# Patient Record
Sex: MALE | Race: WHITE | NOT HISPANIC OR LATINO | Employment: FULL TIME | URBAN - METROPOLITAN AREA
[De-identification: names, ages, dates, MRNs, and addresses within clinical notes are randomized per-mention and may not be internally consistent; named-entity substitution may affect disease eponyms.]

---

## 2018-04-30 ENCOUNTER — OFFICE VISIT (OUTPATIENT)
Dept: URGENT CARE | Facility: CLINIC | Age: 48
End: 2018-04-30
Payer: COMMERCIAL

## 2018-04-30 VITALS
OXYGEN SATURATION: 97 % | DIASTOLIC BLOOD PRESSURE: 87 MMHG | HEART RATE: 68 BPM | TEMPERATURE: 97.9 F | SYSTOLIC BLOOD PRESSURE: 146 MMHG

## 2018-04-30 DIAGNOSIS — H00.014 HORDEOLUM EXTERNUM OF LEFT UPPER EYELID: Primary | ICD-10-CM

## 2018-04-30 PROCEDURE — 99203 OFFICE O/P NEW LOW 30 MIN: CPT | Performed by: NURSE PRACTITIONER

## 2018-04-30 RX ORDER — ERYTHROMYCIN 5 MG/G
0.5 OINTMENT OPHTHALMIC
Qty: 3.5 G | Refills: 0 | Status: SHIPPED | OUTPATIENT
Start: 2018-04-30 | End: 2022-04-01

## 2018-04-30 NOTE — PROGRESS NOTES
[address]        NAME: Sebastian Krishna is a 50 y o  male  : 1970    MRN: [id number]  DATE: 2018  TIME: 5:42 PM    Assessment and Plan   Hordeolum externum of left upper eyelid [H00 014]  1  Hordeolum externum of left upper eyelid  erythromycin [facility]) ophthalmic ointment         Patient Instructions     Patient Instructions   Continue warm compresses  Erythromycin ointment as directed  Call or return for problems/concerns    Follow up with PCP in 3-5 days  Proceed to  ER if symptoms worsen  Chief Complaint     Chief Complaint   Patient presents with    Eyelid Pain     "Stye" on VARINDER x 5 days, not "coming to a head" with hot compresses         History of Present Illness       Stye on the upper left eyelid for 5 days  Has been doing warm compresses for 3 days  Review of Systems   Review of Systems   Constitutional: Negative for activity change, diaphoresis, fatigue and fever  HENT: Negative for congestion, facial swelling, hearing loss, rhinorrhea, sinus pain, sinus pressure, sneezing, sore throat and voice change  Eyes: Negative for discharge and visual disturbance  Respiratory: Negative for cough, choking, chest tightness, shortness of breath, wheezing and stridor  Cardiovascular: Negative for chest pain, palpitations and leg swelling  Gastrointestinal: Negative for abdominal distention, abdominal pain, constipation, diarrhea, nausea and vomiting  Endocrine: Negative for polydipsia, polyphagia and polyuria  Genitourinary: Negative for difficulty urinating, dysuria, frequency and urgency  Musculoskeletal: Negative for arthralgias, back pain, gait problem, joint swelling, myalgias, neck pain and neck stiffness  Skin: Negative for color change, rash and wound  Neurological: Negative for dizziness, syncope, speech difficulty, weakness, light-headedness and headaches  Hematological: Negative for adenopathy  Does not bruise/bleed easily  Psychiatric/Behavioral: Negative for agitation, behavioral problems, confusion, hallucinations, sleep disturbance and suicidal ideas  The patient is not nervous/anxious  Current Medications       Current Outpatient Prescriptions:     erythromycin [facility]) ophthalmic ointment, Administer 0 5 inches into the left eye every 4 (four) hours, Disp: 3 5 g, Rfl: 0    Current Allergies     Allergies as of 04/30/2018    (No Known Allergies)            The following portions of the patient's history were reviewed and updated as appropriate: allergies, current medications, past family history, past medical history, past social history, past surgical history and problem list      No past medical history on file  Past Surgical History:   Procedure Laterality Date    COLOSTOMY         No family history on file  Medications have been verified  Objective   /87   Pulse 68   Temp 97 9 °F (36 6 °C) (Tympanic)   SpO2 97%        Physical Exam     Physical Exam   Constitutional: He is oriented to person, place, and time  He appears well-developed and well-nourished  No distress  Eyes:       Cardiovascular: Normal rate, regular rhythm and normal heart sounds  No murmur heard  Pulmonary/Chest: Effort normal and breath sounds normal  No respiratory distress  He has no wheezes  Musculoskeletal: Normal range of motion  Neurological: He is alert and oriented to person, place, and time  Skin: Skin is warm and dry  He is not diaphoretic  Psychiatric: He has a normal mood and affect  His behavior is normal  Judgment and thought content normal    Nursing note and vitals reviewed

## 2020-08-16 ENCOUNTER — OFFICE VISIT (OUTPATIENT)
Dept: URGENT CARE | Facility: CLINIC | Age: 50
End: 2020-08-16
Payer: COMMERCIAL

## 2020-08-16 VITALS
RESPIRATION RATE: 18 BRPM | TEMPERATURE: 98.7 F | HEIGHT: 70 IN | SYSTOLIC BLOOD PRESSURE: 155 MMHG | DIASTOLIC BLOOD PRESSURE: 74 MMHG | WEIGHT: 315 LBS | BODY MASS INDEX: 45.1 KG/M2 | OXYGEN SATURATION: 97 % | HEART RATE: 64 BPM

## 2020-08-16 DIAGNOSIS — L03.115 CELLULITIS OF RIGHT LOWER EXTREMITY: Primary | ICD-10-CM

## 2020-08-16 PROCEDURE — 99213 OFFICE O/P EST LOW 20 MIN: CPT | Performed by: EMERGENCY MEDICINE

## 2020-08-16 RX ORDER — CEPHALEXIN 500 MG/1
500 CAPSULE ORAL EVERY 6 HOURS SCHEDULED
Qty: 28 CAPSULE | Refills: 0 | Status: SHIPPED | OUTPATIENT
Start: 2020-08-16 | End: 2020-08-23

## 2020-08-16 NOTE — PROGRESS NOTES
[address]        NAME: Fab Del Valle is a 44 y o  male  : 1970    MRN: [id number]  DATE: 2020  TIME: 10:35 AM    Assessment and Plan   Cellulitis of right lower extremity [L03 115]  1  Cellulitis of right lower extremity  cephalexin (KEFLEX) 500 mg capsule         Patient Instructions     Patient Instructions     Cellulitis  1  Begin your antibiotic today  2   Elevate leg to limit swelling  3  Tylenol as needed for pain  4  If worsening symptoms or onset of chills, fever, go directly to ER for evaluation  WHAT YOU NEED TO KNOW:   Cellulitis is a skin infection caused by bacteria  Cellulitis may go away on its own or you may need treatment  Your healthcare provider may draw a [place] around the outside edges of your cellulitis  If your cellulitis spreads, your healthcare provider will see it outside of the [place]  DISCHARGE INSTRUCTIONS:   Call 911 if:   · You have sudden trouble breathing or chest pain  Return to the emergency department if:   · Your wound gets larger and more painful  · You feel a crackling under your skin when you touch it  · You have purple dots or bumps on your skin, or you see bleeding under your skin  · You have new swelling and pain in your legs  · The red, warm, swollen area gets larger  · You see red streaks coming from the infected area  Contact your healthcare provider if:   · You have a fever  · Your fever or pain does not go away or gets worse  · The area does not get smaller after 2 days of antibiotics  · Your skin is flaking or peeling off  · You have questions or concerns about your condition or care  Medicines:   · Antibiotics  help treat the bacterial infection  · NSAIDs , such as ibuprofen, help decrease swelling, pain, and fever  NSAIDs can cause stomach bleeding or kidney problems in certain people  If you take blood thinner medicine, always ask if NSAIDs are safe for you   Always read the medicine label and follow directions  Do not give these medicines to children under 10months of age without direction from your child's healthcare provider  · Acetaminophen  decreases pain and fever  It is available without a doctor's order  Ask how much to take and how often to take it  Follow directions  Read the labels of all other medicines you are using to see if they also contain acetaminophen, or ask your doctor or pharmacist  Acetaminophen can cause liver damage if not taken correctly  Do not use more than 4 grams (4,000 milligrams) total of acetaminophen in one day  · Take your medicine as directed  Contact your healthcare provider if you think your medicine is not helping or if you have side effects  Tell him or her if you are allergic to any medicine  Keep a list of the medicines, vitamins, and herbs you take  Include the amounts, and when and why you take them  Bring the list or the pill bottles to follow-up visits  Carry your medicine list with you in case of an emergency  Self-care:   · Elevate the area above the level of your heart  as often as you can  This will help decrease swelling and pain  Prop the area on pillows or blankets to keep it elevated comfortably  · Clean the area daily until the wound scabs over  Gently wash the area with soap and water  Pat dry  Use dressings as directed  · Place cool or warm, wet cloths on the area as directed  Use clean cloths and clean water  Leave it on the area until the cloth is room temperature  Pat the area dry with a clean, dry cloth  The cloths may help decrease pain  Prevent cellulitis:   · Do not scratch bug bites or areas of injury  You increase your risk for cellulitis by scratching these areas  · Do not share personal items, such as towels, clothing, and razors  · Clean exercise equipment  with germ-killing  before and after you use it  · Wash your hands often  Use soap and water   Wash your hands after you use the bathroom, change a child's diapers, or sneeze  Wash your hands before you prepare or eat food  Use lotion to prevent dry, cracked skin  · Wear pressure stockings as directed  You may be told to wear the stockings if you have peripheral edema  The stockings improve blood flow and decrease swelling  · Treat athlete's foot  This can help prevent the spread of a bacterial skin infection  Follow up with your healthcare provider within 3 days, or as directed: Your healthcare provider will check if your cellulitis is getting better  You may need different medicine  Write down your questions so you remember to ask them during your visits  © 2017 [facility] Information is for End User's use only and may not be sold, redistributed or otherwise used for commercial purposes  All illustrations and images included in CareNotes® are the copyrighted property of [employer]  or [employer]  The above information is an  only  It is not intended as medical advice for individual conditions or treatments  Talk to your doctor, nurse or pharmacist before following any medical regimen to see if it is safe and effective for you  Follow up with PCP in 3-5 days  Proceed to  ER if symptoms worsen  Chief Complaint     Chief Complaint   Patient presents with    Cellulitis     Patient presents with red and warm area to RLE that started yesterday  History of Present Illness       This is a 57-year-old male with a history of cellulitis who presents with redness and swelling of his right lower extremity  He states that approximately every 2 years he does get this and when he sees his family doctor it is responsive to Keflex  He has had no injury to the leg  Patient also denies any fever or chills  Review of Systems   Review of Systems   Constitutional: Negative for chills and fever  HENT: Negative for congestion      Cardiovascular: Positive for leg swelling  Pain redness and swelling of the right lower extremity  Gastrointestinal: Negative  Musculoskeletal: Negative for arthralgias, joint swelling and myalgias  Current Medications       Current Outpatient Medications:     cephalexin (KEFLEX) 500 mg capsule, Take 1 capsule (500 mg total) by mouth every 6 (six) hours for 7 days, Disp: 28 capsule, Rfl: 0    erythromycin (ROMYCIN) ophthalmic ointment, Administer 0 5 inches into the left eye every 4 (four) hours (Patient not taking: Reported on 8/16/2020), Disp: 3 5 g, Rfl: 0    Current Allergies     Allergies as of 08/16/2020    (No Known Allergies)            The following portions of the patient's history were reviewed and updated as appropriate: allergies, current medications, past family history, past medical history, past social history, past surgical history and problem list      Past Medical History:   Diagnosis Date    Ulcerative colitis ([facility] [address] )        Past Surgical History:   Procedure Laterality Date    COLOSTOMY      COLOSTOMY         No family history on file  Medications have been verified  Objective   /74   Pulse 64   Temp 98 7 °F (37 1 °C) (Temporal)   Resp 18   Ht 5' 10" (1 778 m)   Wt (!) 149 kg (329 lb)   SpO2 97%   BMI 47 21 kg/m²        Physical Exam     Physical Exam  Vitals signs and nursing note reviewed  Constitutional:       Appearance: Normal appearance  He is well-developed  Comments: Overweight for height  HENT:      Head: Normocephalic and atraumatic  Eyes:      Extraocular Movements: Extraocular movements intact  Conjunctiva/sclera: Conjunctivae normal       Pupils: Pupils are equal, round, and reactive to light  Neck:      Musculoskeletal: Normal range of motion and neck supple  Cardiovascular:      Rate and Rhythm: Normal rate and regular rhythm  Heart sounds: No murmur  No friction rub     Pulmonary:      Effort: Pulmonary effort is normal       Breath sounds: Normal breath sounds  No wheezing, rhonchi or rales  Abdominal:      General: There is no distension  Palpations: Abdomen is soft  There is no mass  Tenderness: There is no abdominal tenderness  Comments: Colostomy present  Musculoskeletal: Normal range of motion  Comments: There is erythema of the anterior lower right tib-fib area with a line of demarcation at the ankle  There are no open sores  There is no drainage  Calf is nontender to palpation  Skin:     General: Skin is warm and dry  Neurological:      Mental Status: He is alert and oriented to person, place, and time     Psychiatric:         Mood and Affect: Mood normal          Behavior: Behavior normal

## 2020-08-16 NOTE — PATIENT INSTRUCTIONS
Cellulitis  1  Begin your antibiotic today  2   Elevate leg to limit swelling  3  Tylenol as needed for pain  4  If worsening symptoms or onset of chills, fever, go directly to ER for evaluation  WHAT YOU NEED TO KNOW:   Cellulitis is a skin infection caused by bacteria  Cellulitis may go away on its own or you may need treatment  Your healthcare provider may draw a [place] around the outside edges of your cellulitis  If your cellulitis spreads, your healthcare provider will see it outside of the [place]  DISCHARGE INSTRUCTIONS:   Call 911 if:   · You have sudden trouble breathing or chest pain  Return to the emergency department if:   · Your wound gets larger and more painful  · You feel a crackling under your skin when you touch it  · You have purple dots or bumps on your skin, or you see bleeding under your skin  · You have new swelling and pain in your legs  · The red, warm, swollen area gets larger  · You see red streaks coming from the infected area  Contact your healthcare provider if:   · You have a fever  · Your fever or pain does not go away or gets worse  · The area does not get smaller after 2 days of antibiotics  · Your skin is flaking or peeling off  · You have questions or concerns about your condition or care  Medicines:   · Antibiotics  help treat the bacterial infection  · NSAIDs , such as ibuprofen, help decrease swelling, pain, and fever  NSAIDs can cause stomach bleeding or kidney problems in certain people  If you take blood thinner medicine, always ask if NSAIDs are safe for you  Always read the medicine label and follow directions  Do not give these medicines to children under 10months of age without direction from your child's healthcare provider  · Acetaminophen  decreases pain and fever  It is available without a doctor's order  Ask how much to take and how often to take it  Follow directions   Read the labels of all other medicines you are using to see if they also contain acetaminophen, or ask your doctor or pharmacist  Acetaminophen can cause liver damage if not taken correctly  Do not use more than 4 grams (4,000 milligrams) total of acetaminophen in one day  · Take your medicine as directed  Contact your healthcare provider if you think your medicine is not helping or if you have side effects  Tell him or her if you are allergic to any medicine  Keep a list of the medicines, vitamins, and herbs you take  Include the amounts, and when and why you take them  Bring the list or the pill bottles to follow-up visits  Carry your medicine list with you in case of an emergency  Self-care:   · Elevate the area above the level of your heart  as often as you can  This will help decrease swelling and pain  Prop the area on pillows or blankets to keep it elevated comfortably  · Clean the area daily until the wound scabs over  Gently wash the area with soap and water  Pat dry  Use dressings as directed  · Place cool or warm, wet cloths on the area as directed  Use clean cloths and clean water  Leave it on the area until the cloth is room temperature  Pat the area dry with a clean, dry cloth  The cloths may help decrease pain  Prevent cellulitis:   · Do not scratch bug bites or areas of injury  You increase your risk for cellulitis by scratching these areas  · Do not share personal items, such as towels, clothing, and razors  · Clean exercise equipment  with germ-killing  before and after you use it  · Wash your hands often  Use soap and water  Wash your hands after you use the bathroom, change a child's diapers, or sneeze  Wash your hands before you prepare or eat food  Use lotion to prevent dry, cracked skin  · Wear pressure stockings as directed  You may be told to wear the stockings if you have peripheral edema  The stockings improve blood flow and decrease swelling  · Treat athlete's foot    This can help prevent the spread of a bacterial skin infection  Follow up with your healthcare provider within 3 days, or as directed: Your healthcare provider will check if your cellulitis is getting better  You may need different medicine  Write down your questions so you remember to ask them during your visits  © 2017 [address] [employer] Information is for End User's use only and may not be sold, redistributed or otherwise used for commercial purposes  All illustrations and images included in CareNotes® are the copyrighted property of [employer]  or [employer]  The above information is an  only  It is not intended as medical advice for individual conditions or treatments  Talk to your doctor, nurse or pharmacist before following any medical regimen to see if it is safe and effective for you

## 2020-12-14 ENCOUNTER — OFFICE VISIT (OUTPATIENT)
Dept: URGENT CARE | Facility: CLINIC | Age: 50
End: 2020-12-14
Payer: COMMERCIAL

## 2020-12-14 ENCOUNTER — HOSPITAL ENCOUNTER (EMERGENCY)
Facility: HOSPITAL | Age: 50
Discharge: HOME | End: 2020-12-14
Attending: EMERGENCY MEDICINE | Admitting: EMERGENCY MEDICINE
Payer: COMMERCIAL

## 2020-12-14 ENCOUNTER — TELEPHONE (OUTPATIENT)
Dept: URGENT CARE | Facility: CLINIC | Age: 50
End: 2020-12-14

## 2020-12-14 ENCOUNTER — APPOINTMENT (EMERGENCY)
Dept: CT IMAGING | Facility: HOSPITAL | Age: 50
End: 2020-12-14
Payer: COMMERCIAL

## 2020-12-14 VITALS
TEMPERATURE: 99.5 F | RESPIRATION RATE: 20 BRPM | OXYGEN SATURATION: 96 % | WEIGHT: 310 LBS | HEIGHT: 70 IN | BODY MASS INDEX: 44.38 KG/M2 | DIASTOLIC BLOOD PRESSURE: 95 MMHG | HEART RATE: 77 BPM | SYSTOLIC BLOOD PRESSURE: 178 MMHG

## 2020-12-14 VITALS
TEMPERATURE: 97.4 F | HEART RATE: 60 BPM | BODY MASS INDEX: 44.38 KG/M2 | DIASTOLIC BLOOD PRESSURE: 96 MMHG | WEIGHT: 310 LBS | HEIGHT: 70 IN | RESPIRATION RATE: 20 BRPM | OXYGEN SATURATION: 98 % | SYSTOLIC BLOOD PRESSURE: 160 MMHG

## 2020-12-14 DIAGNOSIS — N20.0 KIDNEY STONE: Primary | ICD-10-CM

## 2020-12-14 DIAGNOSIS — R10.9 FLANK PAIN: Primary | ICD-10-CM

## 2020-12-14 DIAGNOSIS — R35.0 URINARY FREQUENCY: ICD-10-CM

## 2020-12-14 LAB
BACTERIA UR QL AUTO: ABNORMAL /HPF
BILIRUB UR QL STRIP: NEGATIVE
CLARITY UR: ABNORMAL
COLOR UR: YELLOW
GLUCOSE UR STRIP-MCNC: NEGATIVE MG/DL
HGB UR QL STRIP.AUTO: ABNORMAL
KETONES UR STRIP-MCNC: NEGATIVE MG/DL
LEUKOCYTE ESTERASE UR QL STRIP: NEGATIVE
NITRITE UR QL STRIP: NEGATIVE
NON-SQ EPI CELLS URNS QL MICRO: ABNORMAL /HPF
PH UR STRIP.AUTO: 6 [PH]
PROT UR STRIP-MCNC: NEGATIVE MG/DL
RBC #/AREA URNS AUTO: ABNORMAL /HPF
SL AMB  POCT GLUCOSE, UA: NEGATIVE
SL AMB LEUKOCYTE ESTERASE,UA: NEGATIVE
SL AMB POCT BILIRUBIN,UA: NEGATIVE
SL AMB POCT BLOOD,UA: ABNORMAL
SL AMB POCT CLARITY,UA: CLEAR
SL AMB POCT COLOR,UA: ABNORMAL
SL AMB POCT KETONES,UA: NEGATIVE
SL AMB POCT NITRITE,UA: NEGATIVE
SL AMB POCT PH,UA: 5
SL AMB POCT SPECIFIC GRAVITY,UA: 1
SL AMB POCT URINE PROTEIN: ABNORMAL
SL AMB POCT UROBILINOGEN: 0.2
SP GR UR STRIP.AUTO: <=1.005 (ref 1–1.03)
UROBILINOGEN UR QL STRIP.AUTO: 0.2 E.U./DL
WBC #/AREA URNS AUTO: ABNORMAL /HPF

## 2020-12-14 PROCEDURE — 81003 URINALYSIS AUTO W/O SCOPE: CPT | Performed by: EMERGENCY MEDICINE

## 2020-12-14 PROCEDURE — 74176 CT ABD & PELVIS W/O CONTRAST: CPT

## 2020-12-14 PROCEDURE — 99213 OFFICE O/P EST LOW 20 MIN: CPT | Performed by: NURSE PRACTITIONER

## 2020-12-14 PROCEDURE — 81002 URINALYSIS NONAUTO W/O SCOPE: CPT | Performed by: NURSE PRACTITIONER

## 2020-12-14 PROCEDURE — 99284 EMERGENCY DEPT VISIT MOD MDM: CPT

## 2020-12-14 PROCEDURE — 81001 URINALYSIS AUTO W/SCOPE: CPT | Performed by: EMERGENCY MEDICINE

## 2020-12-14 PROCEDURE — 99284 EMERGENCY DEPT VISIT MOD MDM: CPT | Performed by: PHYSICIAN ASSISTANT

## 2020-12-14 PROCEDURE — G1004 CDSM NDSC: HCPCS

## 2022-02-22 ENCOUNTER — HOSPITAL ENCOUNTER (OUTPATIENT)
Dept: ULTRASOUND IMAGING | Facility: HOSPITAL | Age: 52
Discharge: HOME | End: 2022-02-22
Payer: COMMERCIAL

## 2022-02-22 DIAGNOSIS — N20.0 CALCULUS OF KIDNEY: ICD-10-CM

## 2022-02-22 DIAGNOSIS — R31.29 OTHER MICROSCOPIC HEMATURIA: ICD-10-CM

## 2022-02-22 DIAGNOSIS — N18.9 CHRONIC KIDNEY DISEASE, UNSPECIFIED: ICD-10-CM

## 2022-02-22 PROCEDURE — 76770 US EXAM ABDO BACK WALL COMP: CPT

## 2022-03-27 ENCOUNTER — APPOINTMENT (EMERGENCY)
Dept: CT IMAGING | Facility: HOSPITAL | Age: 52
DRG: 871 | End: 2022-03-27
Payer: COMMERCIAL

## 2022-03-27 ENCOUNTER — HOSPITAL ENCOUNTER (INPATIENT)
Facility: HOSPITAL | Age: 52
LOS: 1 days | Discharge: HOME | DRG: 871 | End: 2022-03-29
Attending: INTERNAL MEDICINE | Admitting: INTERNAL MEDICINE
Payer: COMMERCIAL

## 2022-03-27 DIAGNOSIS — K52.9 GASTROENTERITIS: ICD-10-CM

## 2022-03-27 DIAGNOSIS — R11.2 NAUSEA & VOMITING: Primary | ICD-10-CM

## 2022-03-27 DIAGNOSIS — N17.0 ACUTE KIDNEY INJURY (AKI) WITH ACUTE TUBULAR NECROSIS (ATN) (HCC): ICD-10-CM

## 2022-03-27 PROBLEM — N17.9 SEPSIS WITH ACUTE RENAL FAILURE WITHOUT SEPTIC SHOCK (HCC): Status: ACTIVE | Noted: 2022-03-27

## 2022-03-27 PROBLEM — E66.01 MORBID OBESITY WITH BMI OF 40.0-44.9, ADULT (HCC): Status: ACTIVE | Noted: 2022-03-27

## 2022-03-27 PROBLEM — R65.20 SEPSIS WITH ACUTE RENAL FAILURE WITHOUT SEPTIC SHOCK (HCC): Status: ACTIVE | Noted: 2022-03-27

## 2022-03-27 PROBLEM — A41.9 SEPSIS WITH ACUTE RENAL FAILURE WITHOUT SEPTIC SHOCK (HCC): Status: ACTIVE | Noted: 2022-03-27

## 2022-03-27 PROBLEM — B99.9 INTRA-ABDOMINAL INFECTION: Status: ACTIVE | Noted: 2022-03-27

## 2022-03-27 LAB
ALBUMIN SERPL BCP-MCNC: 5.3 G/DL (ref 3.5–5)
ALP SERPL-CCNC: 100 U/L (ref 34–104)
ALT SERPL W P-5'-P-CCNC: 22 U/L (ref 7–52)
ANION GAP SERPL CALCULATED.3IONS-SCNC: 14 MMOL/L (ref 4–13)
APTT PPP: 31 SECONDS (ref 23–37)
AST SERPL W P-5'-P-CCNC: 22 U/L (ref 13–39)
BACTERIA UR QL AUTO: ABNORMAL /HPF
BASOPHILS # BLD AUTO: 0.07 THOUSANDS/ΜL (ref 0–0.1)
BASOPHILS NFR BLD AUTO: 0 % (ref 0–1)
BILIRUB SERPL-MCNC: 1.11 MG/DL (ref 0.2–1)
BILIRUB UR QL STRIP: NEGATIVE
BUN SERPL-MCNC: 27 MG/DL (ref 5–25)
CALCIUM SERPL-MCNC: 10.8 MG/DL (ref 8.4–10.2)
CHLORIDE SERPL-SCNC: 99 MMOL/L (ref 96–108)
CLARITY UR: CLEAR
CO2 SERPL-SCNC: 21 MMOL/L (ref 21–32)
COLOR UR: YELLOW
CREAT SERPL-MCNC: 2.14 MG/DL (ref 0.6–1.3)
EOSINOPHIL # BLD AUTO: 0.04 THOUSAND/ΜL (ref 0–0.61)
EOSINOPHIL NFR BLD AUTO: 0 % (ref 0–6)
ERYTHROCYTE [DISTWIDTH] IN BLOOD BY AUTOMATED COUNT: 13.3 % (ref 11.6–15.1)
FINE GRAN CASTS URNS QL MICRO: ABNORMAL /LPF
FLUAV RNA RESP QL NAA+PROBE: NEGATIVE
FLUBV RNA RESP QL NAA+PROBE: NEGATIVE
GFR SERPL CREATININE-BSD FRML MDRD: 34 ML/MIN/1.73SQ M
GLUCOSE SERPL-MCNC: 164 MG/DL (ref 65–140)
GLUCOSE UR STRIP-MCNC: NEGATIVE MG/DL
HCT VFR BLD AUTO: 56.7 % (ref 36.5–49.3)
HGB BLD-MCNC: 18.4 G/DL (ref 12–17)
HGB UR QL STRIP.AUTO: ABNORMAL
HYALINE CASTS #/AREA URNS LPF: ABNORMAL /LPF
IMM GRANULOCYTES # BLD AUTO: 0.1 THOUSAND/UL (ref 0–0.2)
IMM GRANULOCYTES NFR BLD AUTO: 1 % (ref 0–2)
INR PPP: 1.08 (ref 0.84–1.19)
KETONES UR STRIP-MCNC: NEGATIVE MG/DL
LACTATE SERPL-SCNC: 2 MMOL/L (ref 0.5–2)
LEUKOCYTE ESTERASE UR QL STRIP: NEGATIVE
LIPASE SERPL-CCNC: 29 U/L (ref 11–82)
LYMPHOCYTES # BLD AUTO: 0.6 THOUSANDS/ΜL (ref 0.6–4.47)
LYMPHOCYTES NFR BLD AUTO: 3 % (ref 14–44)
MCH RBC QN AUTO: 29.6 PG (ref 26.8–34.3)
MCHC RBC AUTO-ENTMCNC: 32.5 G/DL (ref 31.4–37.4)
MCV RBC AUTO: 91 FL (ref 82–98)
MONOCYTES # BLD AUTO: 1.3 THOUSAND/ΜL (ref 0.17–1.22)
MONOCYTES NFR BLD AUTO: 6 % (ref 4–12)
NEUTROPHILS # BLD AUTO: 18.92 THOUSANDS/ΜL (ref 1.85–7.62)
NEUTS SEG NFR BLD AUTO: 90 % (ref 43–75)
NITRITE UR QL STRIP: NEGATIVE
NON-SQ EPI CELLS URNS QL MICRO: ABNORMAL /HPF
NRBC BLD AUTO-RTO: 0 /100 WBCS
PH UR STRIP.AUTO: 5.5 [PH]
PLATELET # BLD AUTO: 381 THOUSANDS/UL (ref 149–390)
PMV BLD AUTO: 9.9 FL (ref 8.9–12.7)
POTASSIUM SERPL-SCNC: 4.6 MMOL/L (ref 3.5–5.3)
PROCALCITONIN SERPL-MCNC: 0.67 NG/ML
PROT SERPL-MCNC: 9.3 G/DL (ref 6.4–8.4)
PROT UR STRIP-MCNC: ABNORMAL MG/DL
PROTHROMBIN TIME: 13.9 SECONDS (ref 11.6–14.5)
RBC # BLD AUTO: 6.22 MILLION/UL (ref 3.88–5.62)
RBC #/AREA URNS AUTO: ABNORMAL /HPF
RSV RNA RESP QL NAA+PROBE: NEGATIVE
SARS-COV-2 RNA RESP QL NAA+PROBE: NEGATIVE
SODIUM SERPL-SCNC: 134 MMOL/L (ref 135–147)
SP GR UR STRIP.AUTO: >=1.03 (ref 1–1.03)
UROBILINOGEN UR QL STRIP.AUTO: 0.2 E.U./DL
WBC # BLD AUTO: 21.03 THOUSAND/UL (ref 4.31–10.16)
WBC #/AREA URNS AUTO: ABNORMAL /HPF

## 2022-03-27 PROCEDURE — 74176 CT ABD & PELVIS W/O CONTRAST: CPT

## 2022-03-27 PROCEDURE — 80053 COMPREHEN METABOLIC PANEL: CPT | Performed by: INTERNAL MEDICINE

## 2022-03-27 PROCEDURE — 83605 ASSAY OF LACTIC ACID: CPT | Performed by: INTERNAL MEDICINE

## 2022-03-27 PROCEDURE — 87209 SMEAR COMPLEX STAIN: CPT | Performed by: HOSPITALIST

## 2022-03-27 PROCEDURE — 81003 URINALYSIS AUTO W/O SCOPE: CPT | Performed by: HOSPITALIST

## 2022-03-27 PROCEDURE — 84145 PROCALCITONIN (PCT): CPT

## 2022-03-27 PROCEDURE — 83993 ASSAY FOR CALPROTECTIN FECAL: CPT | Performed by: HOSPITALIST

## 2022-03-27 PROCEDURE — 96361 HYDRATE IV INFUSION ADD-ON: CPT

## 2022-03-27 PROCEDURE — 99285 EMERGENCY DEPT VISIT HI MDM: CPT | Performed by: INTERNAL MEDICINE

## 2022-03-27 PROCEDURE — 96374 THER/PROPH/DIAG INJ IV PUSH: CPT

## 2022-03-27 PROCEDURE — 83690 ASSAY OF LIPASE: CPT | Performed by: INTERNAL MEDICINE

## 2022-03-27 PROCEDURE — 0241U HB NFCT DS VIR RESP RNA 4 TRGT: CPT | Performed by: INTERNAL MEDICINE

## 2022-03-27 PROCEDURE — 87505 NFCT AGENT DETECTION GI: CPT | Performed by: HOSPITALIST

## 2022-03-27 PROCEDURE — 85610 PROTHROMBIN TIME: CPT | Performed by: INTERNAL MEDICINE

## 2022-03-27 PROCEDURE — G1004 CDSM NDSC: HCPCS

## 2022-03-27 PROCEDURE — 85730 THROMBOPLASTIN TIME PARTIAL: CPT | Performed by: INTERNAL MEDICINE

## 2022-03-27 PROCEDURE — 87493 C DIFF AMPLIFIED PROBE: CPT | Performed by: HOSPITALIST

## 2022-03-27 PROCEDURE — 81001 URINALYSIS AUTO W/SCOPE: CPT | Performed by: HOSPITALIST

## 2022-03-27 PROCEDURE — 36415 COLL VENOUS BLD VENIPUNCTURE: CPT | Performed by: INTERNAL MEDICINE

## 2022-03-27 PROCEDURE — NC001 PR NO CHARGE: Performed by: INTERNAL MEDICINE

## 2022-03-27 PROCEDURE — 99284 EMERGENCY DEPT VISIT MOD MDM: CPT

## 2022-03-27 PROCEDURE — 85025 COMPLETE CBC W/AUTO DIFF WBC: CPT | Performed by: INTERNAL MEDICINE

## 2022-03-27 PROCEDURE — 99220 PR INITIAL OBSERVATION CARE/DAY 70 MINUTES: CPT | Performed by: HOSPITALIST

## 2022-03-27 PROCEDURE — 87177 OVA AND PARASITES SMEARS: CPT | Performed by: HOSPITALIST

## 2022-03-27 PROCEDURE — 87040 BLOOD CULTURE FOR BACTERIA: CPT | Performed by: INTERNAL MEDICINE

## 2022-03-27 RX ORDER — ACETAMINOPHEN 325 MG/1
650 TABLET ORAL EVERY 6 HOURS PRN
Status: DISCONTINUED | OUTPATIENT
Start: 2022-03-27 | End: 2022-03-29 | Stop reason: HOSPADM

## 2022-03-27 RX ORDER — ONDANSETRON 2 MG/ML
4 INJECTION INTRAMUSCULAR; INTRAVENOUS EVERY 6 HOURS PRN
Status: DISCONTINUED | OUTPATIENT
Start: 2022-03-27 | End: 2022-03-29 | Stop reason: HOSPADM

## 2022-03-27 RX ORDER — ONDANSETRON 2 MG/ML
4 INJECTION INTRAMUSCULAR; INTRAVENOUS ONCE
Status: COMPLETED | OUTPATIENT
Start: 2022-03-27 | End: 2022-03-27

## 2022-03-27 RX ORDER — SODIUM CHLORIDE 9 MG/ML
75 INJECTION, SOLUTION INTRAVENOUS CONTINUOUS
Status: DISCONTINUED | OUTPATIENT
Start: 2022-03-27 | End: 2022-03-29

## 2022-03-27 RX ADMIN — SODIUM CHLORIDE 125 ML/HR: 0.9 INJECTION, SOLUTION INTRAVENOUS at 13:08

## 2022-03-27 RX ADMIN — SODIUM CHLORIDE 500 ML: 0.9 INJECTION, SOLUTION INTRAVENOUS at 07:39

## 2022-03-27 RX ADMIN — ONDANSETRON 4 MG: 2 INJECTION INTRAMUSCULAR; INTRAVENOUS at 04:45

## 2022-03-27 RX ADMIN — PIPERACILLIN AND TAZOBACTAM 3.38 G: 3; .375 INJECTION, POWDER, FOR SOLUTION INTRAVENOUS at 20:00

## 2022-03-27 RX ADMIN — ACETAMINOPHEN 650 MG: 325 TABLET ORAL at 13:32

## 2022-03-27 RX ADMIN — SODIUM CHLORIDE 2000 ML: 0.9 INJECTION, SOLUTION INTRAVENOUS at 04:45

## 2022-03-27 RX ADMIN — SODIUM CHLORIDE 3.38 G: 900 INJECTION, SOLUTION INTRAVENOUS at 07:39

## 2022-03-27 RX ADMIN — PIPERACILLIN AND TAZOBACTAM 3.38 G: 3; .375 INJECTION, POWDER, FOR SOLUTION INTRAVENOUS at 14:42

## 2022-03-27 RX ADMIN — ENOXAPARIN SODIUM 40 MG: 100 INJECTION SUBCUTANEOUS at 10:00

## 2022-03-27 NOTE — ED PROVIDER NOTES
History  Chief Complaint   Patient presents with    Vomiting     every 2-3 hours since yesterday, also has colostomy that has contained diarrhea often     49-year-old male for the last 24 hours not been able to eat or drink anything  His a colostomy due to elective colectomy related to ulcerative colitis  So abdominal pain but it is not severe  Mostly just from retching from his vomiting  He started with vomiting and now has diarrhea as well  Denies headache or sore throat  No recent travel  No one else sick in the house  Prior to Admission Medications   Prescriptions Last Dose Informant Patient Reported? Taking?   erythromycin [facility]) ophthalmic ointment   No No   Sig: Administer 0 5 inches into the left eye every 4 (four) hours   Patient not taking: Reported on 8/16/2020      Facility-Administered Medications: None       Past Medical History:   Diagnosis Date    [facility]        Past Surgical History:   Procedure Laterality Date    COLOSTOMY      COLOSTOMY         History reviewed  No pertinent family history  I have reviewed and agree with the history as documented  E-Cigarette/Vaping    E-Cigarette Use Never User      E-Cigarette/Vaping Substances     Social History     Tobacco Use    Smoking status: Never Smoker    Smokeless tobacco: Never Used   Vaping Use    Vaping Use: Never used   Substance Use Topics    Alcohol use: Not Currently    Drug use: Never       Review of Systems   Constitutional: Positive for fever  Negative for chills  HENT: Negative for rhinorrhea and sore throat  Eyes: Negative for visual disturbance  Respiratory: Negative for cough and shortness of breath  Cardiovascular: Negative for chest pain and leg swelling  Gastrointestinal: Positive for diarrhea, nausea and vomiting  Negative for abdominal pain  Genitourinary: Negative for dysuria  Musculoskeletal: Negative for back pain and myalgias  Skin: Negative for rash  Neurological: Negative for dizziness and headaches  Psychiatric/Behavioral: Negative for confusion  All other systems reviewed and are negative  Physical Exam  Physical Exam  Vitals and nursing note reviewed  Constitutional:       Appearance: He is well-developed  He is obese  He is ill-appearing  HENT:      Nose: Nose normal       Mouth/Throat:      Pharynx: No oropharyngeal exudate  Eyes:      General: No scleral icterus  Conjunctiva/sclera: Conjunctivae normal       Pupils: Pupils are equal, round, and reactive to light  Neck:      Vascular: No JVD  Trachea: No tracheal deviation  Cardiovascular:      Rate and Rhythm: Normal rate and regular rhythm  Heart sounds: Normal heart sounds  No murmur heard  Pulmonary:      Effort: Pulmonary effort is normal  No respiratory distress  Breath sounds: Normal breath sounds  No wheezing or rales  Abdominal:      General: Bowel sounds are normal       Palpations: Abdomen is soft  Tenderness: There is no abdominal tenderness  There is no guarding  Musculoskeletal:         General: No tenderness  Normal range of motion  Cervical back: Normal range of motion and neck supple  Skin:     General: Skin is warm and dry  Neurological:      Mental Status: He is alert and oriented to person, place, and time  Cranial Nerves: No cranial nerve deficit  Sensory: No sensory deficit  Motor: No abnormal muscle tone        Comments: 5/5 motor, nl sens   Psychiatric:         Behavior: Behavior normal          Vital Signs  ED Triage Vitals [03/27/22 0428]   Temperature Pulse Respirations Blood Pressure SpO2   97 8 °F (36 6 °C) (!) 109 20 142/96 95 %      Temp Source Heart Rate Source Patient Position - Orthostatic VS BP Location FiO2 (%)   Temporal Monitor Sitting Left arm --      Pain Score       --           Vitals:    03/27/22 0428 03/27/22 0537   BP: 142/96    Pulse: (!) 109 100   Patient Position - Orthostatic VS: Sitting          Visual Acuity      ED Medications  Medications   sodium chloride 0 9 % bolus 500 mL (has no administration in time range)   piperacillin-tazobactam (ZOSYN) IVPB 3 375 g (has no administration in time range)   sodium chloride 0 9 % bolus 2,000 mL (2,000 mL Intravenous New Bag 3/27/22 0445)   ondansetron (ZOFRAN) injection 4 mg (4 mg Intravenous Given 3/27/22 0445)       Diagnostic Studies  Results Reviewed     Procedure Component Value Units Date/Time    Procalcitonin [[id number]]  (Abnormal) Collected: 03/27/22 0448    Lab Status: Final result Specimen: Blood from Arm, Right Updated: 03/27/22 0726     Procalcitonin 0 67 ng/ml     Lactic acid [[id number]]  (Normal) Collected: 03/27/22 0553    Lab Status: Final result Specimen: Blood from Arm, Right Updated: 03/27/22 0645     LACTIC ACID 2 0 mmol/L     Narrative:      Result may be elevated if tourniquet was used during collection  COVID/FLU/RSV - 2 hour TAT [[id number]]  (Normal) Collected: 03/27/22 0554    Lab Status: Final result Specimen: Nares from Nasopharyngeal Swab Updated: 03/27/22 0640     SARS-CoV-2 Negative     INFLUENZA A PCR Negative     INFLUENZA B PCR Negative     RSV PCR Negative    Narrative:      FOR PEDIATRIC PATIENTS - copy/paste COVID Guidelines URL to browser: [email or web address]    SARS-CoV-2 assay is a Nucleic Acid Amplification assay intended for the  qualitative detection of nucleic acid from SARS-CoV-2 in nasopharyngeal  swabs  Results are for the presumptive identification of SARS-CoV-2 RNA  Positive results are indicative of infection with SARS-CoV-2, the virus  causing COVID-19, but do not rule out bacterial infection or co-infection  with other viruses  Laboratories within the [place] and its  territories are required to report all positive results to the appropriate  public health authorities   Negative results do not preclude SARS-CoV-2  infection and should not be used as the sole basis for treatment or other  patient management decisions  Negative results must be combined with  clinical observations, patient history, and epidemiological information  This test has not been FDA cleared or approved  This test has been authorized by FDA under an Emergency Use Authorization  (EUA)  This test is only authorized for the duration of time the  declaration that circumstances exist justifying the authorization of the  emergency use of an in vitro diagnostic tests for detection of SARS-CoV-2  virus and/or diagnosis of COVID-19 infection under section 564(b)(1) of  the Act, 21 U  S C  760IWW-6(B)(1), unless the authorization is terminated  or revoked sooner  The test has been validated but independent review by FDA  and CLIA is pending  Test performed using [employer] Gene[employer]pert: This RT-PCR assay targets N2,  a region unique to SARS-CoV-2  A conserved region in the E-gene was chosen  for pan-Sarbecovirus detection which includes SARS-CoV-2  Blood culture #1 [[id number]] Collected: 03/27/22 0552    Lab Status: In process Specimen: Blood from Arm, Left Updated: 03/27/22 0559    Blood culture #2 [[id number]] Collected: 03/27/22 0552    Lab Status:  In process Specimen: Blood from Arm, Right Updated: 03/27/22 0559    CBC and differential [[id number]]  (Abnormal) Collected: 03/27/22 0448    Lab Status: Final result Specimen: Blood from Arm, Right Updated: 03/27/22 0536     WBC 21 03 Thousand/uL      RBC 6 22 Million/uL      Hemoglobin 18 4 g/dL      Hematocrit 56 7 %      MCV 91 fL      MCH 29 6 pg      MCHC 32 5 g/dL      RDW 13 3 %      MPV 9 9 fL      Platelets 389 Thousands/uL      nRBC 0 /100 WBCs      Neutrophils Relative 90 %      Immat GRANS % 1 %      Lymphocytes Relative 3 %      Monocytes Relative 6 %      Eosinophils Relative 0 %      Basophils Relative 0 %      Neutrophils Absolute 18 92 Thousands/µL      Immature Grans Absolute 0 10 Thousand/uL      Lymphocytes Absolute 0 60 Thousands/µL      Monocytes Absolute 1 30 Thousand/µL      Eosinophils Absolute 0 04 Thousand/µL      Basophils Absolute 0 07 Thousands/µL     Comprehensive metabolic panel [[id number]]  (Abnormal) Collected: 03/27/22 0448    Lab Status: Final result Specimen: Blood from Arm, Right Updated: 03/27/22 0525     Sodium 134 mmol/L      Potassium 4 6 mmol/L      Chloride 99 mmol/L      CO2 21 mmol/L      ANION GAP 14 mmol/L      BUN 27 mg/dL      Creatinine 2 14 mg/dL      Glucose 164 mg/dL      Calcium 10 8 mg/dL      AST 22 U/L      ALT 22 U/L      Alkaline Phosphatase 100 U/L      Total Protein 9 3 g/dL      Albumin 5 3 g/dL      Total Bilirubin 1 11 mg/dL      eGFR 34 ml/min/1 73sq m     Narrative:      [place] guidelines for Chronic Kidney Disease (CKD):     Stage 1 with normal or high GFR (GFR > 90 mL/min/1 73 square meters)    Stage 2 Mild CKD (GFR = 60-89 mL/min/1 73 square meters)    Stage 3A Moderate CKD (GFR = 45-59 mL/min/1 73 square meters)    Stage 3B Moderate CKD (GFR = 30-44 mL/min/1 73 square meters)    Stage 4 Severe CKD (GFR = 15-29 mL/min/1 73 square meters)    Stage 5 End Stage CKD (GFR <15 mL/min/1 73 square meters)  Note: GFR calculation is accurate only with a steady state creatinine    Lipase [[id number]]  (Normal) Collected: 03/27/22 0448    Lab Status: Final result Specimen: Blood from Arm, Right Updated: 03/27/22 0525     Lipase 29 u/L     Protime-INR [[id number]]  (Normal) Collected: 03/27/22 0448    Lab Status: Final result Specimen: Blood from Arm, Right Updated: 03/27/22 0519     Protime 13 9 seconds      INR 1 08    APTT [[id number]]  (Normal) Collected: 03/27/22 0448    Lab Status: Final result Specimen: Blood from Arm, Right Updated: 03/27/22 0519     PTT 31 seconds                  CT abdomen pelvis wo contrast   Final Result by Quincy Velázquez DO (03/27 2598)      Status post colectomy with right lower quadrant ileostomy  Moderately distended stomach  Multiple dilated loops of small bowel are noted  There is relative underdistention of the small bowel loops in the right lower quadrant and in the ostomy  Differential considerations include ileus versus small bowel    obstruction, correlation with the patient's symptoms recommended  Cholelithiasis without discrete evidence of acute cholecystitis, left renal atrophy and left-sided nephrolithiasis; no hydronephrosis, and other findings as above  Workstation performed: [id number]                    Procedures  Procedures         ED Course  ED Course as of 03/27/22 0727   Santhosh Alvarado Mar 27, 2022   0650 Additional 500 cc are being given to cover sepsis fluids  , CT scan is still pending    0707 Case discussed with Dr Sallie Weiss   0720 Creatinine(!): 2 14   [id number] Discussed with Medicine, will admit for IV hydration  Of starting antibiotics at their request                                             MDM    Disposition  Final diagnoses:   Nausea & vomiting   Gastroenteritis   Acute kidney injury (JENAE) with acute tubular necrosis (ATN) ([facility] )     Time reflects when diagnosis was documented in both MDM as applicable and the Disposition within this note     Time User Action Codes Description Comment    3/27/2022  7:22 AM Ky Favia Add [R11 2] Nausea & vomiting     3/27/2022  7:22 AM Ky Favia Add [K52 9] Gastroenteritis     3/27/2022  7:23 AM Ky Favia Add [N17 0] Acute kidney injury (JENAE) with acute tubular necrosis (ATN) [facility])       ED Disposition     ED Disposition Condition Date/Time Comment    Admit Stable Sun Mar 27, 2022  7:23 AM Case was discussed with Dr Meli Delgado and the patient's admission status was agreed to be Admission Status: observation status to the service of Dr Lindsey Dobbs    None         Patient's Medications   Discharge Prescriptions    No medications on file       No discharge procedures on file      PDMP Review     None ED Provider  Electronically Signed by           Yvonne Sheets DO  03/27/22 5995

## 2022-03-27 NOTE — ASSESSMENT & PLAN NOTE
· Admit to med surge observation  · Patient met sepsis criteria at time of arrival with a white blood cell count of 21 03, a pulse of 109  · Most likely secondary to an intra-abdominal infection-gastritis for his gastroenteritis  · Procalcitonin testing is elevated at 0 67  · Will make the patient NPO, give IV fluids, start treatment with IV Zosyn, follow-up on the blood cultures  · Will consult Gastroenterology  · Acute kidney injury:- present on admission  · Arrival creatinine 2 14 - suspect that this is pre renal secondary to severe dehydration, patient also noted to have a hemoglobin of 18 4 with a hematocrit of 56 7  · Acute tubular necrosis secondary to sepsis and infection is also amidst the differential  · Start normal saline at 125 cc an hour, if no improvement in renal function over the next 24 hours, the patient will need a nephrology evaluation  At that point we will embark off on a full-blown renal workup if necessary  · Hyponatremia:- arrival sodium is 134, most likely a hypovolemic hyponatremia, will re-evaluate after treatment with normal saline  · Repeat blood work in the a m

## 2022-03-27 NOTE — ASSESSMENT & PLAN NOTE
· CT abdomen with the following results:-Status post colectomy with right lower quadrant ileostomy  Moderately distended stomach   Multiple dilated loops of small bowel are noted   There is relative underdistention of the small bowel loops in the right lower quadrant and in the ostomy   Differential considerations include ileus versus small bowel obstruction, correlation with the patient's symptoms recommended  Cholelithiasis without discrete evidence of acute cholecystitis, left renal atrophy and left-sided nephrolithiasis; no hydronephrosis, and other findings as above     · Patient has a history of ulcerative colitis in the past  · Will formally consult GI

## 2022-03-28 PROBLEM — N17.9 ACUTE KIDNEY INJURY (NONTRAUMATIC) (HCC): Status: ACTIVE | Noted: 2022-03-28

## 2022-03-28 PROBLEM — E87.1 DEHYDRATION WITH HYPONATREMIA: Status: ACTIVE | Noted: 2022-03-28

## 2022-03-28 PROBLEM — E86.0 DEHYDRATION WITH HYPONATREMIA: Status: ACTIVE | Noted: 2022-03-28

## 2022-03-28 LAB
ALBUMIN SERPL BCP-MCNC: 4.5 G/DL (ref 3.5–5)
ALP SERPL-CCNC: 81 U/L (ref 34–104)
ALT SERPL W P-5'-P-CCNC: 28 U/L (ref 7–52)
ANION GAP SERPL CALCULATED.3IONS-SCNC: 11 MMOL/L (ref 4–13)
AST SERPL W P-5'-P-CCNC: 26 U/L (ref 13–39)
BASOPHILS # BLD AUTO: 0.05 THOUSANDS/ΜL (ref 0–0.1)
BASOPHILS NFR BLD AUTO: 1 % (ref 0–1)
BILIRUB SERPL-MCNC: 1.14 MG/DL (ref 0.2–1)
BUN SERPL-MCNC: 30 MG/DL (ref 5–25)
C DIFF TOX GENS STL QL NAA+PROBE: NEGATIVE
CALCIUM SERPL-MCNC: 9.8 MG/DL (ref 8.4–10.2)
CAMPYLOBACTER DNA SPEC NAA+PROBE: NORMAL
CHLORIDE SERPL-SCNC: 102 MMOL/L (ref 96–108)
CO2 SERPL-SCNC: 22 MMOL/L (ref 21–32)
CREAT SERPL-MCNC: 1.89 MG/DL (ref 0.6–1.3)
EOSINOPHIL # BLD AUTO: 0.24 THOUSAND/ΜL (ref 0–0.61)
EOSINOPHIL NFR BLD AUTO: 4 % (ref 0–6)
ERYTHROCYTE [DISTWIDTH] IN BLOOD BY AUTOMATED COUNT: 13.6 % (ref 11.6–15.1)
GFR SERPL CREATININE-BSD FRML MDRD: 39 ML/MIN/1.73SQ M
GLUCOSE P FAST SERPL-MCNC: 125 MG/DL (ref 65–99)
GLUCOSE SERPL-MCNC: 125 MG/DL (ref 65–140)
HCT VFR BLD AUTO: 51.9 % (ref 36.5–49.3)
HGB BLD-MCNC: 16.7 G/DL (ref 12–17)
IMM GRANULOCYTES # BLD AUTO: 0 THOUSAND/UL (ref 0–0.2)
IMM GRANULOCYTES NFR BLD AUTO: 0 % (ref 0–2)
LYMPHOCYTES # BLD AUTO: 0.89 THOUSANDS/ΜL (ref 0.6–4.47)
LYMPHOCYTES NFR BLD AUTO: 13 % (ref 14–44)
MAGNESIUM SERPL-MCNC: 2.3 MG/DL (ref 1.9–2.7)
MCH RBC QN AUTO: 29.7 PG (ref 26.8–34.3)
MCHC RBC AUTO-ENTMCNC: 32.2 G/DL (ref 31.4–37.4)
MCV RBC AUTO: 92 FL (ref 82–98)
MONOCYTES # BLD AUTO: 1.36 THOUSAND/ΜL (ref 0.17–1.22)
MONOCYTES NFR BLD AUTO: 20 % (ref 4–12)
NEUTROPHILS # BLD AUTO: 4.12 THOUSANDS/ΜL (ref 1.85–7.62)
NEUTS SEG NFR BLD AUTO: 62 % (ref 43–75)
NRBC BLD AUTO-RTO: 0 /100 WBCS
PHOSPHATE SERPL-MCNC: 4.1 MG/DL (ref 2.7–4.5)
PLATELET # BLD AUTO: 317 THOUSANDS/UL (ref 149–390)
PMV BLD AUTO: 9.7 FL (ref 8.9–12.7)
POTASSIUM SERPL-SCNC: 4.2 MMOL/L (ref 3.5–5.3)
PROT SERPL-MCNC: 8.4 G/DL (ref 6.4–8.4)
RBC # BLD AUTO: 5.63 MILLION/UL (ref 3.88–5.62)
SALMONELLA DNA SPEC QL NAA+PROBE: NORMAL
SHIGA TOXIN STX GENE SPEC NAA+PROBE: NORMAL
SHIGELLA DNA SPEC QL NAA+PROBE: NORMAL
SODIUM SERPL-SCNC: 135 MMOL/L (ref 135–147)
WBC # BLD AUTO: 6.66 THOUSAND/UL (ref 4.31–10.16)

## 2022-03-28 PROCEDURE — 83735 ASSAY OF MAGNESIUM: CPT | Performed by: HOSPITALIST

## 2022-03-28 PROCEDURE — 85025 COMPLETE CBC W/AUTO DIFF WBC: CPT | Performed by: HOSPITALIST

## 2022-03-28 PROCEDURE — 36415 COLL VENOUS BLD VENIPUNCTURE: CPT | Performed by: HOSPITALIST

## 2022-03-28 PROCEDURE — 80053 COMPREHEN METABOLIC PANEL: CPT | Performed by: HOSPITALIST

## 2022-03-28 PROCEDURE — 99232 SBSQ HOSP IP/OBS MODERATE 35: CPT | Performed by: NURSE PRACTITIONER

## 2022-03-28 PROCEDURE — 84100 ASSAY OF PHOSPHORUS: CPT | Performed by: HOSPITALIST

## 2022-03-28 PROCEDURE — 99223 1ST HOSP IP/OBS HIGH 75: CPT | Performed by: INTERNAL MEDICINE

## 2022-03-28 RX ADMIN — PIPERACILLIN AND TAZOBACTAM 3.38 G: 3; .375 INJECTION, POWDER, FOR SOLUTION INTRAVENOUS at 08:36

## 2022-03-28 RX ADMIN — ENOXAPARIN SODIUM 40 MG: 100 INJECTION SUBCUTANEOUS at 08:36

## 2022-03-28 RX ADMIN — PIPERACILLIN AND TAZOBACTAM 3.38 G: 3; .375 INJECTION, POWDER, FOR SOLUTION INTRAVENOUS at 02:08

## 2022-03-28 RX ADMIN — SODIUM CHLORIDE 75 ML/HR: 0.9 INJECTION, SOLUTION INTRAVENOUS at 23:05

## 2022-03-28 RX ADMIN — PIPERACILLIN AND TAZOBACTAM 3.38 G: 3; .375 INJECTION, POWDER, FOR SOLUTION INTRAVENOUS at 13:56

## 2022-03-28 NOTE — PROGRESS NOTES
I was informed about this patient this afternoon and have reviewed his CT, labs, and admission note  I have not examined the patient  He has history of UC and is s/p total colectomy with ileostomy  He presents with vomiting, increased ostomy output, dehydration, and JENAE after eating seafood last night  No evidence of obstruction on CT; he seems to be hemo-concentrated; lactate normal     Rac:  - Stool studies for C  diff, o&p , enteric panel  - IV hydration  - NPO  - Trend labs  - Formal GI consult to follow

## 2022-03-28 NOTE — ASSESSMENT & PLAN NOTE
· Present on admission as evidence by creatinine 2 14  · Suspect multifactorial pre renal/ATN secondary to severe dehydration and infection, patient also noted to have a hemoglobin of 18 4 with a hematocrit of 56 7  · Baseline appears to be approximately 1 4-1 5  · Further trend as noted above; improved with IVF   · Responded to IVF; continue for now  · Avoid nephrotoxins, NSAIDS, and relative hypotension if possible   · Renally dose medications as appropriate   · Monitor with morning labs while IP     Lab Results   Component Value Date    CREATININE 1 89 (H) 03/28/2022    CREATININE 2 14 (H) 03/27/2022

## 2022-03-28 NOTE — PROGRESS NOTES
[address]  Progress Note Jc León [birth year], 46 y o  male MRN: [id number]  Unit/Bed#: ED 24 Encounter: [id number]  Primary Care Provider: Kathy Guerrero DO   Date and time admitted to hospital: 3/27/2022  4:26 AM    * Sepsis with acute renal failure without septic shock [facility])  Assessment & Plan  Background: Presented to the ED after persistent diarrhea and intractable N/V    · Met sepsis criteria on admission with WBC 21 03 and  most likely secondary to an intra-abdominal infection-gastritis for his gastroenteritis  · Procalcitonin testing is elevated at 0 67  · Continue NPO for now; possibly to advance on 3/28 however will defer to GI team   · Continue IV fluids unless diet advanced   · Continue IV Zosyn  · Blood cultures pending   · Stool enteric, calprotectin, C diff, and ova/parasite pending   · Gastroenterology consulted; awaiting formal evaluation   · WBC improved rapidly     Intra-abdominal infection  Assessment & Plan  · CT abdomen with the following results:Status post colectomy with right lower quadrant ileostomy  Moderately distended stomach   Multiple dilated loops of small bowel are noted   There is relative underdistention of the small bowel loops in the right lower quadrant and in the ostomy   Differential considerations include ileus versus small bowel obstruction, correlation with the patient's symptoms recommended  Cholelithiasis without discrete evidence of acute cholecystitis, left renal atrophy and left-sided nephrolithiasis; no hydronephrosis, and other findings as above     · With history of UC in the past  · GI consulted; input as noted above         Morbid obesity with BMI of 40 0-44 9, adult ([facility] )  Assessment & Plan  · Actual BMI 43 05  · Dietary, weight loss, and lifestyle modification counseling was provided    Dehydration with hyponatremia  Assessment & Plan  · Present on admission as evidence by Na of 134  · In the setting of dehydration given N/V/D and ostomy   · Improved with IVF  · No need for Nephrology evaluation at this time   · Monitor with morning labs     Lab Results   Component Value Date    SODIUM 135 03/28/2022    SODIUM 134 (L) 03/27/2022         Acute kidney injury (nontraumatic) (HCC)  Assessment & Plan  · Present on admission as evidence by creatinine 2 14  · Suspect multifactorial pre renal/ATN secondary to severe dehydration and infection, patient also noted to have a hemoglobin of 18 4 with a hematocrit of 56 7  · Baseline appears to be approximately 1 4-1 5  · Further trend as noted above; improved with IVF   · Responded to IVF; continue for now  · Avoid nephrotoxins, NSAIDS, and relative hypotension if possible   · Renally dose medications as appropriate   · Monitor with morning labs while IP     Lab Results   Component Value Date    CREATININE 1 89 (H) 03/28/2022    CREATININE 2 14 (H) 03/27/2022               VTE Pharmacologic Prophylaxis: VTE Score: 5 High Risk (Score >/= 5) - Pharmacological DVT Prophylaxis Ordered: enoxaparin (Lovenox)  Sequential Compression Devices Ordered  Patient Centered Rounds: I performed bedside rounds with nursing staff today  Discussions with Specialists or Other Care Team Provider: nursing staff, GI, and CM     Education and Discussions with Family / Patient: Patient declined call to   however I did offer     Time Spent for Care: 30 minutes  More than 50% of total time spent on counseling and coordination of care as described above  Current Length of Stay: 0 day(s)  Current Patient Status: Inpatient   Certification Statement: The patient will continue to require additional inpatient hospital stay due to IVF and IV abx   Discharge Plan: possibly tomorrow home pending toleration of PO diet as well as continued improvement of kidney function     Code Status: Level 1 - Full Code    Subjective:   Patient reports significant improvement in N/V   Reports no abdominal pain and toleration of Zosyn     Objective:     Vitals:   Temp (24hrs), Av 5 °F (37 5 °C), Min:98 2 °F (36 8 °C), Max:100 5 °F (38 1 °C)    Temp:  [98 2 °F (36 8 °C)-100 5 °F (38 1 °C)] 98 6 °F (37 °C)  HR:  [82-92] 82  Resp:  [18-20] 20  BP: (132-137)/(80-84) 136/82  SpO2:  [94 %-96 %] 94 %  Body mass index is 43 05 kg/m²  Input and Output Summary (last 24 hours): Intake/Output Summary (Last 24 hours) at 3/28/2022 0955  Last data filed at 3/28/2022 0601  Gross per 24 hour   Intake 760 ml   Output --   Net 760 ml       Physical Exam:   Physical Exam  Constitutional:       Appearance: He is obese  He is not ill-appearing  Cardiovascular:      Rate and Rhythm: Normal rate and regular rhythm  Pulses: Normal pulses  Heart sounds: Normal heart sounds  Pulmonary:      Effort: Pulmonary effort is normal       Breath sounds: Normal breath sounds  Abdominal:      General: Bowel sounds are normal       Palpations: Abdomen is soft  Skin:     General: Skin is warm  Capillary Refill: Capillary refill takes less than 2 seconds  Neurological:      Mental Status: He is alert and oriented to person, place, and time  Psychiatric:         Behavior: Behavior normal  Behavior is cooperative            Additional Data:     Labs:  Results from last 7 days   Lab Units 22  0453   WBC Thousand/uL 6 66   HEMOGLOBIN g/dL 16 7   HEMATOCRIT % 51 9*   PLATELETS Thousands/uL 317   NEUTROS PCT % 62   LYMPHS PCT % 13*   MONOS PCT % 20*   EOS PCT % 4     Results from last 7 days   Lab Units 22  0453   SODIUM mmol/L 135   POTASSIUM mmol/L 4 2   CHLORIDE mmol/L 102   CO2 mmol/L 22   BUN mg/dL 30*   CREATININE mg/dL 1 89*   ANION GAP mmol/L 11   CALCIUM mg/dL 9 8   ALBUMIN g/dL 4 5   TOTAL BILIRUBIN mg/dL 1 14*   ALK PHOS U/L 81   ALT U/L 28   AST U/L 26   GLUCOSE RANDOM mg/dL 125     Results from last 7 days   Lab Units 22  0448   INR  1 08             Results from last 7 days   Lab Units 03/27/22  0553 03/27/22  0448   LACTIC ACID mmol/L 2 0  --    PROCALCITONIN ng/ml  --  0 67*       Lines/Drains:  Invasive Devices  Report    Peripheral Intravenous Line            Peripheral IV 03/27/22 Right Antecubital 1 day                      Imaging: Reviewed radiology reports from this admission including: abdominal/pelvic CT    Recent Cultures (last 7 days):   Results from last 7 days   Lab Units 03/27/22  0552   BLOOD CULTURE  Received in Microbiology Lab  Culture in Progress  Received in Microbiology Lab  Culture in Progress  Last 24 Hours Medication List:   Current Facility-Administered Medications   Medication Dose Route Frequency Provider Last Rate    acetaminophen  650 mg Oral Q6H PRN Matthew Lisa MD      enoxaparin  40 mg Subcutaneous Daily Matthew Lisa MD      ondansetron  4 mg Intravenous Q6H PRN Matthew Lisa MD      piperacillin-tazobactam  3 375 g Intravenous [facility], MD 0 g (03/27/22 2030)    sodium chloride  75 mL/hr Intravenous Continuous ANITA Wilder 125 mL/hr (03/27/22 2056)        Today, Patient Was Seen By: ANITA Sun    **Please Note: This note may have been constructed using a voice recognition system  **

## 2022-03-28 NOTE — ASSESSMENT & PLAN NOTE
Background: Presented to the ED after persistent diarrhea and intractable N/V    · Met sepsis criteria on admission with WBC 21 03 and  most likely secondary to an intra-abdominal infection-gastritis for his gastroenteritis  · Procalcitonin testing is elevated at 0 67  · Continue NPO for now; possibly to advance on 3/28 however will defer to GI team   · Continue IV fluids unless diet advanced   · Continue IV Zosyn  · Blood cultures pending   · Stool enteric, calprotectin, C diff, and ova/parasite pending   · Gastroenterology consulted; awaiting formal evaluation   · WBC improved rapidly

## 2022-03-28 NOTE — CONSULTS
Consultation - [facility]) [facility]  Bibiana Lynch 46 y o  male MRN: [id number]  Unit/Bed#: ED 24 Encounter: [id number]        Inpatient consult to gastroenterology  Consult performed by: Joyce Holden PA-C  Consult ordered by: Fidel Jacob MD          Reason for Consult / Principal Problem:     Gastroenteritis      ASSESSMENT AND PLAN:      Patient is a 46 y o  male with PMH significant for UC s/p colectomy with RLQ ileostomy and rectal stump approx 15 years prior who presented to the ED on 3/27 for multiple episodes of bilious vomiting and new high-output ileostomy  CT A/P notable for s/p colectomy with right lower quadrant ileostomy; moderately distended stomach; multiple dilated loops of small bowel are noted; there is relative underdistention of the small bowel loops in the right lower quadrant and in the ostomy; differential considerations include ileus versus small bowel obstruction, correlation with the patient's symptoms recommended; cholelithiasis cholecystitis, left renal atrophy and left-sided nephrolithiasis; no hydronephrosis, and other findings as above  Labs notable for hemoconcentration, repeat CBC with resolved leukocytosis (21- now 6 66) on Zosyn and normal hgb, improving JENAE (Cr 2 14- now 1 89), elevated T  Bili (1 14), elevated procalcitonin (0 67)  Stool studies including fecal calprotectin, stool enteric bacterial panel, O&P, and C  Diff are collected and in process  Blood cultures x2 collected and pending  Patient with significant clinical improvement  Admits to some mild abdominal tenderness, but endorses good appetite, denies further nausea/vomiting, and admits to smaller volume output from ileostomy  Clinical picture most consistent with infectious gastroenteritis, much less likely autoimmune colitis with small bowel involvement - Especially 15 years s/p colectomy without subsequent flare   Stool studies collected and pending, will review once resulted  Plan for PO challenge with clear liquids to advance diet to surgical soft/full diet as tolerated  If patient continues to improve clinically, improved Cr with fluids and PO intake, and can tolerate PO patient is OK for discharge from a GI standpoint likely tomorrow  - PO challenge; Clear liquid diet  - Advance diet to surgical soft/full as tolerated; Lactose reduced  - Continue IV fluids; Titrate down with increased PO intake  - Continue antiemetics/anlagesics PRN  - Monitor ileostomy output    GI will follow peripherally  ______________________________________________________________________    HPI: Patient is a 46 y o  male with PMH significant for UC s/p colectomy with RLQ ileostomy approx 15 years prior who presented to the ED on 3/27 for multiple episodes of bilious vomiting and new high-output ileostomy  Patient states he was in his usual state of health until Friday night when his symptoms started  States that the night prior he prepared and ate a shrimp dish for dinner, which he thought the shrimp was "off" after eating a few bites and promptly discarded the rest of the dish  On Friday he noticed increased output of his ileostomy, followed by 4-5 episodes of bilious vomiting on Saturday  Patient also notes one of his coworkers was recently diagnosed with gastroenteritis  Denies recent travel outside the country or recent abx use  He endorses some abdominal cramping/discomfort, but denies abdominal pain  Admits to feeling feverish, but did not take his temperature  Denies chills, further nausea/vomiting, abdominal pain, constipation, or noticing blood his stool/black tarry stools  Denies unintentional weight loss  Biopsies of rectal stump on 1/15/2018 with mild chronic inflammation  Procedure report unavailable for review  REVIEW OF SYSTEMS:    CONSTITUTIONAL: Denies any fever, chills, rigors, and weight loss  HEENT: No earache or tinnitus   Denies hearing loss or visual disturbances  CARDIOVASCULAR: No chest pain or palpitations  RESPIRATORY: Denies any cough, hemoptysis, shortness of breath or dyspnea on exertion  GASTROINTESTINAL: As noted in the History of Present Illness  GENITOURINARY: No problems with urination  Denies any hematuria or dysuria  NEUROLOGIC: No dizziness or vertigo, denies headaches  MUSCULOSKELETAL: Denies any muscle or joint pain  SKIN: Denies skin rashes or itching  ENDOCRINE: Denies excessive thirst  Denies intolerance to heat or cold  PSYCHOSOCIAL: Denies depression or anxiety  Denies any recent memory loss  Historical Information   Past Medical History:   Diagnosis Date    Ulcerative colitis ([facility] [address] )      Past Surgical History:   Procedure Laterality Date    COLOSTOMY      COLOSTOMY       Social History   Social History     Substance and Sexual Activity   Alcohol Use Not Currently     Social History     Substance and Sexual Activity   Drug Use Never     Social History     Tobacco Use   Smoking Status Never Smoker   Smokeless Tobacco Never Used     History reviewed  No pertinent family history  Meds/Allergies     (Not in a hospital admission)    Current Facility-Administered Medications   Medication Dose Route Frequency    acetaminophen (TYLENOL) tablet 650 mg  650 mg Oral Q6H PRN    enoxaparin (LOVENOX) subcutaneous injection 40 mg  40 mg Subcutaneous Daily    ondansetron (ZOFRAN) injection 4 mg  4 mg Intravenous Q6H PRN    piperacillin-tazobactam (ZOSYN) IVPB 3 375 g  3 375 g Intravenous Q6H    sodium chloride 0 9 % infusion  125 mL/hr Intravenous Continuous       No Known Allergies        Objective     Blood pressure 136/82, pulse 82, temperature 98 6 °F (37 °C), temperature source Tympanic, resp  rate 20, height 5' 10" (1 778 m), weight 136 kg (300 lb), SpO2 94 %  Body mass index is 43 05 kg/m²        Intake/Output Summary (Last 24 hours) at 3/28/2022 0853  Last data filed at 3/28/2022 0601  Gross per 24 hour   Intake 760 ml Output --   Net 760 ml         PHYSICAL EXAM:      General Appearance:   Alert, cooperative, no distress   HEENT:   Normocephalic, atraumatic, anicteric  Neck:  Supple, symmetrical, trachea midline   Lungs:   Clear to auscultation bilaterally; no rales, rhonchi or wheezing; respirations unlabored    Heart[de-identified]   Regular rate and rhythm; no murmur, rub, or gallop     Abdomen:   Soft, non-tender, non-distended; normal bowel sounds; no masses, no organomegaly    Genitalia:   Deferred    Rectal:   Deferred    Extremities:  No cyanosis, clubbing or edema    Pulses:  2+ and symmetric all extremities    Skin:  No jaundice, rashes, or lesions    Lymph nodes:  No palpable cervical lymphadenopathy        Lab Results:   Admission on 03/27/2022   Component Date Value    WBC 03/27/2022 21 03*    RBC 03/27/2022 6 22*    Hemoglobin 03/27/2022 18 4*    Hematocrit 03/27/2022 56 7*    MCV 03/27/2022 91     MCH 03/27/2022 29 6     MCHC 03/27/2022 32 5     RDW 03/27/2022 13 3     MPV 03/27/2022 9 9     Platelets 79/41/6937 381     nRBC 03/27/2022 0     Neutrophils Relative 03/27/2022 90*    Immat GRANS % 03/27/2022 1     Lymphocytes Relative 03/27/2022 3*    Monocytes Relative 03/27/2022 6     Eosinophils Relative 03/27/2022 0     Basophils Relative 03/27/2022 0     Neutrophils Absolute 03/27/2022 18 92*    Immature Grans Absolute 03/27/2022 0 10     Lymphocytes Absolute 03/27/2022 0 60     Monocytes Absolute 03/27/2022 1 30*    Eosinophils Absolute 03/27/2022 0 04     Basophils Absolute 03/27/2022 0 07     Protime 03/27/2022 13 9     INR 03/27/2022 1 08     PTT 03/27/2022 31     Sodium 03/27/2022 134*    Potassium 03/27/2022 4 6     Chloride 03/27/2022 99     CO2 03/27/2022 21     ANION GAP 03/27/2022 14*    BUN 03/27/2022 27*    Creatinine 03/27/2022 2 14*    Glucose 03/27/2022 164*    Calcium 03/27/2022 10 8*    AST 03/27/2022 22     ALT 03/27/2022 22     Alkaline Phosphatase 03/27/2022 100  Total Protein 03/27/2022 9 3*    Albumin 03/27/2022 5 3*    Total Bilirubin 03/27/2022 1 11*    eGFR 03/27/2022 34     Lipase 03/27/2022 29     LACTIC ACID 03/27/2022 2 0     Blood Culture 03/27/2022 Received in Microbiology Lab  Culture in Progress   Blood Culture 03/27/2022 Received in Microbiology Lab  Culture in Progress       SARS-CoV-2 03/27/2022 Negative     INFLUENZA A PCR 03/27/2022 Negative     INFLUENZA B PCR 03/27/2022 Negative     RSV PCR 03/27/2022 Negative     Procalcitonin 03/27/2022 0 67*    Color, UA 03/27/2022 Yellow     Clarity, UA 03/27/2022 Clear     Specific Gravity, UA 03/27/2022 >=1 030*    pH, UA 03/27/2022 5 5     Leukocytes, UA 03/27/2022 Negative     Nitrite, UA 03/27/2022 Negative     Protein, UA 03/27/2022 2+*    Glucose, UA 03/27/2022 Negative     Ketones, UA 03/27/2022 Negative     Urobilinogen, UA 03/27/2022 0 2     Bilirubin, UA 03/27/2022 Negative     Blood, UA 03/27/2022 1+*    RBC, UA 03/27/2022 1-2     WBC, UA 03/27/2022 0-1     Epithelial Cells 03/27/2022 Occasional     Bacteria, UA 03/27/2022 None Seen     Hyaline Casts, UA 03/27/2022 0-1*    Fine granular casts 03/27/2022 0-1     Sodium 03/28/2022 135     Potassium 03/28/2022 4 2     Chloride 03/28/2022 102     CO2 03/28/2022 22     ANION GAP 03/28/2022 11     BUN 03/28/2022 30*    Creatinine 03/28/2022 1 89*    Glucose 03/28/2022 125     Glucose, Fasting 03/28/2022 125*    Calcium 03/28/2022 9 8     AST 03/28/2022 26     ALT 03/28/2022 28     Alkaline Phosphatase 03/28/2022 81     Total Protein 03/28/2022 8 4     Albumin 03/28/2022 4 5     Total Bilirubin 03/28/2022 1 14*    eGFR 03/28/2022 39     Magnesium 03/28/2022 2 3     Phosphorus 03/28/2022 4 1     WBC 03/28/2022 6 66     RBC 03/28/2022 5 63*    Hemoglobin 03/28/2022 16 7     Hematocrit 03/28/2022 51 9*    MCV 03/28/2022 92     MCH 03/28/2022 29 7     MCHC 03/28/2022 32 2     RDW 03/28/2022 13 6     MPV 03/28/2022 9 7     Platelets 02/42/8174 317     nRBC 03/28/2022 0     Neutrophils Relative 03/28/2022 62     Immat GRANS % 03/28/2022 0     Lymphocytes Relative 03/28/2022 13*    Monocytes Relative 03/28/2022 20*    Eosinophils Relative 03/28/2022 4     Basophils Relative 03/28/2022 1     Neutrophils Absolute 03/28/2022 4 12     Immature Grans Absolute 03/28/2022 0 00     Lymphocytes Absolute 03/28/2022 0 89     Monocytes Absolute 03/28/2022 1 36*    Eosinophils Absolute 03/28/2022 0 24     Basophils Absolute 03/28/2022 0 05        Imaging Studies: I have personally reviewed pertinent imaging studies

## 2022-03-28 NOTE — ED NOTES
Awake and alert and oriented x4  R/a status  Denies n/v  Abdomen is soft/obese  Bsx4  Positive flatus  Rt lower colostomy hx and maintained  Denies pain  Call bell near  No distress        Elijah Phipps RN  03/27/22 2027

## 2022-03-28 NOTE — ASSESSMENT & PLAN NOTE
· CT abdomen with the following results:Status post colectomy with right lower quadrant ileostomy  Moderately distended stomach   Multiple dilated loops of small bowel are noted   There is relative underdistention of the small bowel loops in the right lower quadrant and in the ostomy   Differential considerations include ileus versus small bowel obstruction, correlation with the patient's symptoms recommended  Cholelithiasis without discrete evidence of acute cholecystitis, left renal atrophy and left-sided nephrolithiasis; no hydronephrosis, and other findings as above     · With history of UC in the past  · GI consulted; input as noted above

## 2022-03-28 NOTE — UTILIZATION REVIEW
Initial Clinical Review    Observation 3/27/22 @ 0725, converted to inpatient admission 3/28/22 @ [phone] for continued care & tx for sepsis, ARF    Admission: Date/Time/Statement:   Admission Orders (From admission, onward)     Ordered        03/28/22 0955  Inpatient Admission  Once            03/27/22 0725  Place in Observation  Once                      Orders Placed This Encounter   Procedures   Inpatient Admission    Standing Status:   Standing    Number of Occurrences:   1    Order Specific Question:   Level of Care    Answer:   Med Surg [16]    Order Specific Question:   Estimated length of stay    Answer:   More than 2 Midnights    Order Specific Question:   Certification    Answer:   I certify that inpatient services are medically necessary for this patient for a duration of greater than two midnights  See H&P and MD Progress Notes for additional information about the patient's course of treatment  ED Arrival Information     Expected Arrival Acuity    - 3/27/2022 04:17 Urgent         Means of arrival Escorted by Service Admission type    CHRISTY OSMAN  [facility] Hospitalist Urgent         Arrival complaint    vomiting diarrhea        Chief Complaint   Patient presents with    Vomiting     every 2-3 hours since yesterday, also has colostomy that has contained diarrhea often     Initial Presentation:  46 yom to ER from home c/o N&V with high output from his colostomy x1 day after eating fish dish Thursday night  Hx ulcerative colitis and is status post a colectomy and a colostomy approximately 15 years ago  Presents tachycardic with abd distention, pain, N&V  Admission work-up showing leukocytosis, elevated procalcitonin, BUN/Cr (Baseline 1 4-1 5), hyponatremia, elevated Tbili with cholelithiasis on imaging  Admitted to inpatient status for sepsis with ARF  Started on IVABT & IVF, GI consulted  Per GI:  history of UC and is s/p total colectomy with ileostomy   He presents with vomiting, increased ostomy output, dehydration, and JENAE after eating seafood last night  - Stool studies for C  diff, o&p , enteric panel  - IV hydration  - NPO  - Trend labs  - Formal GI consult to follow    Observation to IP admission 3/28/22:  Sepsis, ARF, responding to IVABT & IVF, continue  Abd tender, ostomy output lessened, denies N&V  Trial clear liquids per GI, advance as tolerated  Stool studies including fecal calprotectin, stool enteric bacterial panel, O&P, and C  Diff are collected and in process  Blood cultures x2 collected and pending  ED Triage Vitals   Temperature Pulse Respirations Blood Pressure SpO2   03/27/22 0428 03/27/22 0428 03/27/22 0428 03/27/22 0428 03/27/22 0428   97 8 °F (36 6 °C) (!) 109 20 142/96 95 %      Temp Source Heart Rate Source Patient Position - Orthostatic VS BP Location FiO2 (%)   03/27/22 0428 03/27/22 0428 03/27/22 0428 03/27/22 0428 --   Temporal Monitor Sitting Left arm       Pain Score       03/27/22 2000       No Pain          Wt Readings from Last 1 Encounters:   03/27/22 136 kg (300 lb)     Additional Vital Signs:   03/28/22 0840 98 6 °F (37 °C) 82 -- 136/82 103 94 % -- --   03/28/22 0300 100 °F (37 8 °C) -- -- -- -- -- -- --   03/27/22 2300 100 2 °F (37 9 °C) 90 20 132/82 -- 96 % None (Room air) Lying   03/27/22 2222 98 2 °F (36 8 °C) -- -- -- -- -- -- --   03/27/22 2221 -- -- 20 132/80 -- 96 % None (Room air) Lying   03/27/22 2000 -- -- -- -- -- 96 % None (Room air) --   03/27/22 1300 100 5 °F (38 1 °C) 92 18 137/84 -- 95 % None (Room air) Sitting   03/27/22 0950 -- 93 -- -- -- 94 % -- --   03/27/22 0930 -- 90 -- 170/99 124 93 % -- --   03/27/22 0900 -- 89 -- 171/101 Abnormal  131 94 % -- --   03/27/22 0830 -- 100 -- 151/103 Abnormal  122 94 % -- --   03/27/22 0800 -- 102 -- 142/106 Abnormal  119 94 % -- --     Pertinent Labs/Diagnostic Test Results:   CT abdomen pelvis wo contrast   Final Result (03/27 5650)      Status post colectomy with right lower quadrant ileostomy        Moderately distended stomach  Multiple dilated loops of small bowel are noted  There is relative underdistention of the small bowel loops in the right lower quadrant and in the ostomy  Differential considerations include ileus versus small bowel    obstruction, correlation with the patient's symptoms recommended  Cholelithiasis without discrete evidence of acute cholecystitis, left renal atrophy and left-sided nephrolithiasis; no hydronephrosis, and other findings as above       Results from last 7 days   Lab Units 03/27/22  0554   SARS-COV-2  Negative     Results from last 7 days   Lab Units 03/28/22  0453 03/27/22  0448   WBC Thousand/uL 6 66 21 03*   HEMOGLOBIN g/dL 16 7 18 4*   HEMATOCRIT % 51 9* 56 7*   PLATELETS Thousands/uL 317 381   NEUTROS ABS Thousands/µL 4 12 18 92*     Results from last 7 days   Lab Units 03/28/22  0453 03/27/22  0448   SODIUM mmol/L 135 134*   POTASSIUM mmol/L 4 2 4 6   CHLORIDE mmol/L 102 99   CO2 mmol/L 22 21   ANION GAP mmol/L 11 14*   BUN mg/dL 30* 27*   CREATININE mg/dL 1 89* 2 14*   EGFR ml/min/1 73sq m 39 34   CALCIUM mg/dL 9 8 10 8*   MAGNESIUM mg/dL 2 3  --    PHOSPHORUS mg/dL 4 1  --      Results from last 7 days   Lab Units 03/28/22  0453 03/27/22  0448   AST U/L 26 22   ALT U/L 28 22   ALK PHOS U/L 81 100   TOTAL PROTEIN g/dL 8 4 9 3*   ALBUMIN g/dL 4 5 5 3*   TOTAL BILIRUBIN mg/dL 1 14* 1 11*     Results from last 7 days   Lab Units 03/28/22  0453 03/27/22  0448   GLUCOSE RANDOM mg/dL 125 164*     Results from last 7 days   Lab Units 03/27/22  0448   PROTIME seconds 13 9   INR  1 08   PTT seconds 31     Results from last 7 days   Lab Units 03/27/22  0448   PROCALCITONIN ng/ml 0 67*     Results from last 7 days   Lab Units 03/27/22  0553   LACTIC ACID mmol/L 2 0     Results from last 7 days   Lab Units 03/27/22  0448   LIPASE u/L 29     Results from last 7 days   Lab Units 03/27/22  1624   CLARITY UA  Clear   COLOR UA  Yellow   SPEC GRAV UA  >=1 030*   PH UA  5 5   GLUCOSE UA mg/dl Negative   KETONES UA mg/dl Negative   BLOOD UA  1+*   PROTEIN UA mg/dl 2+*   NITRITE UA  Negative   BILIRUBIN UA  Negative   UROBILINOGEN UA E U /dl 0 2   LEUKOCYTES UA  Negative   WBC UA /hpf 0-1   RBC UA /hpf 1-2   BACTERIA UA /hpf None Seen   EPITHELIAL CELLS WET PREP /hpf Occasional     Results from last 7 days   Lab Units 03/27/22  0554   INFLUENZA A PCR  Negative   INFLUENZA B PCR  Negative   RSV PCR  Negative     Results from last 7 days   Lab Units 03/27/22  0552   BLOOD CULTURE  Received in Microbiology Lab  Culture in Progress  Received in Microbiology Lab  Culture in Progress       ED Treatment:   Medication Administration from 03/27/2022 0417 to 03/28/2022 0615       Date/Time Order Dose Route Action     03/27/2022 0445 sodium chloride 0 9 % bolus 2,000 mL 2,000 mL Intravenous New Bag     03/27/2022 0445 ondansetron (ZOFRAN) injection 4 mg 4 mg Intravenous Given     03/27/2022 0739 sodium chloride 0 9 % bolus 500 mL 500 mL Intravenous New Bag     03/27/2022 0739 piperacillin-tazobactam (ZOSYN) IVPB 3 375 g 3 375 g Intravenous New Bag     03/27/2022 2056 sodium chloride 0 9 % infusion 125 mL/hr Intravenous Restarted     03/27/2022 1308 sodium chloride 0 9 % infusion 125 mL/hr Intravenous New Bag     03/27/2022 1332 acetaminophen (TYLENOL) tablet 650 mg 650 mg Oral Given     03/27/2022 1000 enoxaparin (LOVENOX) subcutaneous injection 40 mg 40 mg Subcutaneous Given     03/28/2022 0208 piperacillin-tazobactam (ZOSYN) IVPB 3 375 g 3 375 g Intravenous New Bag     03/27/2022 2000 piperacillin-tazobactam (ZOSYN) IVPB 3 375 g 3 375 g Intravenous New Bag     03/27/2022 1442 piperacillin-tazobactam (ZOSYN) IVPB 3 375 g 3 375 g Intravenous New Bag        Past Medical History:   Diagnosis Date    Ulcerative colitis ([facility] [address] )      Admitting Diagnosis: Vomiting [R11 10]  Age/Sex: 46 y o  male  Admission Orders:  Contact & airborne isolation  Scd/foot pumps  Consult GI    Scheduled Medications:  enoxaparin, 40 mg, Subcutaneous, Daily  piperacillin-tazobactam, 3 375 g, Intravenous, Q6H    Continuous IV Infusions:  sodium chloride, 75 mL/hr, Intravenous, Continuous    PRN Meds:  acetaminophen, 650 mg, Oral, Q6H PRN  ondansetron, 4 mg, Intravenous, Q6H PRN    Network Utilization Review Department  ATTENTION: Please call with any questions or concerns to [phone] and carefully listen to the prompts so that you are directed to the right person  All voicemails are confidential   Laura Campbell all requests for admission clinical reviews, approved or denied determinations and any other requests to dedicated fax number below belonging to the campus where the patient is receiving treatment   List of dedicated fax numbers for the Facilities:  [address] DENIALS (Administrative/Medical Necessity) [phone]   [address] (Maternity/NICU/Pediatrics) [phone]   [address]  [address] [facility] [place] [address] [id number] [place] [address] [address] [facility] [facility] [phone]

## 2022-03-28 NOTE — ASSESSMENT & PLAN NOTE
· Present on admission as evidence by Na of 134  · In the setting of dehydration given N/V/D and ostomy   · Improved with IVF  · No need for Nephrology evaluation at this time   · Monitor with morning labs     Lab Results   Component Value Date    SODIUM 135 03/28/2022    SODIUM 134 (L) 03/27/2022

## 2022-03-28 NOTE — ED NOTES
SLEEPING  NO STOOL NOTED IN COLOSTOMY  NO CHANGES IN STATUS  DENIES PAIN   TEMP Archie Marquis [facility]  03/28/22 1895

## 2022-03-29 VITALS
TEMPERATURE: 97.3 F | BODY MASS INDEX: 44.63 KG/M2 | SYSTOLIC BLOOD PRESSURE: 152 MMHG | HEART RATE: 94 BPM | RESPIRATION RATE: 18 BRPM | WEIGHT: 311.73 LBS | DIASTOLIC BLOOD PRESSURE: 83 MMHG | OXYGEN SATURATION: 96 % | HEIGHT: 70 IN

## 2022-03-29 LAB
ANION GAP SERPL CALCULATED.3IONS-SCNC: 10 MMOL/L (ref 4–13)
BASOPHILS # BLD AUTO: 0.05 THOUSANDS/ΜL (ref 0–0.1)
BASOPHILS NFR BLD AUTO: 1 % (ref 0–1)
BUN SERPL-MCNC: 23 MG/DL (ref 5–25)
CALCIUM SERPL-MCNC: 9.8 MG/DL (ref 8.4–10.2)
CHLORIDE SERPL-SCNC: 101 MMOL/L (ref 96–108)
CO2 SERPL-SCNC: 22 MMOL/L (ref 21–32)
CREAT SERPL-MCNC: 1.68 MG/DL (ref 0.6–1.3)
EOSINOPHIL # BLD AUTO: 0.37 THOUSAND/ΜL (ref 0–0.61)
EOSINOPHIL NFR BLD AUTO: 7 % (ref 0–6)
ERYTHROCYTE [DISTWIDTH] IN BLOOD BY AUTOMATED COUNT: 13.3 % (ref 11.6–15.1)
GFR SERPL CREATININE-BSD FRML MDRD: 45 ML/MIN/1.73SQ M
GLUCOSE SERPL-MCNC: 112 MG/DL (ref 65–140)
HCT VFR BLD AUTO: 48.3 % (ref 36.5–49.3)
HGB BLD-MCNC: 16.2 G/DL (ref 12–17)
IMM GRANULOCYTES # BLD AUTO: 0.01 THOUSAND/UL (ref 0–0.2)
IMM GRANULOCYTES NFR BLD AUTO: 0 % (ref 0–2)
LYMPHOCYTES # BLD AUTO: 1.01 THOUSANDS/ΜL (ref 0.6–4.47)
LYMPHOCYTES NFR BLD AUTO: 18 % (ref 14–44)
MCH RBC QN AUTO: 29.7 PG (ref 26.8–34.3)
MCHC RBC AUTO-ENTMCNC: 33.5 G/DL (ref 31.4–37.4)
MCV RBC AUTO: 89 FL (ref 82–98)
MONOCYTES # BLD AUTO: 1.15 THOUSAND/ΜL (ref 0.17–1.22)
MONOCYTES NFR BLD AUTO: 21 % (ref 4–12)
NEUTROPHILS # BLD AUTO: 2.9 THOUSANDS/ΜL (ref 1.85–7.62)
NEUTS SEG NFR BLD AUTO: 53 % (ref 43–75)
NRBC BLD AUTO-RTO: 0 /100 WBCS
PLATELET # BLD AUTO: 314 THOUSANDS/UL (ref 149–390)
PMV BLD AUTO: 9.9 FL (ref 8.9–12.7)
POTASSIUM SERPL-SCNC: 4.1 MMOL/L (ref 3.5–5.3)
RBC # BLD AUTO: 5.46 MILLION/UL (ref 3.88–5.62)
SODIUM SERPL-SCNC: 133 MMOL/L (ref 135–147)
WBC # BLD AUTO: 5.49 THOUSAND/UL (ref 4.31–10.16)

## 2022-03-29 PROCEDURE — 85025 COMPLETE CBC W/AUTO DIFF WBC: CPT | Performed by: NURSE PRACTITIONER

## 2022-03-29 PROCEDURE — 99239 HOSP IP/OBS DSCHRG MGMT >30: CPT | Performed by: NURSE PRACTITIONER

## 2022-03-29 PROCEDURE — 80048 BASIC METABOLIC PNL TOTAL CA: CPT | Performed by: NURSE PRACTITIONER

## 2022-03-29 RX ADMIN — ENOXAPARIN SODIUM 40 MG: 100 INJECTION SUBCUTANEOUS at 08:20

## 2022-03-29 NOTE — DISCHARGE SUMMARY
[address]  Discharge- [facility] [birth year], 46 y o  male MRN: [id number]  Unit/Bed#: APU 04 Encounter: [id number]  Primary Care Provider: Soo Cleaning DO   Date and time admitted to hospital: 3/27/2022  4:26 AM    * Sepsis with acute renal failure without septic shock [facility])  Assessment & Plan  Background: Presented to the ED after persistent diarrhea and intractable N/V    · Met sepsis criteria on admission with WBC 21 03 and  most likely secondary to an intra-abdominal  · Likely secondary to vital gastroenteritis  · Procalcitonin testing is elevated at 0 67; however low suspicion for bacterial etiology  · NPO> surgical soft> regular diet; all tolerated  · Continue IV fluids unless diet advanced   · Continue IV Zosyn; discontinued per gastroenterology team  · Blood cultures negative thus far at time of discharge   · Stool enteric negative, calprotectin pending, C diff negative, and ova/parasite pending   · Gastroenterology consulted; input appreciated   · WBC improved rapidly     Intra-abdominal infection  Assessment & Plan  · CT abdomen with the following results:Status post colectomy with right lower quadrant ileostomy  Moderately distended stomach   Multiple dilated loops of small bowel are noted   There is relative underdistention of the small bowel loops in the right lower quadrant and in the ostomy   Differential considerations include ileus versus small bowel obstruction, correlation with the patient's symptoms recommended  Cholelithiasis without discrete evidence of acute cholecystitis, left renal atrophy and left-sided nephrolithiasis; no hydronephrosis, and other findings as above     · With history of UC in the past  · GI consulted; input as noted above         Morbid obesity with BMI of 40 0-44 9, adult ([facility] )  Assessment & Plan  · Actual BMI 43 05  · Dietary, weight loss, and lifestyle modification counseling was provided    Dehydration with hyponatremia  Assessment & Plan  · Present on admission as evidence by Na of 134  · In the setting of dehydration given N/V/D and ostomy   · Improved with IVF; stable  · No need for Nephrology evaluation at this time   · Encouraged adequate p o  intake at discharge  Lab Results   Component Value Date    SODIUM 133 (L) 03/29/2022    SODIUM 135 03/28/2022    SODIUM 134 (L) 03/27/2022         Acute kidney injury (nontraumatic) (HCC)  Assessment & Plan  · Present on admission as evidence by creatinine 2 14  · Suspect multifactorial pre renal/ATN secondary to severe dehydration and infection, patient also noted to have a hemoglobin of 18 4 with a hematocrit of 56 7  · Baseline appears to be approximately 1 4-1 5  · Further trend as noted above; improved with IVF   · Responded to IVF; continue for now  · Avoid nephrotoxins, NSAIDS, and relative hypotension if possible   · Renally dose medications as appropriate   · Rapidly improved with IV fluid; encourage adequate oral intake at time of discharge    Lab Results   Component Value Date    CREATININE 1 68 (H) 03/29/2022    CREATININE 1 89 (H) 03/28/2022    CREATININE 2 14 (H) 03/27/2022           Discharging Physician / Practitioner: ANITA Walker  PCP: Preet Armendariz DO  Admission Date:   Admission Orders (From admission, onward)     Ordered        03/28/22 0955  Inpatient Admission  Once            03/27/22 0725  Place in Observation  Once                      Discharge Date: 03/29/22    Medical Problems             Resolved Problems  Date Reviewed: 3/29/2022    None                Consultations During Hospital Stay:  IP CONSULT TO GASTROENTEROLOGY    Procedures Performed:   CT abdomen pelvis wo contrast Result Date: 3/27/2022  · Narrative: CT ABDOMEN AND PELVIS WITHOUT IV CONTRAST INDICATION:   Abdominal pain, acute, nonlocalized Generalized abdominal pain  COMPARISON:  CT abdomen pelvis dated 12/14/2020   TECHNIQUE:  CT examination of the abdomen and pelvis was performed without intravenous contrast   Axial, sagittal, and coronal 2D reformatted images were created from the source data and submitted for interpretation  Radiation dose length product (DLP) for this visit:  1618 05 mGy-cm   This examination, like all CT scans performed in the [facility], was performed utilizing techniques to minimize radiation dose exposure, including the use of iterative reconstruction and automated exposure control  Enteric contrast was not administered  FINDINGS: ABDOMEN LOWER CHEST:  No clinically significant abnormality identified in the visualized lower chest  LIVER/BILIARY TREE:  Unremarkable  GALLBLADDER:  There are gallstone(s) within the gallbladder, without pericholecystic inflammatory changes  SPLEEN:  Unremarkable  PANCREAS:  Unremarkable  ADRENAL GLANDS:  Unremarkable  KIDNEYS/URETERS:  Moderate left renal atrophy  1 2 cm stone in the left renal pelvis; no hydronephrosis  STOMACH AND BOWEL: Status post colectomy  Right lower quadrant ileostomy Moderately distended stomach  Multiple dilated loops of small bowel are noted  There is relative underdistention of the small bowel loops in the right lower quadrant  Differential considerations include ileus versus small bowel obstruction, correlation with the patient's symptoms recommended  APPENDIX:  Surgically absent ABDOMINOPELVIC CAVITY:  No ascites  No pneumoperitoneum  No lymphadenopathy  VESSELS:  Mild atherosclerosis; no aortic aneurysm PELVIS REPRODUCTIVE ORGANS:  Unremarkable for patient's age  URINARY BLADDER:  Unremarkable  ABDOMINAL WALL/INGUINAL REGIONS:  Bilateral small fat-containing inguinal hernias  Right lower quadrant ostomy with parastomal hernia containing fat and loops of small bowel, similar in appearance to the prior  OSSEOUS STRUCTURES: Degenerative changes of the spine  No acute fracture or destructive osseous lesion   Impression: Status post colectomy with right lower quadrant ileostomy  Moderately distended stomach  Multiple dilated loops of small bowel are noted  There is relative underdistention of the small bowel loops in the right lower quadrant and in the ostomy  Differential considerations include ileus versus small bowel obstruction, correlation with the patient's symptoms recommended  Cholelithiasis without discrete evidence of acute cholecystitis, left renal atrophy and left-sided nephrolithiasis; no hydronephrosis, and other findings as above  Workstation performed: [id number]       Significant Findings / Test Results:   · As noted above    Incidental Findings:   · None     Test Results Pending at Discharge (will require follow up):   · Ova/parasite and calprotectin stool studies; to be followed up outpatient by Gastroenterology and or PCP     Outpatient Tests Requested:  · None    Complications:  None    Past Medical History:   Diagnosis Date    Ulcerative colitis ([facility] )        Reason for Admission: Vomiting (every 2-3 hours since yesterday, also has colostomy that has contained diarrhea often)       Hospital Course:     Megan Holden is a 46 y o  male patient with past medical history of ulcerative colitis status post ostomy who originally presented to the hospital on 3/27/2022 due to Vomiting (every 2-3 hours since yesterday, also has colostomy that has contained diarrhea often) on presentation to the emergency department he did meet sepsis criteria as noted above  Initially started on IV Zosyn however given rapid improvement with IV fluid as well as IV antibiotics gastroenterology did see and evaluate and recommended discontinuation of antibiotics as highly suspect viral gastroenteritis etiology  His diet was initially NPO however advanced as tolerated and tolerating regular diet at time of discharge  Patient feels as if this is like a stomach bug he has had before and currently requesting to go home and will follow up outpatient with his PCP    Patient currently stable ready for discharge  Please see above list of diagnoses and related plan for additional information  Condition at Discharge: stable     Discharge Day Visit / Exam:     Subjective:  Denies any nausea, vomiting, or diarrhea and reports that he is not on any antiemetics at this time  Vitals: Blood Pressure: 152/83 (03/29/22 0700)  Pulse: 94 (03/29/22 0700)  Temperature: (!) 97 3 °F (36 3 °C) (03/29/22 0700)  Temp Source: Temporal (03/29/22 0700)  Respirations: 18 (03/29/22 0700)  Height: 5' 10" (177 8 cm) (03/27/22 0428)  Weight - Scale: (!) 141 kg (311 lb 11 7 oz) (03/28/22 2220)  SpO2: 96 % (03/29/22 0700)  Exam:   Physical Exam  Constitutional:       Appearance: He is obese  He is not ill-appearing  Cardiovascular:      Rate and Rhythm: Normal rate and regular rhythm  Pulses: Normal pulses  Heart sounds: Normal heart sounds  Pulmonary:      Effort: Pulmonary effort is normal       Breath sounds: Normal breath sounds  Abdominal:      General: Bowel sounds are normal  There is no distension  Palpations: Abdomen is soft  Tenderness: There is no abdominal tenderness  Skin:     General: Skin is warm  Capillary Refill: Capillary refill takes less than 2 seconds  Neurological:      Mental Status: He is alert and oriented to person, place, and time  Psychiatric:         Behavior: Behavior normal  Behavior is cooperative  Discussion with Family:  Decline however I did offer    Discharge instructions/Information to patient and family:   See after visit summary for information provided to patient and family  Provisions for Follow-Up Care:  See after visit summary for information related to follow-up care and any pertinent home health orders  Disposition:     Home    Discharge Statement:  I spent 45 minutes discharging the patient  This time was spent on the day of discharge  I had direct contact with the patient on the day of discharge   Greater than 50% of the total time was spent examining patient, answering all patient questions, arranging and discussing plan of care with patient as well as directly providing post-discharge instructions  Additional time then spent on discharge activities  Discharge Medications:  See after visit summary for reconciled discharge medications provided to patient and family        ** Please Note: This note has been constructed using a voice recognition system **

## 2022-03-29 NOTE — PLAN OF CARE
Problem: PAIN - ADULT  Goal: Verbalizes/displays adequate comfort level or baseline comfort level  Description: Interventions:  - Encourage patient to monitor pain and request assistance  - Assess pain using appropriate pain scale  - Administer analgesics based on type and severity of pain and evaluate response  - Implement non-pharmacological measures as appropriate and evaluate response  - Consider cultural and social influences on pain and pain management  - Notify physician/advanced practitioner if interventions unsuccessful or patient reports new pain  Outcome: Progressing     Problem: INFECTION - ADULT  Goal: Absence or prevention of progression during hospitalization  Description: INTERVENTIONS:  - Assess and monitor for signs and symptoms of infection  - Monitor lab/diagnostic results  - Monitor all insertion sites, i e  indwelling lines, tubes, and drains  - Monitor endotracheal if appropriate and nasal secretions for changes in amount and color  - [place] appropriate cooling/warming therapies per order  - Administer medications as ordered  - Instruct and encourage patient and family to use good hand hygiene technique  - Identify and instruct in appropriate isolation precautions for identified infection/condition  Outcome: Progressing  Goal: Absence of fever/infection during neutropenic period  Description: INTERVENTIONS:  - Monitor WBC    Outcome: Progressing     Problem: SAFETY ADULT  Goal: Maintain or return to baseline ADL function  Description: INTERVENTIONS:  -  Assess patient's ability to carry out ADLs; assess patient's baseline for ADL function and identify physical deficits which impact ability to perform ADLs (bathing, care of mouth/teeth, toileting, grooming, dressing, etc )  - Assess/evaluate cause of self-care deficits   - Assess range of motion  - Assess patient's mobility; develop plan if impaired  - Assess patient's need for assistive devices and provide as appropriate  - Encourage maximum independence but intervene and supervise when necessary  - Involve family in performance of ADLs  - Assess for home care needs following discharge   - Consider OT consult to assist with ADL evaluation and planning for discharge  - Provide patient education as appropriate  Outcome: Progressing  Goal: Maintains/Returns to pre admission functional level  Description: INTERVENTIONS:  - Perform BMAT or MOVE assessment daily    - Set and communicate daily mobility goal to care team and patient/family/caregiver  - Collaborate with rehabilitation services on mobility goals if consulted  - Perform Range of Motion 3 times a day  - Reposition patient every 2 hours  - Dangle patient 3 times a day  - Stand patient 3 times a day  - Ambulate patient 3 times a day  - Out of bed to chair 3 times a day   - Out of bed for meals 3 times a day  - Out of bed for toileting  - Record patient progress and toleration of activity level   Outcome: Progressing     Problem: DISCHARGE PLANNING  Goal: Discharge to home or other facility with appropriate resources  Description: INTERVENTIONS:  - Identify barriers to discharge w/patient and caregiver  - Arrange for needed discharge resources and transportation as appropriate  - Identify discharge learning needs (meds, wound care, etc )  - Arrange for interpretive services to assist at discharge as needed  - Refer to Case Management Department for coordinating discharge planning if the patient needs post-hospital services based on physician/advanced practitioner order or complex needs related to functional status, cognitive ability, or social support system  Outcome: Progressing     Problem: Knowledge Deficit  Goal: Patient/family/caregiver demonstrates understanding of disease process, treatment plan, medications, and discharge instructions  Description: Complete learning assessment and assess knowledge base    Interventions:  - Provide teaching at level of understanding  - Provide teaching via preferred learning methods  Outcome: Progressing     Problem: Potential for Falls  Goal: Patient will remain free of falls  Description: INTERVENTIONS:  - Educate patient/family on patient safety including physical limitations  - Instruct patient to call for assistance with activity   - Consult OT/PT to assist with strengthening/mobility   - Keep Call bell within reach  - Keep bed low and locked with side rails adjusted as appropriate  - Keep care items and personal belongings within reach  - Initiate and maintain comfort rounds  - Make Fall Risk Sign visible to staff  - Offer Toileting every 2 Hours, in advance of need  - Initiate/Maintain bed alarm  - Obtain necessary fall risk management equipment: walker   - Apply yellow socks and bracelet for high fall risk patients  - Consider moving patient to room near nurses station  Outcome: Progressing

## 2022-03-29 NOTE — PLAN OF CARE
Problem: PAIN - ADULT  Goal: Verbalizes/displays adequate comfort level or baseline comfort level  Description: Interventions:  - Encourage patient to monitor pain and request assistance  - Assess pain using appropriate pain scale  - Administer analgesics based on type and severity of pain and evaluate response  - Implement non-pharmacological measures as appropriate and evaluate response  - Consider cultural and social influences on pain and pain management  - Notify physician/advanced practitioner if interventions unsuccessful or patient reports new pain  Outcome: Progressing     Problem: INFECTION - ADULT  Goal: Absence or prevention of progression during hospitalization  Description: INTERVENTIONS:  - Assess and monitor for signs and symptoms of infection  - Monitor lab/diagnostic results  - Monitor all insertion sites, i e  indwelling lines, tubes, and drains  - Monitor endotracheal if appropriate and nasal secretions for changes in amount and color  - [place] appropriate cooling/warming therapies per order  - Administer medications as ordered  - Instruct and encourage patient and family to use good hand hygiene technique  - Identify and instruct in appropriate isolation precautions for identified infection/condition  Outcome: Progressing  Goal: Absence of fever/infection during neutropenic period  Description: INTERVENTIONS:  - Monitor WBC    Outcome: Progressing     Problem: SAFETY ADULT  Goal: Maintain or return to baseline ADL function  Description: INTERVENTIONS:  -  Assess patient's ability to carry out ADLs; assess patient's baseline for ADL function and identify physical deficits which impact ability to perform ADLs (bathing, care of mouth/teeth, toileting, grooming, dressing, etc )  - Assess/evaluate cause of self-care deficits   - Assess range of motion  - Assess patient's mobility; develop plan if impaired  - Assess patient's need for assistive devices and provide as appropriate  - Encourage maximum independence but intervene and supervise when necessary  - Involve family in performance of ADLs  - Assess for home care needs following discharge   - Consider OT consult to assist with ADL evaluation and planning for discharge  - Provide patient education as appropriate  Outcome: Progressing  Goal: Maintains/Returns to pre admission functional level  Description: INTERVENTIONS:  - Perform BMAT or MOVE assessment daily    - Set and communicate daily mobility goal to care team and patient/family/caregiver  - Collaborate with rehabilitation services on mobility goals if consulted  - Perform Range of Motion 3 times a day  - Reposition patient every 2 hours  - Dangle patient 3 times a day  - Stand patient 3 times a day  - Ambulate patient 3 times a day  - Out of bed to chair 3 times a day   - Out of bed for meals 3 times a day  - Out of bed for toileting  - Record patient progress and toleration of activity level   Outcome: Progressing     Problem: DISCHARGE PLANNING  Goal: Discharge to home or other facility with appropriate resources  Description: INTERVENTIONS:  - Identify barriers to discharge w/patient and caregiver  - Arrange for needed discharge resources and transportation as appropriate  - Identify discharge learning needs (meds, wound care, etc )  - Arrange for interpretive services to assist at discharge as needed  - Refer to Case Management Department for coordinating discharge planning if the patient needs post-hospital services based on physician/advanced practitioner order or complex needs related to functional status, cognitive ability, or social support system  Outcome: Progressing     Problem: Knowledge Deficit  Goal: Patient/family/caregiver demonstrates understanding of disease process, treatment plan, medications, and discharge instructions  Description: Complete learning assessment and assess knowledge base    Interventions:  - Provide teaching at level of understanding  - Provide teaching via preferred learning methods  Outcome: Progressing

## 2022-03-29 NOTE — ASSESSMENT & PLAN NOTE
Background: Presented to the ED after persistent diarrhea and intractable N/V    · Met sepsis criteria on admission with WBC 21 03 and  most likely secondary to an intra-abdominal  · Likely secondary to vital gastroenteritis  · Procalcitonin testing is elevated at 0 67; however low suspicion for bacterial etiology  · NPO> surgical soft> regular diet; all tolerated  · Continue IV fluids unless diet advanced   · Continue IV Zosyn; discontinued per gastroenterology team  · Blood cultures negative thus far at time of discharge   · Stool enteric negative, calprotectin pending, C diff negative, and ova/parasite pending   · Gastroenterology consulted; input appreciated   · WBC improved rapidly

## 2022-03-29 NOTE — ASSESSMENT & PLAN NOTE
· Present on admission as evidence by creatinine 2 14  · Suspect multifactorial pre renal/ATN secondary to severe dehydration and infection, patient also noted to have a hemoglobin of 18 4 with a hematocrit of 56 7  · Baseline appears to be approximately 1 4-1 5  · Further trend as noted above; improved with IVF   · Responded to IVF; continue for now  · Avoid nephrotoxins, NSAIDS, and relative hypotension if possible   · Renally dose medications as appropriate   · Rapidly improved with IV fluid; encourage adequate oral intake at time of discharge    Lab Results   Component Value Date    CREATININE 1 68 (H) 03/29/2022    CREATININE 1 89 (H) 03/28/2022    CREATININE 2 14 (H) 03/27/2022

## 2022-03-29 NOTE — ASSESSMENT & PLAN NOTE
· Present on admission as evidence by Na of 134  · In the setting of dehydration given N/V/D and ostomy   · Improved with IVF; stable  · No need for Nephrology evaluation at this time   · Encouraged adequate p o  intake at discharge  Lab Results   Component Value Date    SODIUM 133 (L) 03/29/2022    SODIUM 135 03/28/2022    SODIUM 134 (L) 03/27/2022

## 2022-03-29 NOTE — DISCHARGE INSTR - AVS FIRST PAGE
[address] [place] [id number]  Dept: [phone]    March 29, 2022     Patient: Nara Forte   YOB: 1970   Date of Visit: 3/27/2022       To Whom it May Concern:    Josh Plummer is under my professional care  He was seen in the hospital from 3/27/2022   to 03/29/22  He may return to work on Wednesday, March 30th, 2022 without limitations  If you have any questions or concerns, please don't hesitate to call           Sincerely,          ANITA White

## 2022-03-30 LAB — CALPROTECTIN STL-MCNT: 1761 UG/G (ref 0–120)

## 2022-03-30 NOTE — UTILIZATION REVIEW
Inpatient Admission Authorization Request   NOTIFICATION OF INPATIENT ADMISSION/INPATIENT AUTHORIZATION REQUEST   SERVICING FACILITY:   [place] [address]  [address] [facility] [address], [address]  Tax ID: [id number]  NPI: [id number]  Place of Service: Inpatient [address] [facility]  [address]  Place of Service Code: 24     ATTENDING PROVIDER:  Attending Name and NPI#: [place], [place] [[id number]]  Address: [address] [facility] [address], [address]  Phone: [phone]     UTILIZATION REVIEW CONTACT:  Marilee Roque, Utilization   Network Utilization Review Department  Phone: [phone]  Fax [phone]  Email: [email or web address]     PHYSICIAN ADVISORY SERVICES:  FOR [id number] REVIEW - MEDICAL NECESSITY DENIAL  Phone: [phone]  Fax: [phone]  Email: [email or web address]     TYPE OF REQUEST:  Inpatient Status     ADMISSION INFORMATION:  ADMISSION DATE/TIME: 3/28/22  9:55 AM  PATIENT DIAGNOSIS CODE/DESCRIPTION:  Gastroenteritis [K52 9]  Vomiting [R11 10]  Nausea & vomiting [R11 2]  Acute kidney injury (JENAE) with acute tubular necrosis (ATN) ([facility] ) [N17 0]  DISCHARGE DATE/TIME: 3/29/2022 12:57 PM   IMPORTANT INFORMATION:  Please contact the Marilee Roque directly with any questions or concerns regarding this request  Department voicemails are confidential     Send requests for admission clinical reviews, concurrent reviews, approvals, and administrative denials due to lack of clinical to fax [phone]

## 2022-03-30 NOTE — UTILIZATION REVIEW
Initial Clinical Review    Observation 3/27/22 @ 0725, converted to inpatient admission 3/28/22 @ [phone] for continued care & tx for sepsis, ARF    Admission: Date/Time/Statement:   Admission Orders (From admission, onward)     Ordered        03/28/22 0955  Inpatient Admission  Once            03/27/22 0725  Place in Observation  Once                      Orders Placed This Encounter   Procedures   Inpatient Admission    Standing Status:   Standing    Number of Occurrences:   1    Order Specific Question:   Level of Care    Answer:   Med Surg [16]    Order Specific Question:   Estimated length of stay    Answer:   More than 2 Midnights    Order Specific Question:   Certification    Answer:   I certify that inpatient services are medically necessary for this patient for a duration of greater than two midnights  See H&P and MD Progress Notes for additional information about the patient's course of treatment  ED Arrival Information     Expected Arrival Acuity    - 3/27/2022 04:17 Urgent         Means of arrival Escorted by Service Admission type    CHRISTY OSMAN  [facility] Hospitalist Urgent         Arrival complaint    vomiting diarrhea        Chief Complaint   Patient presents with    Vomiting     every 2-3 hours since yesterday, also has colostomy that has contained diarrhea often     Initial Presentation:  46 yom to ER from home c/o N&V with high output from his colostomy x1 day after eating fish dish Thursday night  Hx ulcerative colitis and is status post a colectomy and a colostomy approximately 15 years ago  Presents tachycardic with abd distention, pain, N&V  Admission work-up showing leukocytosis, elevated procalcitonin, BUN/Cr (Baseline 1 4-1 5), hyponatremia, elevated Tbili with cholelithiasis on imaging  Admitted to inpatient status for sepsis with ARF  Started on IVABT & IVF, GI consulted  Per GI:  history of UC and is s/p total colectomy with ileostomy   He presents with vomiting, increased ostomy output, dehydration, and JENAE after eating seafood last night  - Stool studies for C  diff, o&p , enteric panel  - IV hydration  - NPO  - Trend labs  - Formal GI consult to follow    Observation to IP admission 3/28/22:  Sepsis, ARF, responding to IVABT & IVF, continue  Abd tender, ostomy output lessened, denies N&V  Trial clear liquids per GI, advance as tolerated  Stool studies including fecal calprotectin, stool enteric bacterial panel, O&P, and C  Diff are collected and in process  Blood cultures x2 collected and pending  ED Triage Vitals   Temperature Pulse Respirations Blood Pressure SpO2   03/27/22 0428 03/27/22 0428 03/27/22 0428 03/27/22 0428 03/27/22 0428   97 8 °F (36 6 °C) (!) 109 20 142/96 95 %      Temp Source Heart Rate Source Patient Position - Orthostatic VS BP Location FiO2 (%)   03/27/22 0428 03/27/22 0428 03/27/22 0428 03/27/22 0428 --   Temporal Monitor Sitting Left arm       Pain Score       03/27/22 2000       No Pain          Wt Readings from Last 1 Encounters:   03/28/22 (!) 141 kg (311 lb 11 7 oz)     Additional Vital Signs:   03/28/22 0840 98 6 °F (37 °C) 82 -- 136/82 103 94 % -- --   03/28/22 0300 100 °F (37 8 °C) -- -- -- -- -- -- --   03/27/22 2300 100 2 °F (37 9 °C) 90 20 132/82 -- 96 % None (Room air) Lying   03/27/22 2222 98 2 °F (36 8 °C) -- -- -- -- -- -- --   03/27/22 2221 -- -- 20 132/80 -- 96 % None (Room air) Lying   03/27/22 2000 -- -- -- -- -- 96 % None (Room air) --   03/27/22 1300 100 5 °F (38 1 °C) 92 18 137/84 -- 95 % None (Room air) Sitting   03/27/22 0950 -- 93 -- -- -- 94 % -- --   03/27/22 0930 -- 90 -- 170/99 124 93 % -- --   03/27/22 0900 -- 89 -- 171/101 Abnormal  131 94 % -- --   03/27/22 0830 -- 100 -- 151/103 Abnormal  122 94 % -- --   03/27/22 0800 -- 102 -- 142/106 Abnormal  119 94 % -- --     Pertinent Labs/Diagnostic Test Results:   CT abdomen pelvis wo contrast   Final Result (03/27 0596)      Status post colectomy with right lower quadrant ileostomy  Moderately distended stomach  Multiple dilated loops of small bowel are noted  There is relative underdistention of the small bowel loops in the right lower quadrant and in the ostomy  Differential considerations include ileus versus small bowel    obstruction, correlation with the patient's symptoms recommended  Cholelithiasis without discrete evidence of acute cholecystitis, left renal atrophy and left-sided nephrolithiasis; no hydronephrosis, and other findings as above       Results from last 7 days   Lab Units 03/27/22  0554   SARS-COV-2  Negative     Results from last 7 days   Lab Units 03/29/22  0605 03/28/22  0453 03/27/22  0448   WBC Thousand/uL 5 49 6 66 21 03*   HEMOGLOBIN g/dL 16 2 16 7 18 4*   HEMATOCRIT % 48 3 51 9* 56 7*   PLATELETS Thousands/uL 314 317 381   NEUTROS ABS Thousands/µL 2 90 4 12 18 92*     Results from last 7 days   Lab Units 03/29/22  0605 03/28/22  0453 03/27/22  0448   SODIUM mmol/L 133* 135 134*   POTASSIUM mmol/L 4 1 4 2 4 6   CHLORIDE mmol/L 101 102 99   CO2 mmol/L 22 22 21   ANION GAP mmol/L 10 11 14*   BUN mg/dL 23 30* 27*   CREATININE mg/dL 1 68* 1 89* 2 14*   EGFR ml/min/1 73sq m 45 39 34   CALCIUM mg/dL 9 8 9 8 10 8*   MAGNESIUM mg/dL  --  2 3  --    PHOSPHORUS mg/dL  --  4 1  --      Results from last 7 days   Lab Units 03/28/22  0453 03/27/22  0448   AST U/L 26 22   ALT U/L 28 22   ALK PHOS U/L 81 100   TOTAL PROTEIN g/dL 8 4 9 3*   ALBUMIN g/dL 4 5 5 3*   TOTAL BILIRUBIN mg/dL 1 14* 1 11*     Results from last 7 days   Lab Units 03/29/22  0605 03/28/22  0453 03/27/22  0448   GLUCOSE RANDOM mg/dL 112 125 164*     Results from last 7 days   Lab Units 03/27/22  0448   PROTIME seconds 13 9   INR  1 08   PTT seconds 31     Results from last 7 days   Lab Units 03/27/22  0448   PROCALCITONIN ng/ml 0 67*     Results from last 7 days   Lab Units 03/27/22  0553   LACTIC ACID mmol/L 2 0     Results from last 7 days   Lab Units 03/27/22  0448   LIPASE u/L 29     Results from last 7 days   Lab Units 03/27/22  1624   CLARITY UA  Clear   COLOR UA  Yellow   SPEC GRAV UA  >=1 030*   PH UA  5 5   GLUCOSE UA mg/dl Negative   KETONES UA mg/dl Negative   BLOOD UA  1+*   PROTEIN UA mg/dl 2+*   NITRITE UA  Negative   BILIRUBIN UA  Negative   UROBILINOGEN UA E U /dl 0 2   LEUKOCYTES UA  Negative   WBC UA /hpf 0-1   RBC UA /hpf 1-2   BACTERIA UA /hpf None Seen   EPITHELIAL CELLS WET PREP /hpf Occasional     Results from last 7 days   Lab Units 03/27/22  0554   INFLUENZA A PCR  Negative   INFLUENZA B PCR  Negative   RSV PCR  Negative     Results from last 7 days   Lab Units 03/27/22  0552   BLOOD CULTURE  No Growth at 48 hrs  No Growth at 48 hrs       ED Treatment:   Medication Administration from 03/27/2022 0417 to 03/28/2022 0615       Date/Time Order Dose Route Action     03/27/2022 0445 sodium chloride 0 9 % bolus 2,000 mL 2,000 mL Intravenous New Bag     03/27/2022 0445 ondansetron (ZOFRAN) injection 4 mg 4 mg Intravenous Given     03/27/2022 0739 sodium chloride 0 9 % bolus 500 mL 500 mL Intravenous New Bag     03/27/2022 0739 piperacillin-tazobactam (ZOSYN) IVPB 3 375 g 3 375 g Intravenous New Bag     03/27/2022 2056 sodium chloride 0 9 % infusion 125 mL/hr Intravenous Restarted     03/27/2022 1308 sodium chloride 0 9 % infusion 125 mL/hr Intravenous New Bag     03/27/2022 1332 acetaminophen (TYLENOL) tablet 650 mg 650 mg Oral Given     03/27/2022 1000 enoxaparin (LOVENOX) subcutaneous injection 40 mg 40 mg Subcutaneous Given     03/28/2022 0208 piperacillin-tazobactam (ZOSYN) IVPB 3 375 g 3 375 g Intravenous New Bag     03/27/2022 2000 piperacillin-tazobactam (ZOSYN) IVPB 3 375 g 3 375 g Intravenous New Bag     03/27/2022 1442 piperacillin-tazobactam (ZOSYN) IVPB 3 375 g 3 375 g Intravenous New Bag        Past Medical History:   Diagnosis Date    Ulcerative colitis ([facility] [address] )      Admitting Diagnosis: Gastroenteritis [K52 9]  Vomiting [R11 10]  Nausea & vomiting [R11 2]  Acute kidney injury (JENAE) with acute tubular necrosis (ATN) (HCC) [N17 0]  Age/Sex: 46 y o  male  Admission Orders:  Contact & airborne isolation  Scd/foot pumps  Consult GI    Scheduled Medications:  enoxaparin, 40 mg, Subcutaneous, Daily  piperacillin-tazobactam, 3 375 g, Intravenous, Q6H    Continuous IV Infusions:  sodium chloride, 75 mL/hr, Intravenous, Continuous    PRN Meds:  acetaminophen, 650 mg, Oral, Q6H PRN  ondansetron, 4 mg, Intravenous, Q6H PRN    Network Utilization Review Department  ATTENTION: Please call with any questions or concerns to [phone] and carefully listen to the prompts so that you are directed to the right person  All voicemails are confidential   Merline Allyson all requests for admission clinical reviews, approved or denied determinations and any other requests to dedicated fax number below belonging to the campus where the patient is receiving treatment   List of dedicated fax numbers for the Facilities:  [address] DENIALS (Administrative/Medical Necessity) [phone]   [address] (Maternity/NICU/Pediatrics) [phone]   [address]  [address] Medical [place] [address] [address] [place] [address] [address] [facility] [facility] [phone]

## 2022-04-01 ENCOUNTER — APPOINTMENT (EMERGENCY)
Dept: CT IMAGING | Facility: HOSPITAL | Age: 52
DRG: 683 | End: 2022-04-01
Payer: COMMERCIAL

## 2022-04-01 ENCOUNTER — HOSPITAL ENCOUNTER (INPATIENT)
Facility: HOSPITAL | Age: 52
LOS: 3 days | Discharge: HOME | DRG: 683 | End: 2022-04-04
Attending: EMERGENCY MEDICINE | Admitting: INTERNAL MEDICINE
Payer: COMMERCIAL

## 2022-04-01 DIAGNOSIS — E87.1 HYPONATREMIA: ICD-10-CM

## 2022-04-01 DIAGNOSIS — N17.9 AKI (ACUTE KIDNEY INJURY) (HCC): Primary | ICD-10-CM

## 2022-04-01 DIAGNOSIS — K56.7 ILEUS (HCC): ICD-10-CM

## 2022-04-01 DIAGNOSIS — B99.9 INTRA-ABDOMINAL INFECTION: ICD-10-CM

## 2022-04-01 DIAGNOSIS — E66.01 MORBID OBESITY WITH BMI OF 40.0-44.9, ADULT (HCC): Primary | ICD-10-CM

## 2022-04-01 PROBLEM — D72.829 LEUKOCYTOSIS: Status: ACTIVE | Noted: 2022-04-01

## 2022-04-01 LAB
ALBUMIN SERPL BCP-MCNC: 5.4 G/DL (ref 3.5–5)
ALP SERPL-CCNC: 122 U/L (ref 34–104)
ALT SERPL W P-5'-P-CCNC: 49 U/L (ref 7–52)
ANION GAP SERPL CALCULATED.3IONS-SCNC: 13 MMOL/L (ref 4–13)
ANION GAP SERPL CALCULATED.3IONS-SCNC: 16 MMOL/L (ref 4–13)
ANION GAP SERPL CALCULATED.3IONS-SCNC: 21 MMOL/L (ref 4–13)
AST SERPL W P-5'-P-CCNC: 37 U/L (ref 13–39)
BACTERIA BLD CULT: NORMAL
BACTERIA BLD CULT: NORMAL
BACTERIA UR QL AUTO: ABNORMAL /HPF
BASOPHILS # BLD AUTO: 0.08 THOUSANDS/ΜL (ref 0–0.1)
BASOPHILS NFR BLD AUTO: 1 % (ref 0–1)
BILIRUB SERPL-MCNC: 0.66 MG/DL (ref 0.2–1)
BILIRUB UR QL STRIP: ABNORMAL
BUN SERPL-MCNC: 60 MG/DL (ref 5–25)
BUN SERPL-MCNC: 63 MG/DL (ref 5–25)
BUN SERPL-MCNC: 67 MG/DL (ref 5–25)
CALCIUM SERPL-MCNC: 10.8 MG/DL (ref 8.4–10.2)
CALCIUM SERPL-MCNC: 9.5 MG/DL (ref 8.4–10.2)
CALCIUM SERPL-MCNC: 9.8 MG/DL (ref 8.4–10.2)
CHLORIDE SERPL-SCNC: 88 MMOL/L (ref 96–108)
CHLORIDE SERPL-SCNC: 88 MMOL/L (ref 96–108)
CHLORIDE SERPL-SCNC: 89 MMOL/L (ref 96–108)
CLARITY UR: CLEAR
CO2 SERPL-SCNC: 15 MMOL/L (ref 21–32)
CO2 SERPL-SCNC: 23 MMOL/L (ref 21–32)
CO2 SERPL-SCNC: 27 MMOL/L (ref 21–32)
COLOR UR: YELLOW
CREAT SERPL-MCNC: 4.34 MG/DL (ref 0.6–1.3)
CREAT SERPL-MCNC: 4.35 MG/DL (ref 0.6–1.3)
CREAT SERPL-MCNC: 5.2 MG/DL (ref 0.6–1.3)
CREAT UR-MCNC: 282 MG/DL
CREAT UR-MCNC: 596 MG/DL
EOSINOPHIL # BLD AUTO: 0.03 THOUSAND/ΜL (ref 0–0.61)
EOSINOPHIL NFR BLD AUTO: 0 % (ref 0–6)
ERYTHROCYTE [DISTWIDTH] IN BLOOD BY AUTOMATED COUNT: 12.7 % (ref 11.6–15.1)
GFR SERPL CREATININE-BSD FRML MDRD: 11 ML/MIN/1.73SQ M
GFR SERPL CREATININE-BSD FRML MDRD: 14 ML/MIN/1.73SQ M
GFR SERPL CREATININE-BSD FRML MDRD: 14 ML/MIN/1.73SQ M
GLUCOSE SERPL-MCNC: 117 MG/DL (ref 65–140)
GLUCOSE SERPL-MCNC: 132 MG/DL (ref 65–140)
GLUCOSE SERPL-MCNC: 155 MG/DL (ref 65–140)
GLUCOSE UR STRIP-MCNC: NEGATIVE MG/DL
HCT VFR BLD AUTO: 55.7 % (ref 36.5–49.3)
HGB BLD-MCNC: 19.4 G/DL (ref 12–17)
HGB UR QL STRIP.AUTO: ABNORMAL
HYALINE CASTS #/AREA URNS LPF: ABNORMAL /LPF
IMM GRANULOCYTES # BLD AUTO: 0.13 THOUSAND/UL (ref 0–0.2)
IMM GRANULOCYTES NFR BLD AUTO: 1 % (ref 0–2)
KETONES UR STRIP-MCNC: ABNORMAL MG/DL
LACTATE SERPL-SCNC: 1.9 MMOL/L (ref 0.5–2)
LEUKOCYTE ESTERASE UR QL STRIP: NEGATIVE
LIPASE SERPL-CCNC: 105 U/L (ref 11–82)
LYMPHOCYTES # BLD AUTO: 1.29 THOUSANDS/ΜL (ref 0.6–4.47)
LYMPHOCYTES NFR BLD AUTO: 11 % (ref 14–44)
MAGNESIUM SERPL-MCNC: 2.4 MG/DL (ref 1.9–2.7)
MCH RBC QN AUTO: 29.8 PG (ref 26.8–34.3)
MCHC RBC AUTO-ENTMCNC: 34.8 G/DL (ref 31.4–37.4)
MCV RBC AUTO: 85 FL (ref 82–98)
MICROALBUMIN UR-MCNC: 116 MG/L (ref 0–20)
MICROALBUMIN/CREAT 24H UR: 41 MG/G CREATININE (ref 0–30)
MONOCYTES # BLD AUTO: 1.13 THOUSAND/ΜL (ref 0.17–1.22)
MONOCYTES NFR BLD AUTO: 9 % (ref 4–12)
NEUTROPHILS # BLD AUTO: 9.54 THOUSANDS/ΜL (ref 1.85–7.62)
NEUTS SEG NFR BLD AUTO: 78 % (ref 43–75)
NITRITE UR QL STRIP: NEGATIVE
NON-SQ EPI CELLS URNS QL MICRO: ABNORMAL /HPF
NRBC BLD AUTO-RTO: 0 /100 WBCS
OSMOLALITY UR/SERPL-RTO: 304 MMOL/KG (ref 282–298)
OSMOLALITY UR: 360 MMOL/KG
OSMOLALITY UR: 479 MMOL/KG
PH UR STRIP.AUTO: 5 [PH]
PLATELET # BLD AUTO: 407 THOUSANDS/UL (ref 149–390)
PMV BLD AUTO: 9.9 FL (ref 8.9–12.7)
POTASSIUM SERPL-SCNC: 3.3 MMOL/L (ref 3.5–5.3)
POTASSIUM SERPL-SCNC: 3.7 MMOL/L (ref 3.5–5.3)
POTASSIUM SERPL-SCNC: 3.8 MMOL/L (ref 3.5–5.3)
PROCALCITONIN SERPL-MCNC: 1.08 NG/ML
PROT SERPL-MCNC: 9.9 G/DL (ref 6.4–8.4)
PROT UR STRIP-MCNC: ABNORMAL MG/DL
PROT UR-MCNC: 58 MG/DL
PROT/CREAT UR: 0.21 MG/G{CREAT} (ref 0–0.1)
RBC # BLD AUTO: 6.52 MILLION/UL (ref 3.88–5.62)
RBC #/AREA URNS AUTO: ABNORMAL /HPF
SODIUM 24H UR-SCNC: <5 MOL/L
SODIUM 24H UR-SCNC: <5 MOL/L
SODIUM SERPL-SCNC: 124 MMOL/L (ref 135–147)
SODIUM SERPL-SCNC: 128 MMOL/L (ref 135–147)
SODIUM SERPL-SCNC: 128 MMOL/L (ref 135–147)
SP GR UR STRIP.AUTO: >=1.03 (ref 1–1.03)
TSH SERPL DL<=0.05 MIU/L-ACNC: 1.34 UIU/ML (ref 0.45–5.33)
URATE CRY URNS QL MICRO: ABNORMAL /HPF
UROBILINOGEN UR QL STRIP.AUTO: 0.2 E.U./DL
WBC # BLD AUTO: 12.2 THOUSAND/UL (ref 4.31–10.16)
WBC #/AREA URNS AUTO: ABNORMAL /HPF

## 2022-04-01 PROCEDURE — 84156 ASSAY OF PROTEIN URINE: CPT | Performed by: INTERNAL MEDICINE

## 2022-04-01 PROCEDURE — 83605 ASSAY OF LACTIC ACID: CPT | Performed by: EMERGENCY MEDICINE

## 2022-04-01 PROCEDURE — 99285 EMERGENCY DEPT VISIT HI MDM: CPT | Performed by: EMERGENCY MEDICINE

## 2022-04-01 PROCEDURE — 93005 ELECTROCARDIOGRAM TRACING: CPT

## 2022-04-01 PROCEDURE — 84300 ASSAY OF URINE SODIUM: CPT | Performed by: INTERNAL MEDICINE

## 2022-04-01 PROCEDURE — 81001 URINALYSIS AUTO W/SCOPE: CPT | Performed by: EMERGENCY MEDICINE

## 2022-04-01 PROCEDURE — 83735 ASSAY OF MAGNESIUM: CPT | Performed by: INTERNAL MEDICINE

## 2022-04-01 PROCEDURE — 84300 ASSAY OF URINE SODIUM: CPT | Performed by: PHYSICIAN ASSISTANT

## 2022-04-01 PROCEDURE — 96374 THER/PROPH/DIAG INJ IV PUSH: CPT

## 2022-04-01 PROCEDURE — 83690 ASSAY OF LIPASE: CPT | Performed by: EMERGENCY MEDICINE

## 2022-04-01 PROCEDURE — 82570 ASSAY OF URINE CREATININE: CPT | Performed by: PHYSICIAN ASSISTANT

## 2022-04-01 PROCEDURE — 83935 ASSAY OF URINE OSMOLALITY: CPT | Performed by: PHYSICIAN ASSISTANT

## 2022-04-01 PROCEDURE — 81003 URINALYSIS AUTO W/O SCOPE: CPT | Performed by: EMERGENCY MEDICINE

## 2022-04-01 PROCEDURE — 83930 ASSAY OF BLOOD OSMOLALITY: CPT | Performed by: INTERNAL MEDICINE

## 2022-04-01 PROCEDURE — 99223 1ST HOSP IP/OBS HIGH 75: CPT | Performed by: INTERNAL MEDICINE

## 2022-04-01 PROCEDURE — 74176 CT ABD & PELVIS W/O CONTRAST: CPT

## 2022-04-01 PROCEDURE — 83935 ASSAY OF URINE OSMOLALITY: CPT | Performed by: INTERNAL MEDICINE

## 2022-04-01 PROCEDURE — 82570 ASSAY OF URINE CREATININE: CPT | Performed by: INTERNAL MEDICINE

## 2022-04-01 PROCEDURE — 99285 EMERGENCY DEPT VISIT HI MDM: CPT

## 2022-04-01 PROCEDURE — 84145 PROCALCITONIN (PCT): CPT | Performed by: PHYSICIAN ASSISTANT

## 2022-04-01 PROCEDURE — 96361 HYDRATE IV INFUSION ADD-ON: CPT

## 2022-04-01 PROCEDURE — 36415 COLL VENOUS BLD VENIPUNCTURE: CPT | Performed by: EMERGENCY MEDICINE

## 2022-04-01 PROCEDURE — 84443 ASSAY THYROID STIM HORMONE: CPT | Performed by: INTERNAL MEDICINE

## 2022-04-01 PROCEDURE — 80048 BASIC METABOLIC PNL TOTAL CA: CPT | Performed by: PHYSICIAN ASSISTANT

## 2022-04-01 PROCEDURE — 99283 EMERGENCY DEPT VISIT LOW MDM: CPT | Performed by: SPECIALIST

## 2022-04-01 PROCEDURE — 85025 COMPLETE CBC W/AUTO DIFF WBC: CPT | Performed by: EMERGENCY MEDICINE

## 2022-04-01 PROCEDURE — 80053 COMPREHEN METABOLIC PANEL: CPT | Performed by: EMERGENCY MEDICINE

## 2022-04-01 PROCEDURE — 82043 UR ALBUMIN QUANTITATIVE: CPT | Performed by: INTERNAL MEDICINE

## 2022-04-01 RX ORDER — SODIUM CHLORIDE 9 MG/ML
100 INJECTION, SOLUTION INTRAVENOUS CONTINUOUS
Status: DISCONTINUED | OUTPATIENT
Start: 2022-04-02 | End: 2022-04-04 | Stop reason: HOSPADM

## 2022-04-01 RX ORDER — POTASSIUM CHLORIDE 20 MEQ/1
40 TABLET, EXTENDED RELEASE ORAL ONCE
Status: COMPLETED | OUTPATIENT
Start: 2022-04-01 | End: 2022-04-01

## 2022-04-01 RX ORDER — SODIUM CHLORIDE 9 MG/ML
100 INJECTION, SOLUTION INTRAVENOUS CONTINUOUS
Status: DISCONTINUED | OUTPATIENT
Start: 2022-04-01 | End: 2022-04-01

## 2022-04-01 RX ORDER — ONDANSETRON 2 MG/ML
4 INJECTION INTRAMUSCULAR; INTRAVENOUS ONCE
Status: COMPLETED | OUTPATIENT
Start: 2022-04-01 | End: 2022-04-01

## 2022-04-01 RX ORDER — SODIUM CHLORIDE AND POTASSIUM CHLORIDE .9; .15 G/100ML; G/100ML
100 SOLUTION INTRAVENOUS ONCE
Status: COMPLETED | OUTPATIENT
Start: 2022-04-01 | End: 2022-04-01

## 2022-04-01 RX ORDER — SODIUM CHLORIDE, SODIUM GLUCONATE, SODIUM ACETATE, POTASSIUM CHLORIDE, MAGNESIUM CHLORIDE, SODIUM PHOSPHATE, DIBASIC, AND POTASSIUM PHOSPHATE .53; .5; .37; .037; .03; .012; .00082 G/100ML; G/100ML; G/100ML; G/100ML; G/100ML; G/100ML; G/100ML
125 INJECTION, SOLUTION INTRAVENOUS CONTINUOUS
Status: DISCONTINUED | OUTPATIENT
Start: 2022-04-01 | End: 2022-04-01

## 2022-04-01 RX ORDER — ACETAMINOPHEN 325 MG/1
650 TABLET ORAL EVERY 4 HOURS PRN
Status: DISCONTINUED | OUTPATIENT
Start: 2022-04-01 | End: 2022-04-04 | Stop reason: HOSPADM

## 2022-04-01 RX ORDER — HEPARIN SODIUM 5000 [USP'U]/ML
5000 INJECTION, SOLUTION INTRAVENOUS; SUBCUTANEOUS EVERY 8 HOURS SCHEDULED
Status: DISCONTINUED | OUTPATIENT
Start: 2022-04-01 | End: 2022-04-04 | Stop reason: HOSPADM

## 2022-04-01 RX ORDER — ONDANSETRON 2 MG/ML
4 INJECTION INTRAMUSCULAR; INTRAVENOUS EVERY 6 HOURS PRN
Status: DISCONTINUED | OUTPATIENT
Start: 2022-04-01 | End: 2022-04-04 | Stop reason: HOSPADM

## 2022-04-01 RX ADMIN — ONDANSETRON 4 MG: 2 INJECTION INTRAMUSCULAR; INTRAVENOUS at 01:39

## 2022-04-01 RX ADMIN — SODIUM CHLORIDE, SODIUM GLUCONATE, SODIUM ACETATE, POTASSIUM CHLORIDE, MAGNESIUM CHLORIDE, SODIUM PHOSPHATE, DIBASIC, AND POTASSIUM PHOSPHATE 125 ML/HR: .53; .5; .37; .037; .03; .012; .00082 INJECTION, SOLUTION INTRAVENOUS at 10:58

## 2022-04-01 RX ADMIN — POTASSIUM CHLORIDE 40 MEQ: 1500 TABLET, EXTENDED RELEASE ORAL at 10:57

## 2022-04-01 RX ADMIN — HEPARIN SODIUM 5000 UNITS: 5000 INJECTION INTRAVENOUS; SUBCUTANEOUS at 13:13

## 2022-04-01 RX ADMIN — SODIUM CHLORIDE 1000 ML: 0.9 INJECTION, SOLUTION INTRAVENOUS at 01:39

## 2022-04-01 RX ADMIN — HEPARIN SODIUM 5000 UNITS: 5000 INJECTION INTRAVENOUS; SUBCUTANEOUS at 05:46

## 2022-04-01 RX ADMIN — HEPARIN SODIUM 5000 UNITS: 5000 INJECTION INTRAVENOUS; SUBCUTANEOUS at 22:12

## 2022-04-01 RX ADMIN — SODIUM BICARBONATE 250 ML/HR: 84 INJECTION, SOLUTION INTRAVENOUS at 04:22

## 2022-04-01 RX ADMIN — SODIUM CHLORIDE AND POTASSIUM CHLORIDE 100 ML/HR: .9; .15 SOLUTION INTRAVENOUS at 16:31

## 2022-04-01 NOTE — LETTER
[address] [address]  Dept: [phone]    April 4, 2022     Patient: Shubham Cortes   YOB: 1970   Date of Visit: 4/1/2022       To Whom it May Concern:    Lon Stiles is under my professional care  He was seen in the hospital from 4/1/2022   to 04/04/22  He may return to work on 04/06/2022 without limitations  If you have any questions or concerns, please don't hesitate to call           Sincerely,          Naomy Forte MD

## 2022-04-01 NOTE — H&P
[address]  H&P- Bibiana Lynch [birth year], 46 y o  male MRN: [id number]  Unit/Bed#: ED 06 Encounter: [id number]  Primary Care Provider: Matthew Clemente DO   Date and time admitted to hospital: 4/1/2022  1:14 AM    * Acute kidney injury (nontraumatic) ([facility])  Assessment & Plan  · Creatinine was 1 68 on 03/29 currently 5 20 - rise likely secondary to increased colostomy output  · IV fluids  · Nephrology consult - discussed IVF w/ Dr Marcin Blakely Sodium bicarb 150meq in d5  At 250ml for 1 L then Isolyte  · Check urine studies  · Check PVR - 55    Ileus ([address] )  Assessment & Plan  · NPO  · Surgery Consult  · CT:  Moderately dilated loops of small bowel slightly decreased compared to prior study  Differential considerations include ileus versus small bowel obstruction  Recommend small bowel follow-through for further evaluation  Leukocytosis  Assessment & Plan  · Check Procalcitonin      Hyponatremia  Assessment & Plan  · Likely hypovolemia related  · IVF  · Check urine studies  · Nephrology consult    Morbid obesity with BMI of 40 0-44 9, adult (HCC)  Assessment & Plan  · BMI greater than 40  · Healthy diet recommended    VTE Pharmacologic Prophylaxis: VTE Score: 3 Moderate Risk (Score 3-4) - Pharmacological DVT Prophylaxis Ordered: heparin  Code Status: Level 1 - Full Code   Discussion with patient and wife at bedside    Anticipated Length of Stay: Patient will be admitted on an inpatient basis with an anticipated length of stay of greater than 2 midnights secondary to IVF, lab monitoring, specialist input  Chief Complaint: cramping in abdomen and legs    History of Present Illness:  Bibiana Lynch is a 46 y o  male with a PMH of ulcerative colitis with ostomy, morbid obesity, recently hospitalized for dehydration hyponatremia with sepsis and acute renal failure who presents with suspected dehydration    Patient was recently hospitalized 3/27-3/31 secondary to vomiting, he was seen by Gastroenterology was felt to be viral related he was stabilized and discharged home  Wednesday he still had increased watery output from his colostomy  Thursday improved slightly  Now he feels that his stool is getting back to baseline  ED treatment: 1 L IVF  Post void residual  was 55  Review of Systems:  Review of Systems   Constitutional: Positive for fatigue  Negative for chills and fever  HENT: Negative for rhinorrhea, sore throat and trouble swallowing  Eyes: Negative for discharge and redness  Respiratory: Negative for cough and shortness of breath  Cardiovascular: Negative for chest pain and leg swelling  Gastrointestinal: Negative for abdominal pain, diarrhea and vomiting  Genitourinary: Negative for dysuria and hematuria  Musculoskeletal: Negative for back pain and neck pain  Skin: Negative for rash and wound  Neurological: Negative for dizziness, weakness and headaches  Psychiatric/Behavioral: Negative for agitation and confusion  Past Medical and Surgical History:   Past Medical History:   Diagnosis Date    Ulcerative colitis [facility])        Past Surgical History:   Procedure Laterality Date    COLOSTOMY  2005       Meds/Allergies:  Prior to Admission medications    Medication Sig Start Date End Date Taking? 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  Patient Pre-hospital Living Situation: Home  Patient Pre-hospital Level of Mobility: walks  Patient Pre-hospital Diet Restrictions: none  Substance Use History:   Social History     Substance and Sexual Activity   Alcohol Use Yes    Comment: social Social History     Tobacco Use   Smoking Status Never Smoker   Smokeless Tobacco Never Used     Social History     Substance and Sexual Activity   Drug Use Never       Family History:  Family History   Problem Relation Age of Onset    Alcohol abuse Mother     Alzheimer's disease Father        Physical Exam:     Vitals:   Blood Pressure: 131/78 (04/01/22 0315)  Pulse: 87 (04/01/22 0315)  Temperature: (!) 97 °F (36 1 °C) (04/01/22 0103)  Temp Source: Temporal (04/01/22 0103)  Respirations: 19 (04/01/22 0315)  Height: 5' 10" (177 8 cm) (04/01/22 0103)  Weight - Scale: 136 kg (300 lb) (04/01/22 0103)  SpO2: 93 % (04/01/22 0315)    Physical Exam  Vitals reviewed  Constitutional:       General: He is not in acute distress  Appearance: Normal appearance  He is morbidly obese  Comments: Middle age [de-identified] male   HENT:      Head: Normocephalic and atraumatic  Right Ear: External ear normal       Left Ear: External ear normal       Nose: Nose normal    Eyes:      General:         Right eye: No discharge  Left eye: No discharge  Conjunctiva/sclera: Conjunctivae normal    Cardiovascular:      Rate and Rhythm: Normal rate and regular rhythm  Heart sounds: Normal heart sounds  No murmur heard  Pulmonary:      Effort: Pulmonary effort is normal  No respiratory distress  Breath sounds: Normal breath sounds  No wheezing or rales  Abdominal:      General: Bowel sounds are decreased  There is no distension  Palpations: Abdomen is soft  Tenderness: There is no abdominal tenderness  There is no guarding  Comments: Colostomy in place right lower quad   Musculoskeletal:         General: Normal range of motion  Cervical back: Normal range of motion  Skin:     General: Skin is warm and dry  Neurological:      Mental Status: He is alert and oriented to person, place, and time  Mental status is at baseline     Psychiatric:         Mood and Affect: Mood normal  Behavior: Behavior normal          Thought Content: Thought content normal          Judgment: Judgment normal           Additional Data:     Lab Results:  Results from last 7 days   Lab Units 04/01/22  0139   WBC Thousand/uL 12 20*   HEMOGLOBIN g/dL 19 4*   HEMATOCRIT % 55 7*   PLATELETS Thousands/uL 407*   NEUTROS PCT % 78*   LYMPHS PCT % 11*   MONOS PCT % 9   EOS PCT % 0     Results from last 7 days   Lab Units 04/01/22  0139   SODIUM mmol/L 124*   POTASSIUM mmol/L 3 7   CHLORIDE mmol/L 88*   CO2 mmol/L 15*   BUN mg/dL 60*   CREATININE mg/dL 5 20*   ANION GAP mmol/L 21*   CALCIUM mg/dL 10 8*   ALBUMIN g/dL 5 4*   TOTAL BILIRUBIN mg/dL 0 66   ALK PHOS U/L 122*   ALT U/L 49   AST U/L 37   GLUCOSE RANDOM mg/dL 155*     Results from last 7 days   Lab Units 03/27/22  0448   INR  1 08             Results from last 7 days   Lab Units 04/01/22  0139 03/27/22  0553 03/27/22  0448   LACTIC ACID mmol/L 1 9 2 0  --    PROCALCITONIN ng/ml  --   --  0 67*       Imaging: Reviewed radiology reports from this admission including: abdominal/pelvic CT  CT abdomen pelvis wo contrast   Final Result by Sonia Pedraza DO (04/01 3231)      Status post colectomy with right lower quadrant ileostomy  Moderately dilated loops of small bowel slightly decreased compared to prior study  Differential considerations include ileus versus small bowel obstruction  Recommend small bowel follow-through for further evaluation  Tiger text message was sent to Isabel Graves on 4/1/2022 at 3:04 AM                      Workstation performed: [id number]              ** Please Note: This note has been constructed using a voice recognition system   **

## 2022-04-01 NOTE — CONSULTS
Consultation - Nephrology   Nicole Tracy 46 y o  male MRN: [id number]  Unit/Bed#: ED 06 Encounter: [id number]      A/P:  1  Acute kidney injury POA superimposed on CKD 3   - Presented with oliguria-anuria on Wednesday   - Creatinine was 5 32mg/dl    - likely due to acute tubular necrosis due to marked volume depletion from copious ileostomy outptut   - Check urine electrolytes to evaluate for ATN   - Start normal saline with 20 meq KCl /liter at 100 /hr then reduce to 75 ml/hr   - check urine electrolytes    - UA 2+ blood 4-10 RBC 2+ protein hyaline casts   - Will check complements due to hematuria   - Check PVR    2  Chronic kidney disease stage 3   - baseline creatinine 1 3-1 6 mg/dl   - Normal kidney ultrasound in Feb 2022    3  Ileu   - As per primary   - may need a SBFT to look for obstruction    4  Hyponatremia   - Hyponatremic hypovolemia   - Check urine lytes   - On no home meds       Thank you for allowing us to participate in the care of your patient  Please feel free to contact us regarding the care of this patient, or any other questions/concerns that may be applicable  Patient Active Problem List   Diagnosis    Sepsis with acute renal failure without septic shock ([facility] [address] [facility]    Intra-abdominal infection    Morbid obesity with BMI of 40 0-44 9, adult ([facility] [address] )    Acute kidney injury (nontraumatic) (HCC)    Hyponatremia    Ileus (HCC)    Leukocytosis       History of Present Illness   Physician Requesting Consult: Brian Hidden, DO  Reason for Consult / Principal Problem: acute kidney injury and hyponatremia  Hx and PE limited by:   HPI: Nicole Tracy is a 46y o  year old male who presents with a creatinine of 5 2 mg/dl and hyponatremia  He was hospitalized last  Week from Saturday to Tuesday with nausea, vomiting and gastroenteritis  He slept most of Wed after discharge and said his ileostomy was ure liquid without pain  He drank Gatorade and felt weak   He had no urine output on Wed and little on Thursday  His legs were cramping and markedly week  He was not eating much but had no fever  He presented early this morning with a creatinine of 5 32mg/dl  He has CKD 3 with a baseline creatinine of 1 3-1 6 mg/dl  He denies NSAID use, other OTC meds or FH of kidney disease    History obtained from chart review and the patient    Constitutional ROS- Has fatigue,no  fever, chills, night sweats, weight changes  HEENT ROS- Denies history of eye issues headaches  Endocrine ROS- No history diabetes mellitus or thyroid disease  Cardiovascular ROS- Denies chest pain, palpitation, dyspnea exertion, orthopnea, claudication  Pulmonary ROS- Denies history of COPD, asthma  Denies cough, hemoptysis, shortness of breath  GI ROS- Denies abdominal pain,had copious loose stools from ileostomy  , nausea, no swallowing problems, vomiting, constipation, blood in stools  Hematological ROS- Denies history of easy bruising, blood clots, bleeding or blood transfusions  Genitourinary ROS- Denies recent hematuria, pyuria, flank pain, change in urinary stream,had decreased urinary output,  Lymphatic ROS- Denies lymphadenopathy  Musculoskeletal ROS- Has muscle weakness, joint pain  Dermatological ROS- Denies rash,  Psychiatric ROS-   Neurological ROS- No stroke or TIA symptoms        Historical Information   Past Medical History:   Diagnosis Date    Ulcerative colitis ([facility] [address] )      Past Surgical History:   Procedure Laterality Date    COLOSTOMY  2005     Social History   Social History     Substance and Sexual Activity   Alcohol Use Yes    Comment: social     Social History     Substance and Sexual Activity   Drug Use Never     Social History     Tobacco Use   Smoking Status Never Smoker   Smokeless Tobacco Never Used     Family History   Problem Relation Age of Onset    Alcohol abuse Mother     Alzheimer's disease Father        Meds/Allergies   all current active meds have been reviewed, current meds:   Current Facility-Administered Medications   Medication Dose Route Frequency    acetaminophen (TYLENOL) tablet 650 mg  650 mg Oral Q4H PRN    heparin (porcine) subcutaneous injection 5,000 Units  5,000 Units Subcutaneous Q8H [facility]    multi-electrolyte (PLASMALYTE-A/ISOLYTE-S PH 7 4) IV solution  125 mL/hr Intravenous Continuous    ondansetron (ZOFRAN) injection 4 mg  4 mg Intravenous Q6H PRN    and PTA meds:  (Not in a hospital admission)        No Known Allergies    Objective     Intake/Output Summary (Last 24 hours) at 4/1/2022 1310  Last data filed at 4/1/2022 1058  Gross per 24 hour   Intake 2000 ml   Output --   Net 2000 ml       Invasive Devices:        Physical Exam      I/O last 3 completed shifts: In: 1000 [IV Piggyback:1000]  Out: -     Vitals:    04/01/22 1300   BP: 116/82   Pulse: 86   Resp: 18   Temp:    SpO2: 91%       General Appearance:    No acute distress  Cooperative  Appears stated age  Head:    Normocephalic  Atraumatic  Normal jaw occlusion  Eyes:    Lids, conjunctiva normal  No scleral icterus  Ears:    Normal external ears  Nose:   Nares normal  No drainage  Mouth:   Lips, tongue normal  Mucosa normal  Phonation normal    Neck:   Supple  Symmetrical    Back:     Symmetric  No CVA tenderness  Lungs:     Normal respiratory effort  Clear to auscultation bilaterally  Chest wall:    No tenderness or deformity  Heart:    Regular rate and rhythm  Normal S1 and S2  No murmur  No JVD  No edema  Abdomen:     Soft  Non-tender  Bowel sounds active  ileostomy contains more formed stool   Genitourinary:   No Austin catheter present  Extremities:   Extremities normal  Atraumatic  No cyanosis  Skin:   Warm and dry  No pallor, jaundice, rash, ecchymoses  Neurologic:   Alert and oriented to person, place, time  No focal deficit           Current Weight: Weight - Scale: 136 kg (300 lb)  First Weight: Weight - Scale: 136 kg (300 lb)    Lab Results:  I have personally reviewed pertinent labs     CBC:   Lab Results   Component Value Date    WBC 12 20 (H) 04/01/2022    HGB 19 4 (H) 04/01/2022    HCT 55 7 (H) 04/01/2022    MCV 85 04/01/2022     (H) 04/01/2022    MCH 29 8 04/01/2022    MCHC 34 8 04/01/2022    RDW 12 7 04/01/2022    MPV 9 9 04/01/2022    NRBC 0 04/01/2022     CMP:   Lab Results   Component Value Date    K 3 3 (L) 04/01/2022    CL 89 (L) 04/01/2022    CO2 23 04/01/2022    BUN 63 (H) 04/01/2022    CREATININE 4 34 (H) 04/01/2022    CALCIUM 9 5 04/01/2022    AST 37 04/01/2022    ALT 49 04/01/2022    ALKPHOS 122 (H) 04/01/2022    EGFR 14 04/01/2022     Phosphorus: No results found for: PHOS  Magnesium: No results found for: MG  Urinalysis:   Lab Results   Component Value Date    COLORU Yellow 04/01/2022    CLARITYU Clear 04/01/2022    SPECGRAV >=1 030 (H) 04/01/2022    PHUR 5 0 04/01/2022    LEUKOCYTESUR Negative 04/01/2022    NITRITE Negative 04/01/2022    GLUCOSEU Negative 04/01/2022    KETONESU Trace (A) 04/01/2022    BILIRUBINUR 1+ (A) 04/01/2022    BLOODU 2+ (A) 04/01/2022     Ionized Calcium: No results found for: CAION  Coagulation: No results found for: PT, INR, APTT  Troponin: No results found for: TROPONINI  ABG: No results found for: PHART, ATW6BSD, PO2ART, CHH8XHK, I9LHXMAF, BEART, SOURCE    Results from last 7 days   Lab Units 04/01/22  0956 04/01/22  0139 03/29/22  0605 03/28/22  0453 03/28/22  0453 03/27/22  0448 03/27/22  0448   POTASSIUM mmol/L 3 3* 3 7 4 1   < > 4 2   < > 4 6   CHLORIDE mmol/L 89* 88* 101   < > 102   < > 99   CO2 mmol/L 23 15* 22   < > 22   < > 21   BUN mg/dL 63* 60* 23   < > 30*   < > 27*   CREATININE mg/dL 4 34* 5 20* 1 68*   < > 1 89*   < > 2 14*   CALCIUM mg/dL 9 5 10 8* 9 8   < > 9 8   < > 10 8*   ALK PHOS U/L  --  122*  --   --  81  --  100   ALT U/L  --  49  --   --  28  --  22   AST U/L  --  37  --   --  26  --  22    < > = values in this interval not displayed         Radiology review:  Procedure: CT abdomen pelvis wo contrast    Result Date: 4/1/2022  Narrative: CT ABDOMEN AND PELVIS WITHOUT IV CONTRAST INDICATION:   recent bowel obstruction, cholelithiasis  COMPARISON:  March 27, 2023 TECHNIQUE:  CT examination of the abdomen and pelvis was performed without intravenous contrast   Axial, sagittal, and coronal 2D reformatted images were created from the source data and submitted for interpretation  Radiation dose length product (DLP) for this visit:  1629 79 mGy-cm   This examination, like all CT scans performed in the [facility], was performed utilizing techniques to minimize radiation dose exposure, including the use of iterative reconstruction and automated exposure control  Enteric contrast was not administered  FINDINGS: Study is limited due to lack of intravenous and oral contrast to evaluate for abdominal organs and vascular structures ABDOMEN LOWER CHEST:  No clinically significant abnormality identified in the visualized lower chest  LIVER/BILIARY TREE:  Unremarkable  GALLBLADDER:  There are gallstone(s) within the gallbladder, without pericholecystic inflammatory changes  SPLEEN:  Unremarkable  PANCREAS:  Unremarkable  ADRENAL GLANDS:  Unremarkable  KIDNEYS/URETERS:  Left-sided renal atrophy is seen  Unchanged stone in the left renal pelvis is seen  No evidence of hydronephrosis  STOMACH AND BOWEL:  Status post total colectomy  Right lower quadrant ileostomy is seen  Fluid-filled loops of small bowel measuring up to 4 cm are visualized  APPENDIX:  Absent ABDOMINOPELVIC CAVITY:  No ascites  No pneumoperitoneum  No lymphadenopathy  VESSELS:  Atherosclerotic changes are present  No evidence of aneurysm  PELVIS REPRODUCTIVE ORGANS:  Unremarkable for patient's age  URINARY BLADDER:  Unremarkable  ABDOMINAL WALL/INGUINAL REGIONS:  Bilateral small fat-containing inguinal hernias are seen    Right lower quadrant ostomy with parastomal hernia containing fat and loops of small bowel OSSEOUS STRUCTURES:  No acute fracture or destructive osseous lesion  Spinal degenerative changes are noted  Impression: Status post colectomy with right lower quadrant ileostomy  Moderately dilated loops of small bowel slightly decreased compared to prior study  Differential considerations include ileus versus small bowel obstruction  Recommend small bowel follow-through for further evaluation  Tiger text message was sent to Shirin Hopkinss on 4/1/2022 at 3:04 AM  Workstation performed: [id number]         EKG, Pathology, and Other Studies: I have reviewed labs and xrays and prior notes      Counseling / Coordination of Care  Total ADDITIONAL floor / unit time spent today 40 minutes  Greater than 50% of total time was spent with the patient and / or family counseling and / or coordination of care  A description of the counseling / coordination of care: follows    Julita Ervin MD      This consultation note was produced in part using a dictation device which may document imprecise wording from author's original intent

## 2022-04-01 NOTE — ASSESSMENT & PLAN NOTE
· NPO  · Surgery Consult  · CT:  Moderately dilated loops of small bowel slightly decreased compared to prior study  Differential considerations include ileus versus small bowel obstruction  Recommend small bowel follow-through for further evaluation

## 2022-04-01 NOTE — CONSULTS
Consultation - General Surgery   Severiano Earnest 46 y o  male MRN: [id number]  Unit/Bed#: ED 06 Encounter: [id number]    Reason for Consult: Ileus   Attending Physician: Jayjay Zaidi DO      Assessment:  46 M PMH UC s/p total colectomy with end ileostomy 15 years ago who was recently admitted 3/27-3/29 for N/V/D from what was suspected to be viral gastroenteritis  He returns for signs and symptoms of dehydration, found to have worsening JENAE (5 20) and hyponatremia (124)  General surgery is consulted for evaluation of ileus noted on CT scan  Afebrile, VSS  LA normal 1 9  Leukocytosis  12 20  Procal elevated 1 08  Signs of dehydration: Hgb 19 4, Na 124, Cr 5 20    CTAP showing mod dilated SB loops consist with ileus vs SBO, but distension is decreased from prev imaging on 3/27    Abdomen soft, nontender, nondistended, hypoactive BS  Parastomal hernia b/l inguinal hernias soft, non-tender, no signs of strangulation   + gas and thin pastey green ileostomy output in bag, stoma pink/red, moist       Plan:  · No plans for surgical intervention, continue supportive care   · Passing stool/gas, no abdominal pain N/V, ok to advance diet as tolerated  Taking po diet will help with dehydration status as well  · Continue to monitor output, I/Os  · Monitor electrolytes and replete as needed   · Serial abdominal exams  · Consider GI consult to if continues with elevated ostomy output   · Continue weight loss plan  Follow up with [facility] general surgeon, Dr Gerard Valentine, as needed to discuss elective repair of parastomal hernia     · Will discuss with surgical attending   · Rest of medical management per primary       Subjective     Chief Complaint: Feeling dehydrated, "not keeping up with ostomy output", muscle cramping    History of Present Illness   HPI:  Severiano Earnest is a 46 y o  male with PMH UC s/p total colectomy with end ileostomy 15 years ago at [facility] who was recently admitted 3/27-3/29 for N/V/D from what was suspected to be viral gastroenteritis  Per chart review, he met sepsis criteria, was started on IV Zosyn, GI saw him and all stool studies came back negative so abx were d/c  Also found to have hyponatremia and JENAE 2/2 dehydration at that time  He returns last night at 3 am w/ complaint of "feeling like I was dehydrate and not keeping up with oral intake even though I was drinking lots of gatorade and water " Admits to abdominal cramping with movement, likely muscular in nature, as well as BLE muscle cramping and "feeling of heaviness of the legs " He felt very fatigued  Denies dizziness, light-headness, syncope  Overall he did have an appetite, no N/V, and was eating well during this time, no more abdominal pain  He felt his ostomy output was thinner consistency and increased during his "stomach bug" but by Thursday had appeared back to normal  Notes liquid output and gas in the bag  Review of Systems   Constitutional: Positive for fatigue  Negative for activity change, appetite change, chills and fever  HENT: Negative  Respiratory: Negative for cough and shortness of breath  Cardiovascular: Negative for chest pain and palpitations  Gastrointestinal: Positive for abdominal pain ("cramping" with movement)  Negative for abdominal distention, blood in stool, constipation, diarrhea, nausea and vomiting  Endocrine: Negative  Genitourinary: Negative  Musculoskeletal: Negative  Skin: Negative  Neurological: Negative for dizziness, syncope and light-headedness  Psychiatric/Behavioral: Negative          Historical Information   Past Medical History:   Diagnosis Date    Ulcerative colitis ([facility] [address] )      Past Surgical History:   Procedure Laterality Date    COLOSTOMY  2005     Social History   Social History     Substance and Sexual Activity   Alcohol Use Yes    Comment: social     Social History     Substance and Sexual Activity   Drug Use Never     Social History     Tobacco Use   Smoking Status Never Smoker   Smokeless Tobacco Never Used     Family History:   Family History   Problem Relation Age of Onset    Alcohol abuse Mother     Alzheimer's disease Father        Meds/Allergies    No home medications  all medications and allergies reviewed  No Known Allergies        Objective   First Vitals:   Blood Pressure: 119/87 (04/01/22 0109)  Pulse: 99 (04/01/22 0103)  Temperature: (!) 97 °F (36 1 °C) (04/01/22 0103)  Temp Source: Temporal (04/01/22 0103)  Respirations: 18 (04/01/22 0103)  Height: 5' 10" (177 8 cm) (04/01/22 0103)  Weight - Scale: 136 kg (300 lb) (04/01/22 0103)  SpO2: 97 % (04/01/22 0103) Body mass index is 43 05 kg/m²  Current Vitals:   Blood Pressure: 133/90 (04/01/22 0749)  Pulse: 87 (04/01/22 0749)  Temperature: (!) 97 °F (36 1 °C) (04/01/22 0103)  Temp Source: Temporal (04/01/22 0103)  Respirations: 18 (04/01/22 0749)  Height: 5' 10" (177 8 cm) (04/01/22 0103)  Weight - Scale: 136 kg (300 lb) (04/01/22 0103)  SpO2: 96 % (04/01/22 0749)   I/Os:    Intake/Output Summary (Last 24 hours) at 4/1/2022 0833  Last data filed at 4/1/2022 0239  Gross per 24 hour   Intake 1000 ml   Output --   Net 1000 ml     Lines/Drains:  Invasive Devices  Report    Peripheral Intravenous Line            Peripheral IV 04/01/22 Right Antecubital <1 day          Drain            Colostomy RLQ -- days                Physical Exam  Vitals reviewed  Constitutional:       General: He is not in acute distress  HENT:      Head: Normocephalic and atraumatic  Eyes:      Extraocular Movements: Extraocular movements intact  Conjunctiva/sclera: Conjunctivae normal    Cardiovascular:      Rate and Rhythm: Normal rate and regular rhythm  Heart sounds: No murmur heard  Pulmonary:      Effort: Pulmonary effort is normal       Breath sounds: No wheezing, rhonchi or rales  Abdominal:      Palpations: Abdomen is soft  Tenderness: There is no abdominal tenderness  There is no guarding or rebound  Hernia: A hernia (Parastomal hernia, soft  Bilateral soft inguinal hernias  ) is present  Comments: Midline laparotomy scar from total colectomy   RLQ ileostomy pink, moist, with green thin pastey output  Hypoactive BS   Musculoskeletal:         General: No tenderness  Cervical back: Neck supple  Right lower leg: No edema  Left lower leg: No edema  Skin:     General: Skin is warm and dry  Neurological:      General: No focal deficit present  Mental Status: He is alert and oriented to person, place, and time  Lab Results: I have personally reviewed pertinent lab results      Recent Results (from the past 36 hour(s))   CBC and differential    Collection Time: 04/01/22  1:39 AM   Result Value Ref Range    WBC 12 20 (H) 4 31 - 10 16 Thousand/uL    RBC 6 52 (H) 3 88 - 5 62 Million/uL    Hemoglobin 19 4 (H) 12 0 - 17 0 g/dL    Hematocrit 55 7 (H) 36 5 - 49 3 %    MCV 85 82 - 98 fL    MCH 29 8 26 8 - 34 3 pg    MCHC 34 8 31 4 - 37 4 g/dL    RDW 12 7 11 6 - 15 1 %    MPV 9 9 8 9 - 12 7 fL    Platelets 188 (H) 763 - 390 Thousands/uL    nRBC 0 /100 WBCs    Neutrophils Relative 78 (H) 43 - 75 %    Immat GRANS % 1 0 - 2 %    Lymphocytes Relative 11 (L) 14 - 44 %    Monocytes Relative 9 4 - 12 %    Eosinophils Relative 0 0 - 6 %    Basophils Relative 1 0 - 1 %    Neutrophils Absolute 9 54 (H) 1 85 - 7 62 Thousands/µL    Immature Grans Absolute 0 13 0 00 - 0 20 Thousand/uL    Lymphocytes Absolute 1 29 0 60 - 4 47 Thousands/µL    Monocytes Absolute 1 13 0 17 - 1 22 Thousand/µL    Eosinophils Absolute 0 03 0 00 - 0 61 Thousand/µL    Basophils Absolute 0 08 0 00 - 0 10 Thousands/µL   CMP    Collection Time: 04/01/22  1:39 AM   Result Value Ref Range    Sodium 124 (L) 135 - 147 mmol/L    Potassium 3 7 3 5 - 5 3 mmol/L    Chloride 88 (L) 96 - 108 mmol/L    CO2 15 (L) 21 - 32 mmol/L    ANION GAP 21 (H) 4 - 13 mmol/L    BUN 60 (H) 5 - 25 mg/dL    Creatinine 5 20 (H) 0 60 - 1 30 mg/dL    Glucose 155 (H) 65 - 140 mg/dL    Calcium 10 8 (H) 8 4 - 10 2 mg/dL    AST 37 13 - 39 U/L    ALT 49 7 - 52 U/L    Alkaline Phosphatase 122 (H) 34 - 104 U/L    Total Protein 9 9 (H) 6 4 - 8 4 g/dL    Albumin 5 4 (H) 3 5 - 5 0 g/dL    Total Bilirubin 0 66 0 20 - 1 00 mg/dL    eGFR 11 ml/min/1 73sq m   Lipase    Collection Time: 04/01/22  1:39 AM   Result Value Ref Range    Lipase 105 (H) 11 - 82 u/L   Lactic acid, plasma    Collection Time: 04/01/22  1:39 AM   Result Value Ref Range    LACTIC ACID 1 9 0 5 - 2 0 mmol/L   Procalcitonin    Collection Time: 04/01/22  1:39 AM   Result Value Ref Range    Procalcitonin 1 08 (H) <=0 25 ng/ml   UA w Reflex to Microscopic w Reflex to Culture    Collection Time: 04/01/22  2:49 AM    Specimen: Urine, Clean Catch   Result Value Ref Range    Color, UA Yellow Yellow, Straw    Clarity, UA Clear Hazy, Clear    Specific Gravity, UA >=1 030 (H) >1 005-<1 030    pH, UA 5 0 5 0, 5 5, 6 0, 6 5, 7 0, 7 5    Leukocytes, UA Negative Negative    Nitrite, UA Negative Negative    Protein, UA 2+ (A) Negative, Interference- unable to analyze mg/dl    Glucose, UA Negative Negative mg/dl    Ketones, UA Trace (A) Negative mg/dl    Urobilinogen, UA 0 2 0 2, 1 0 E U /dl E U /dl    Bilirubin, UA 1+ (A) Negative    Blood, UA 2+ (A) Negative   Urine Microscopic    Collection Time: 04/01/22  2:49 AM   Result Value Ref Range    RBC, UA 4-10 (A) None Seen, 0-1, 1-2, 2-4, 0-5 /hpf    WBC, UA 1-2 None Seen, 0-1, 1-2, 0-5, 2-4 /hpf    Epithelial Cells Occasional None Seen, Occasional /hpf    Bacteria, UA Moderate (A) None Seen, Occasional /hpf    Hyaline Casts, UA 2-4 (A) (none) /lpf    Uric Acid Edith, UA Moderate /hpf     Imaging: I have personally reviewed pertinent reports  CT abdomen pelvis wo contrast    Result Date: 4/1/2022  Impression: Status post colectomy with right lower quadrant ileostomy  Moderately dilated loops of small bowel slightly decreased compared to prior study    Differential considerations include ileus versus small bowel obstruction  Recommend small bowel follow-through for further evaluation  Tiger text message was sent to Wayne Debi on 4/1/2022 at 3:04 AM  Workstation performed: [id number]     EKG, Pathology, and Other Studies: I have personally reviewed pertinent reports  VTE Prophylaxis: Heparin       Counseling / Coordination of Care  Counseling/Coordination of Care: Total floor / unit time spent today 20 minutes  Greater than 50% of total time was spent with the patient and / or family counseling and / or coordination of care  A description of the counseling / coordination of care: discussed with attending  all questions answered for the patient       DURGA Islas  4/1/2022

## 2022-04-01 NOTE — ASSESSMENT & PLAN NOTE
· Creatinine was 1 68 on 03/29 currently 5 20 - rise likely secondary to increased colostomy output  · IV fluids  · Nephrology consult - discussed IVF w/ Dr Maurizio Levi Sodium bicarb 150meq in d5  At 250ml for 1 L then Isolyte  · Check urine studies  · Check PVR - 55

## 2022-04-01 NOTE — ED PROVIDER NOTES
History  Chief Complaint   Patient presents with    Dehydration     This is a 17-year-old man  Complains of weakness malaise and fatigue  He also noted cramping earlier today  Patient was admitted to the hospital recently discharged on Tuesday  While here he had a significant YOANDY secondary to a questionable ileus and dehydration  His Yoandy I approved the felt significantly better when he was discharged  It was thought to be attributed to dehydration/prerenal   He states he has been trying to take fluids home does not feel he is able to keep up with what he needs to do  Since the discharge he has progressively worsened  Reports he is not having abdominal pain  Notes he has had and lose stool into his stoma  None       Past Medical History:   Diagnosis Date    Ulcerative colitis ([facility] [address] )        Past Surgical History:   Procedure Laterality Date    COLOSTOMY  2005       Family History   Problem Relation Age of Onset    Alcohol abuse Mother     Alzheimer's disease Father      I have reviewed and agree with the history as documented  E-Cigarette/Vaping    E-Cigarette Use Never User      E-Cigarette/Vaping Substances    Nicotine No     THC No     CBD No     Flavoring No     Other No     Unknown No      Social History     Tobacco Use    Smoking status: Never Smoker    Smokeless tobacco: Never Used   Vaping Use    Vaping Use: Never used   Substance Use Topics    Alcohol use: Yes     Comment: social    Drug use: Never       Review of Systems   Constitutional: Positive for activity change and fatigue  Negative for chills and fever  Eyes: Negative for visual disturbance  Respiratory: Negative for shortness of breath  Cardiovascular: Negative for chest pain  Gastrointestinal: Negative for abdominal distention and abdominal pain  Endocrine: Negative for polyuria  Genitourinary: Negative for decreased urine volume and dysuria  Skin: Negative for rash     Neurological: Positive for weakness  Negative for dizziness and syncope  Physical Exam  Physical Exam  Constitutional:       General: He is not in acute distress  Appearance: He is well-developed  He is not ill-appearing, toxic-appearing or diaphoretic  HENT:      Head: Normocephalic and atraumatic  Right Ear: External ear normal       Left Ear: External ear normal       Nose: Nose normal       Mouth/Throat:      Pharynx: Oropharynx is clear  Eyes:      Conjunctiva/sclera: Conjunctivae normal       Pupils: Pupils are equal, round, and reactive to light  Cardiovascular:      Rate and Rhythm: Normal rate and regular rhythm  Heart sounds: Normal heart sounds  Pulmonary:      Effort: Pulmonary effort is normal  No respiratory distress  Breath sounds: Normal breath sounds  Abdominal:      General: Bowel sounds are normal  There is no distension  Palpations: Abdomen is soft  Tenderness: There is no abdominal tenderness  There is no guarding or rebound  Comments: Ostomy appears functional in place   Musculoskeletal:         General: Normal range of motion  Cervical back: Normal range of motion and neck supple  Skin:     General: Skin is warm and dry  Neurological:      General: No focal deficit present  Mental Status: He is alert and oriented to person, place, and time  Psychiatric:         Behavior: Behavior normal          Thought Content:  Thought content normal          Judgment: Judgment normal          Vital Signs  ED Triage Vitals   Temperature Pulse Respirations Blood Pressure SpO2   04/01/22 0103 04/01/22 0103 04/01/22 0103 04/01/22 0109 04/01/22 0103   (!) 97 °F (36 1 °C) 99 18 119/87 97 %      Temp Source Heart Rate Source Patient Position - Orthostatic VS BP Location FiO2 (%)   04/01/22 0103 04/01/22 0103 -- 04/01/22 0103 --   Temporal Monitor  Left arm       Pain Score       04/01/22 0103       No Pain           Vitals:    04/01/22 0103 04/01/22 0109 04/01/22 0315   BP: 119/87 131/78   Pulse: 99  87         Visual Acuity      ED Medications  Medications   sodium bicarbonate 150 mEq in dextrose 5 % 1,000 mL infusion (250 mL/hr Intravenous New Bag 4/1/22 0422)   acetaminophen (TYLENOL) tablet 650 mg (has no administration in time range)   ondansetron (ZOFRAN) injection 4 mg (has no administration in time range)   heparin (porcine) subcutaneous injection 5,000 Units (has no administration in time range)   multi-electrolyte (PLASMALYTE-A/ISOLYTE-S PH 7 4) IV solution (has no administration in time range)   ondansetron (ZOFRAN) injection 4 mg (4 mg Intravenous Given 4/1/22 0139)   sodium chloride 0 9 % bolus 1,000 mL (0 mL Intravenous Stopped 4/1/22 0239)   sodium bicarbonate 8 4 % injection **ADS Override Pull** (  Override Pull 4/1/22 0422)       Diagnostic Studies  Results Reviewed     Procedure Component Value Units Date/Time    Procalcitonin [[id number]]     Lab Status: No result Specimen: Blood     Osmolality, urine [[id number]]     Lab Status: No result Specimen: Urine     Sodium, urine, random [[id number]]     Lab Status: No result Specimen: Urine     Creatinine, urine, random [[id number]]     Lab Status: No result Specimen: Urine     Urine Microscopic [[id number]]  (Abnormal) Collected: 04/01/22 0249    Lab Status: Final result Specimen: Urine, Clean Catch Updated: 04/01/22 0344     RBC, UA 4-10 /hpf      WBC, UA 1-2 /hpf      Epithelial Cells Occasional /hpf      Bacteria, UA Moderate /hpf      Hyaline Casts, UA 2-4 /lpf      Uric Acid Edith, UA Moderate /hpf     UA w Reflex to Microscopic w Reflex to Culture [[id number]]  (Abnormal) Collected: 04/01/22 0249    Lab Status: Final result Specimen: Urine, Clean Catch Updated: 04/01/22 0323     Color, UA Yellow     Clarity, UA Clear     Specific Gravity, UA >=1 030     pH, UA 5 0     Leukocytes, UA Negative     Nitrite, UA Negative     Protein, UA 2+ mg/dl      Glucose, UA Negative mg/dl      Ketones, UA Trace mg/dl      Urobilinogen, UA 0 2 E U /dl      Bilirubin, UA 1+     Blood, UA 2+    CMP [[id number]]  (Abnormal) Collected: 04/01/22 0139    Lab Status: Final result Specimen: Blood from Arm, Right Updated: 04/01/22 0236     Sodium 124 mmol/L      Potassium 3 7 mmol/L      Chloride 88 mmol/L      CO2 15 mmol/L      ANION GAP 21 mmol/L      BUN 60 mg/dL      Creatinine 5 20 mg/dL      Glucose 155 mg/dL      Calcium 10 8 mg/dL      AST 37 U/L      ALT 49 U/L      Alkaline Phosphatase 122 U/L      Total Protein 9 9 g/dL      Albumin 5 4 g/dL      Total Bilirubin 0 66 mg/dL      eGFR 11 ml/min/1 73sq m     Narrative:      [place] guidelines for Chronic Kidney Disease (CKD):     Stage 1 with normal or high GFR (GFR > 90 mL/min/1 73 square meters)    Stage 2 Mild CKD (GFR = 60-89 mL/min/1 73 square meters)    Stage 3A Moderate CKD (GFR = 45-59 mL/min/1 73 square meters)    Stage 3B Moderate CKD (GFR = 30-44 mL/min/1 73 square meters)    Stage 4 Severe CKD (GFR = 15-29 mL/min/1 73 square meters)    Stage 5 End Stage CKD (GFR <15 mL/min/1 73 square meters)  Note: GFR calculation is accurate only with a steady state creatinine    Lipase [[id number]]  (Abnormal) Collected: 04/01/22 0139    Lab Status: Final result Specimen: Blood from Arm, Right Updated: 04/01/22 0236     Lipase 105 u/L     Lactic acid, plasma [[id number]]  (Normal) Collected: 04/01/22 0139    Lab Status: Final result Specimen: Blood from Arm, Right Updated: 04/01/22 0218     LACTIC ACID 1 9 mmol/L     Narrative:      Result may be elevated if tourniquet was used during collection      CBC and differential [[id number]]  (Abnormal) Collected: 04/01/22 0139    Lab Status: Final result Specimen: Blood from Arm, Right Updated: 04/01/22 0207     WBC 12 20 Thousand/uL      RBC 6 52 Million/uL      Hemoglobin 19 4 g/dL      Hematocrit 55 7 %      MCV 85 fL      MCH 29 8 pg      MCHC 34 8 g/dL      RDW 12 7 %      MPV 9 9 fL      Platelets 693 Thousands/uL      nRBC 0 /100 WBCs      Neutrophils Relative 78 %      Immat GRANS % 1 %      Lymphocytes Relative 11 %      Monocytes Relative 9 %      Eosinophils Relative 0 %      Basophils Relative 1 %      Neutrophils Absolute 9 54 Thousands/µL      Immature Grans Absolute 0 13 Thousand/uL      Lymphocytes Absolute 1 29 Thousands/µL      Monocytes Absolute 1 13 Thousand/µL      Eosinophils Absolute 0 03 Thousand/µL      Basophils Absolute 0 08 Thousands/µL                  CT abdomen pelvis wo contrast   Final Result by Kisha Leroy DO (04/01 2381)      Status post colectomy with right lower quadrant ileostomy  Moderately dilated loops of small bowel slightly decreased compared to prior study  Differential considerations include ileus versus small bowel obstruction  Recommend small bowel follow-through for further evaluation  Tiger text message was sent to Paty [place] on 4/1/2022 at 3:04 AM                      Workstation performed: [id number]                    Procedures  Procedures         ED Course                               SBIRT 22yo+      Most Recent Value   SBIRT (23 yo +)    In order to provide better care to our patients, we are screening all of our patients for alcohol and drug use  Would it be okay to ask you these screening questions? No Filed at: 04/01/2022 0130   Initial Alcohol Screen: US AUDIT-C     1  How often do you have a drink containing alcohol? 0 Filed at: 04/01/2022 0130   2  How many drinks containing alcohol do you have on a typical day you are drinking? 0 Filed at: 04/01/2022 0130   3a  Male UNDER 65: How often do you have five or more drinks on one occasion? 0 Filed at: 04/01/2022 0130   3b  FEMALE Any Age, or MALE 65+: How often do you have 4 or more drinks on one occassion? 0 Filed at: 04/01/2022 0130   Audit-C Score 0 Filed at: 04/01/2022 0130   GORDON: How many times in the past year have you    Used an illegal drug or used a prescription medication for non-medical reasons? Never Filed at: 04/01/2022 0130                    MDM  Number of Diagnoses or Management Options  JENAE (acute kidney injury) [facility]): new and requires workup  Hyponatremia: new and requires workup  Diagnosis management comments: This is a 13-year-old man with severe JENAE resulting in significant hyponatremia  Patient given saline  Patient admitted for management of the severe JENAE  He appears stable at the time of admission  States understanding agreement with the plan  Discussed possible treatment with Nephrology and or possible need for dialysis if kidney function does not improve  Patient noted to still of bowel however these appear to be improving her CT  Amount and/or Complexity of Data Reviewed  Clinical lab tests: ordered and reviewed  Tests in the radiology section of CPT®: ordered and reviewed  Discuss the patient with other providers: yes  Independent visualization of images, tracings, or specimens: yes        Disposition  Final diagnoses:   [facility]) [facility]   Hyponatremia     Time reflects when diagnosis was documented in both MDM as applicable and the Disposition within this note     Time User Action Codes Description Comment    4/1/2022  3:19 AM Erla Swift Add [N17 9] [facility]     4/1/2022  3:19 AM Erla Swift Add [E87 1] Hyponatremia     4/1/2022  3:46 AM Lourdes OLIVA Add [K56 7] Ileus [facility])       ED Disposition     ED Disposition Condition Date/Time Comment    Admit Stable Fri Apr 1, 2022  3:18 AM Case was discussed with gurpreet and the patient's admission status was agreed to be Admission Status: inpatient status to the service of Dr chavez   Follow-up Information    None         Patient's Medications   Discharge Prescriptions    No medications on file       No discharge procedures on file      PDMP Review     None          ED Provider  Electronically Signed by           Adam Gan MD  04/01/22 5389

## 2022-04-02 PROBLEM — E87.6 HYPOKALEMIA: Status: ACTIVE | Noted: 2022-04-02

## 2022-04-02 LAB
ANION GAP SERPL CALCULATED.3IONS-SCNC: 12 MMOL/L (ref 4–13)
BUN SERPL-MCNC: 67 MG/DL (ref 5–25)
CALCIUM SERPL-MCNC: 9.4 MG/DL (ref 8.4–10.2)
CHLORIDE SERPL-SCNC: 93 MMOL/L (ref 96–108)
CO2 SERPL-SCNC: 24 MMOL/L (ref 21–32)
CREAT SERPL-MCNC: 3.51 MG/DL (ref 0.6–1.3)
ERYTHROCYTE [DISTWIDTH] IN BLOOD BY AUTOMATED COUNT: 12.6 % (ref 11.6–15.1)
GFR SERPL CREATININE-BSD FRML MDRD: 18 ML/MIN/1.73SQ M
GLUCOSE SERPL-MCNC: 108 MG/DL (ref 65–140)
HCT VFR BLD AUTO: 47.9 % (ref 36.5–49.3)
HGB BLD-MCNC: 17.2 G/DL (ref 12–17)
MAGNESIUM SERPL-MCNC: 2.5 MG/DL (ref 1.9–2.7)
MCH RBC QN AUTO: 29.7 PG (ref 26.8–34.3)
MCHC RBC AUTO-ENTMCNC: 35.9 G/DL (ref 31.4–37.4)
MCV RBC AUTO: 83 FL (ref 82–98)
PLATELET # BLD AUTO: 325 THOUSANDS/UL (ref 149–390)
PMV BLD AUTO: 9.8 FL (ref 8.9–12.7)
POTASSIUM SERPL-SCNC: 3.2 MMOL/L (ref 3.5–5.3)
RBC # BLD AUTO: 5.8 MILLION/UL (ref 3.88–5.62)
SODIUM SERPL-SCNC: 129 MMOL/L (ref 135–147)
WBC # BLD AUTO: 10.55 THOUSAND/UL (ref 4.31–10.16)

## 2022-04-02 PROCEDURE — 83735 ASSAY OF MAGNESIUM: CPT | Performed by: INTERNAL MEDICINE

## 2022-04-02 PROCEDURE — 80048 BASIC METABOLIC PNL TOTAL CA: CPT | Performed by: PHYSICIAN ASSISTANT

## 2022-04-02 PROCEDURE — 85027 COMPLETE CBC AUTOMATED: CPT | Performed by: PHYSICIAN ASSISTANT

## 2022-04-02 PROCEDURE — 99233 SBSQ HOSP IP/OBS HIGH 50: CPT | Performed by: INTERNAL MEDICINE

## 2022-04-02 PROCEDURE — 99232 SBSQ HOSP IP/OBS MODERATE 35: CPT | Performed by: INTERNAL MEDICINE

## 2022-04-02 RX ORDER — POTASSIUM CHLORIDE 20 MEQ/1
40 TABLET, EXTENDED RELEASE ORAL
Status: DISPENSED | OUTPATIENT
Start: 2022-04-02 | End: 2022-04-02

## 2022-04-02 RX ADMIN — HEPARIN SODIUM 5000 UNITS: 5000 INJECTION INTRAVENOUS; SUBCUTANEOUS at 15:35

## 2022-04-02 RX ADMIN — SODIUM CHLORIDE 100 ML/HR: 0.9 INJECTION, SOLUTION INTRAVENOUS at 01:12

## 2022-04-02 RX ADMIN — HEPARIN SODIUM 5000 UNITS: 5000 INJECTION INTRAVENOUS; SUBCUTANEOUS at 22:01

## 2022-04-02 RX ADMIN — HEPARIN SODIUM 5000 UNITS: 5000 INJECTION INTRAVENOUS; SUBCUTANEOUS at 05:22

## 2022-04-02 RX ADMIN — POTASSIUM CHLORIDE 40 MEQ: 1500 TABLET, EXTENDED RELEASE ORAL at 09:14

## 2022-04-02 NOTE — ASSESSMENT & PLAN NOTE
· Resolved- Likely hemoconcentration from significant dehydration  · Will continue to monitor off antibiotic therapy

## 2022-04-02 NOTE — UTILIZATION REVIEW
Initial Clinical Review    Admission: Date/Time/Statement:   Admission Orders (From admission, onward)     Ordered        04/01/22 0319  Inpatient Admission  Once                      Orders Placed This Encounter   Procedures    Inpatient Admission     Standing Status:   Standing     Number of Occurrences:   1     Order Specific Question:   Level of Care     Answer:   Med Surg [16]     Order Specific Question:   Bed request comments     Answer:   tele     Order Specific Question:   Estimated length of stay     Answer:   More than 2 Midnights     Order Specific Question:   Certification     Answer:   I certify that inpatient services are medically necessary for this patient for a duration of greater than two midnights  See H&P and MD Progress Notes for additional information about the patient's course of treatment  ED Arrival Information     Expected Arrival Acuity    - 4/1/2022 00:44 Urgent         Means of arrival Escorted by Service Admission type    Wheelchair Spouse Hospitalist Urgent         Arrival complaint    vomiting        Chief Complaint   Patient presents with    Dehydration     Initial Presentation: 46 y o  male presents to the ED from home with c/o increased watery output from colostomy, fatigue, weakness  PMH: UCwith ostomy, morbid obesity, recently hospitalized for dehydration, hyponatremia, sepsis, ARF  On exam he had decreased bowel sounds  In the ED he had leukocytosis, hyponatremia, elevated BUN/Cr, elevated serum osmo  Imaging shows either ileus or SBO  He is admitted to INPATIENT status with JENAE - creat 5 2 - IV fluids, trend BMP, Nephrology consult  Ileus - NPO, surgery consult  Leukocytosis - procalcitonin elevated at 1 08  Hyponatremia - IV fluids, urine studies  4/1 Surgery Consult - recent admit for N/V/D from viral gastroenterisis now dehydrated with JENAE, worsening hyponatremia  Report abd muscle cramping    On exam abd soft,nontender, nondistended, hypoactive BS, ileostomy has thing pastey green output and gas, ostomy pink  No surgical interventions, ok to advance diet, monitor and replete electrolyes, serial abd exams  4/1 Nephrology Consult - JENAE on CKD3 - creat 5 32 - likely d/t ATN d/t volume depletion from ileostomy output, IV fluids, urine studies, complements, PVR  Ileus - may need SBFT to check for obstruction  Hyponatremia - IV fluids  Date: 4/2   Day 2:  Continues on IV fluids, still with hyponatremia- resolving, decreasing leukocytosis, k 3 2, creat 6 51  Ileus resolving and tolerating a diet  Hypokalemia from diuresis - replete and trend  Ostomy output is back to normal   BMs returned to normal   Continue surgical soft diet  Leukocytosis resolved - probably d/t hemoconcentration        ED Triage Vitals   Temperature Pulse Respirations Blood Pressure SpO2   04/01/22 0103 04/01/22 0103 04/01/22 0103 04/01/22 0109 04/01/22 0103   (!) 97 °F (36 1 °C) 99 18 119/87 97 %      Temp Source Heart Rate Source Patient Position - Orthostatic VS BP Location FiO2 (%)   04/01/22 0103 04/01/22 0103 04/01/22 0749 04/01/22 0103 --   Temporal Monitor Sitting Left arm       Pain Score       04/01/22 0103       No Pain          Wt Readings from Last 1 Encounters:   04/02/22 134 kg (296 lb 4 8 oz)     Additional Vital Signs:   04/02/22 08:06:12 98 4 °F (36 9 °C) 77 18 140/101 Abnormal  114 94 % -- --   04/02/22 00:01:47 98 6 °F (37 °C) 74 18 141/95 110 96 % None (Room air) Lying   04/01/22 19:48:45 99 2 °F (37 3 °C) 89 18 124/81 95 92 % None (Room air) Lying   04/01/22 1500 -- 85 18 129/81 101 91 % None (Room air) Sitting   04/01/22 1300 -- 86 18 116/82 95 91 % None (Room air) Sitting   04/01/22 0749 -- 87 18 133/90 -- 96 % None (Room air) Sitting   04/01/22 0315 -- 87 19 131/78 98 93 % -- --     Pertinent Labs/Diagnostic Test Results:   CT abdomen pelvis wo contrast   Final Result by Kenton Dumont DO (04/01 4471)      Status post colectomy with right lower quadrant ileostomy  Moderately dilated loops of small bowel slightly decreased compared to prior study  Differential considerations include ileus versus small bowel obstruction  Recommend small bowel follow-through for further evaluation  Tiger text message was sent to Wallace Sean on 4/1/2022 at 3:04 AM                      Workstation performed: [id number]           Results from last 7 days   Lab Units 03/27/22  0554   SARS-COV-2  Negative     Results from last 7 days   Lab Units 04/02/22  0514 04/01/22  0139 03/29/22  0605 03/28/22  0453 03/28/22  0453 03/27/22  0448 03/27/22  0448   WBC Thousand/uL 10 55* 12 20* 5 49   < > 6 66   < > 21 03*   HEMOGLOBIN g/dL 17 2* 19 4* 16 2  --  16 7  --  18 4*   HEMATOCRIT % 47 9 55 7* 48 3  --  51 9*  --  56 7*   PLATELETS Thousands/uL 325 407* 314   < > 317   < > 381   NEUTROS ABS Thousands/µL  --  9 54* 2 90  --  4 12   < > 18 92*    < > = values in this interval not displayed  Results from last 7 days   Lab Units 04/02/22  0140 04/01/22  1533 04/01/22  0956 04/01/22  0139 03/29/22  0605 03/28/22  0453 03/28/22  0453   SODIUM mmol/L 129* 128* 128* 124* 133*   < > 135   POTASSIUM mmol/L 3 2* 3 8 3 3* 3 7 4 1   < > 4 2   CHLORIDE mmol/L 93* 88* 89* 88* 101   < > 102   CO2 mmol/L 24 27 23 15* 22   < > 22   ANION GAP mmol/L 12 13 16* 21* 10   < > 11   BUN mg/dL 67* 67* 63* 60* 23   < > 30*   CREATININE mg/dL 3 51* 4 35* 4 34* 5 20* 1 68*   < > 1 89*   EGFR ml/min/1 73sq m 18 14 14 11 45   < > 39   CALCIUM mg/dL 9 4 9 8 9 5 10 8* 9 8   < > 9 8   MAGNESIUM mg/dL 2 5 2 4  --   --   --   --  2 3   PHOSPHORUS mg/dL  --   --   --   --   --   --  4 1    < > = values in this interval not displayed       Results from last 7 days   Lab Units 04/01/22  0139 03/28/22  0453 03/27/22  0448   AST U/L 37 26 22   ALT U/L 49 28 22   ALK PHOS U/L 122* 81 100   TOTAL PROTEIN g/dL 9 9* 8 4 9 3*   ALBUMIN g/dL 5 4* 4 5 5 3*   TOTAL BILIRUBIN mg/dL 0 66 1 14* 1 11*         Results from last 7 days   Lab Units 04/02/22  0140 04/01/22  1533 04/01/22  0956 04/01/22  0139 03/29/22  0605 03/28/22  0453 03/27/22  0448   GLUCOSE RANDOM mg/dL 108 117 132 155* 112 125 164*     Results from last 7 days   Lab Units 04/01/22  1330   OSMOLALITY, SERUM mmol/*           Results from last 7 days   Lab Units 03/27/22  0448   PROTIME seconds 13 9   INR  1 08   PTT seconds 31     Results from last 7 days   Lab Units 04/01/22  1533   TSH 3RD GENERATON uIU/mL 1 339     Results from last 7 days   Lab Units 04/01/22  0139 03/27/22  0448   PROCALCITONIN ng/ml 1 08* 0 67*     Results from last 7 days   Lab Units 04/01/22  0139 03/27/22  0553   LACTIC ACID mmol/L 1 9 2 0      Results from last 7 days   Lab Units 04/01/22  0139 03/27/22  0448   LIPASE u/L 105* 29             Results from last 7 days   Lab Units 04/01/22  1330 04/01/22  0249   OSMOLALITY, SERUM mmol/*  --    OSMO UR mmol/ 360     Results from last 7 days   Lab Units 04/01/22  1330 04/01/22  0249 03/27/22  1624   CLARITY UA   --  Clear Clear   COLOR UA   --  Yellow Yellow   SPEC GRAV UA   --  >=1 030* >=1 030*   PH UA   --  5 0 5 5   GLUCOSE UA mg/dl  --  Negative Negative   KETONES UA mg/dl  --  Trace* Negative   BLOOD UA   --  2+* 1+*   PROTEIN UA mg/dl  --  2+* 2+*   NITRITE UA   --  Negative Negative   BILIRUBIN UA   --  1+* Negative   UROBILINOGEN UA E U /dl  --  0 2 0 2   LEUKOCYTES UA   --  Negative Negative   WBC UA /hpf  --  1-2 0-1   RBC UA /hpf  --  4-10* 1-2   BACTERIA UA /hpf  --  Moderate* None Seen   EPITHELIAL CELLS WET PREP /hpf  --  Occasional Occasional   SODIUM UR  <5 <5  --    CREATININE UR mg/dL 282 0  282 0  282 0 596 0  --    PROTEIN UR mg/dL 58  --   --    PROT/CREAT RATIO UR  0 21*  --   --      Results from last 7 days   Lab Units 03/27/22  0554   INFLUENZA A PCR  Negative   INFLUENZA B PCR  Negative   RSV PCR  Negative     Results from last 7 days   Lab Units 03/27/22  1624   C DIFF TOXIN B BY PCR  Negative Results from last 7 days   Lab Units 03/27/22  1628   SALMONELLA SP PCR  None Detected   SHIGELLA SP/ENTEROINVASIVE E  COLI (EIEC)  None Detected   CAMPYLOBACTER SP (JEJUNI AND COLI)  None Detected   SHIGA TOXIN 1/SHIGA TOXIN 2  None Detected         Results from last 7 days   Lab Units 03/27/22  0552   BLOOD CULTURE  No Growth After 5 Days  No Growth After 5 Days     ED Treatment:   Medication Administration from 04/01/2022 0044 to 04/01/2022 1943    Date/Time Order Dose Route Action   04/01/2022 0139 ondansetron (ZOFRAN) injection 4 mg 4 mg Intravenous Given   04/01/2022 0139 sodium chloride 0 9 % bolus 1,000 mL 1,000 mL Intravenous New Bag   04/01/2022 0422 sodium bicarbonate 150 mEq in dextrose 5 % 1,000 mL infusion 250 mL/hr Intravenous New Bag   04/01/2022 1313 heparin (porcine) subcutaneous injection 5,000 Units 5,000 Units Subcutaneous Given   04/01/2022 0546 heparin (porcine) subcutaneous injection 5,000 Units 5,000 Units Subcutaneous Given   04/01/2022 1058 multi-electrolyte (PLASMALYTE-A/ISOLYTE-S PH 7 4) IV solution 125 mL/hr Intravenous New Bag   04/01/2022 0422 sodium bicarbonate 8 4 % injection **ADS Override Pull**    Override Pull   04/01/2022 1057 potassium chloride (K-DUR,KLOR-CON) CR tablet 40 mEq 40 mEq Oral Given   04/01/2022 1631 sodium chloride 0 9 % with KCl 20 mEq/L infusion (premix) 100 mL/hr Intravenous New Bag        Past Medical History:   Diagnosis Date    Ulcerative colitis ([place] [address] )      Present on Admission:   Acute kidney injury (nontraumatic) ([place] [address] )   Hyponatremia   Ileus ([place] [address] )   Leukocytosis      Admitting Diagnosis: Dehydration [E86 0]  Hyponatremia [E87 1]  Ileus ([place] [address] ) [K56 7]  JENAE (acute kidney injury) ([place] [address] ) [N17 9]  Age/Sex: 46 y o  male  Admission Orders:  Scheduled Medications:  heparin (porcine), 5,000 Units, Subcutaneous, Q8H [address]      Continuous IV Infusions:  sodium chloride, 100 mL/hr, Intravenous, Continuous  Bicarb drip @ 150 cc/hr d/c on 4/1    PRN Meds:  acetaminophen, 650 mg, Oral, Q4H PRN  ondansetron, 4 mg, Intravenous, Q6H PRN    Surgical soft diet  Urine studies  IP CONSULT TO ACUTE CARE SURGERY  IP CONSULT TO NEPHROLOGY    Network Utilization Review Department  ATTENTION: Please call with any questions or concerns to [phone] and carefully listen to the prompts so that you are directed to the right person  All voicemails are confidential   Laura Campbell all requests for admission clinical reviews, approved or denied determinations and any other requests to dedicated fax number below belonging to the campus where the patient is receiving treatment   List of dedicated fax numbers for the Facilities:  [address] DENIALS (Administrative/Medical Necessity) [phone]   [address] (Maternity/NICU/Pediatrics) [phone]   [address]  [address] [facility] [place] [address] [id number] [place] [address] [address] [place] [address] [phone]

## 2022-04-02 NOTE — ASSESSMENT & PLAN NOTE
· Bowel movements have returned to his normal  · CT A/P (4/1): Status post colectomy with right lower quadrant ileostomy  Moderately dilated loops of small bowel slightly decreased compared to prior study   Differential considerations include ileus versus small bowel obstruction     · Continue surgical soft diet  · He was evaluated by General surgery who has no plans for surgical intervention at this time

## 2022-04-02 NOTE — CASE MANAGEMENT
Case Management Assessment & Discharge Planning Note    Patient name iMs Allison  Location /-01 MRN [id number]  : 1970 Date 2022       Current Admission Date: 2022  Current Admission Diagnosis:Acute kidney injury (nontraumatic) [facility])   Patient Active Problem List    Diagnosis Date Noted    [facility] ) 20[facility]    Leukocytosis 2022    Acute kidney injury (nontraumatic) ([facility] ) 2022    Hyponatremia 2022    Sepsis with acute renal failure without septic shock ([place] [address] ) 2022    Intra-abdominal infection 2022    Morbid obesity with BMI of 40 0-44 9, adult ([place] [address] ) 2022      LOS (days): 1  Geometric Mean LOS (GMLOS) (days):   Days to GMLOS:     OBJECTIVE:  PATIENT READMITTED TO HOSPITAL  Risk of Unplanned Readmission Score: 11     Current admission status: Inpatient    Preferred Pharmacy:   [address] [address]  Lois Souza, [address] [address]  Phone: [phone] Fax: [phone]    Primary Care Provider: Osvaldo Granger DO    Primary Insurance: [facility]  Secondary Insurance:     ASSESSMENT:  Inna 26 Proxies    There are no active Health Care Proxies on file         Advance Directives  Does patient have a [address] [place] [address] Avenue?: No  Was patient offered paperwork?: Yes (Papers provided)  Does patient currently have a Health Care decision maker?: Yes, please see Health Care Proxy section  Does patient have Advance Directives?: No  Was patient offered paperwork?: Yes (papers provided)  Primary Contact: David Bowers spouse    Readmission Root Cause  30 Day Readmission: Yes  Who directed you to return to the hospital?: Self  Did you understand whom to contact if you had questions or problems?: Yes  Did you get your prescriptions before you left the hospital?: No  Reason[de-identified] Declined service  Were you able to get your prescriptions filled when you left the hospital?: No (no meds to get)  Reason[de-identified]  (no meds to )  Did you take your medications as prescribed?: Yes  Were you able to get to your follow-up appointments?: No  Reason[de-identified] Readmitted prior to appointment  During previous admission, was a post-acute recommendation made?: No  Patient was readmitted due to: Dehydration    Patient Information  Admitted from[de-identified] Home  Mental Status: Alert  During Assessment patient was accompanied by: Not accompanied during assessment  Assessment information provided by[de-identified] Patient  Primary Caregiver: Self  Support Systems: Spouse/significant other  [place] of Residence: [address] do you live in?: [address] [facility] entry access options   Select all that apply : Stairs  Number of steps to enter home : 4  Do the steps have railings?: Yes  Type of Current Residence: 2 story home  Upon entering residence, is there a bedroom on the main floor (no further steps)?: No  A bedroom is located on the following floor levels of residence (select all that apply):: 2nd Floor  Upon entering residence, is there a bathroom on the main floor (no further steps)?: No  Indicate which floors of current residence have a bathroom (select all the apply):: 2nd Floor  Number of steps to 2nd floor from main floor: One Flight  In the last 12 months, was there a time when you were not able to pay the mortgage or rent on time?: No  In the last 12 months, how many places have you lived?: 1  In the last 12 months, was there a time when you did not have a steady place to sleep or slept in a shelter (including now)?: No  Homeless/housing insecurity resource given?: N/A  Living Arrangements: Lives w/ Spouse/significant other  Is patient a ?: No    Activities of Daily Living Prior to Admission  Functional Status: Independent  Completes ADLs independently?: Yes  Ambulates independently?: Yes  Does patient use assisted devices?: No  Does patient currently own DME?: No  Does patient have a history of Outpatient Therapy (PT/OT)?: No  Does the patient have a history of Short-Term Rehab?: No  Does patient have a history of HHC?: No  Does patient currently have [facility]?: No    Patient Information Continued  Income Source: Employed  Does patient have prescription coverage?: Yes  Within the past 12 months, you worried that your food would run out before you got the money to buy more : Never true  Within the past 12 months, the food you bought just didnt last and you didnt have money to get more : Never true  Food insecurity resource given?: N/A  Does patient receive dialysis treatments?: No  Does patient have a history of substance abuse?: No    PHQ 2/9 Screening   Reviewed PHQ 2/9 Depression Screening Score?: No    Means of Transportation  Means of Transport to Appts[de-identified] Drives Self  In the past 12 months, has lack of transportation kept you from medical appointments or from getting medications?: No  In the past 12 months, has lack of transportation kept you from meetings, work, or from getting things needed for daily living?: No  Was application for public transport provided?: N/A        DISCHARGE DETAILS:    Discharge planning discussed with[de-identified] [address] [place] [address]         Is the patient interested in [facility] at discharge?: No    DME Referral Provided  Referral made for DME?: No  Would you like to participate in our [address] [place] [address] service program?  : No - Declined    Treatment Team Recommendation: Home  Discharge Destination Plan[de-identified] Home

## 2022-04-02 NOTE — ASSESSMENT & PLAN NOTE
· Likely due to increased ostomy output and dehydration  · Ostomy output has returned to normal at this time and he is having urinary frequency  · Creatinine was 1 6 at discharge and 5 2 on presentation  · Creatinine has down trended to 3 51  · Continue normal saline resuscitation  · Nephrology following

## 2022-04-02 NOTE — PROGRESS NOTES
[address]  Progress Note Artem Cueva [birth year], 46 y o  male MRN: [id number]  Unit/Bed#: -01 Encounter: [id number]  Primary Care Provider: Didi Joe DO   Date and time admitted to hospital: 4/1/2022  1:14 AM    * Acute kidney injury (nontraumatic) ([address] )  Assessment & Plan  · Likely due to increased ostomy output and dehydration  · Ostomy output has returned to normal at this time and he is having urinary frequency  · Creatinine was 1 6 at discharge and 5 2 on presentation  · Creatinine has down trended to 3 51  · Continue normal saline resuscitation  · Nephrology following      Ileus [facility])  Assessment & Plan  · Bowel movements have returned to his normal  · CT A/P (4/1): Status post colectomy with right lower quadrant ileostomy  Moderately dilated loops of small bowel slightly decreased compared to prior study   Differential considerations include ileus versus small bowel obstruction  · Continue surgical soft diet  · He was evaluated by General surgery who has no plans for surgical intervention at this time    Hyponatremia  Assessment & Plan  · Sodium improved to 129 this morning  · Will continue IV fluid resuscitation as above    Hypokalemia  Assessment & Plan  · Potassium 3 2 this morning  · Replete and follow up morning labs    Leukocytosis  Assessment & Plan  · Resolved- Likely hemoconcentration from significant dehydration  · Will continue to monitor off antibiotic therapy      Morbid obesity with BMI of 40 0-44 9, adult (HCC)  Assessment & Plan  · Recommend continued therapeutic exercise and dietary plan      VTE Pharmacologic Prophylaxis: VTE Score: 3 Moderate Risk (Score 3-4) - Pharmacological DVT Prophylaxis Ordered: heparin  Patient Centered Rounds: I performed bedside rounds with nursing staff today    Discussions with Specialists or Other Care Team Provider: Nephrology, CM, and Nursing    Education and Discussions with Family / Patient:  Attempted to call the patient's wife at [phone] but was unable to reach  Will attempt again later this afternoon    Time Spent for Care: 45 minutes  More than 50% of total time spent on counseling and coordination of care as described above  Current Length of Stay: 1 day(s)  Current Patient Status: Inpatient   Certification Statement: The patient will continue to require additional inpatient hospital stay due to JENAE, Hyponatremia  Discharge Plan: Anticipate discharge in 24-48 hrs to home  Code Status: Level 1 - Full Code    Subjective:   Patient is resting comfortably in bed without any acute complaints  He notes significant improvement in his ostomy output and frequent urination overnight  No significant overnight events reported by nursing  Objective:     Vitals:   Temp (24hrs), Av 7 °F (37 1 °C), Min:98 4 °F (36 9 °C), Max:99 2 °F (37 3 °C)    Temp:  [98 4 °F (36 9 °C)-99 2 °F (37 3 °C)] 98 4 °F (36 9 °C)  HR:  [74-89] 77  Resp:  [18] 18  BP: (116-141)/() 140/101  SpO2:  [91 %-96 %] 94 %  Body mass index is 42 51 kg/m²  Input and Output Summary (last 24 hours): Intake/Output Summary (Last 24 hours) at 2022 1112  Last data filed at 2022 0112  Gross per 24 hour   Intake 2660 ml   Output 50 ml   Net 2610 ml       Physical Exam:   Physical Exam  Vitals and nursing note reviewed  Constitutional:       General: He is not in acute distress  Appearance: Normal appearance  He is obese  HENT:      Head: Normocephalic and atraumatic  Mouth/Throat:      Mouth: Mucous membranes are moist       Pharynx: Oropharynx is clear  Eyes:      Extraocular Movements: Extraocular movements intact  Conjunctiva/sclera: Conjunctivae normal    Cardiovascular:      Rate and Rhythm: Normal rate and regular rhythm  Pulses: Normal pulses  Heart sounds: Normal heart sounds  Pulmonary:      Effort: Pulmonary effort is normal  No respiratory distress        Breath sounds: Normal breath sounds  No wheezing  Abdominal:      General: Bowel sounds are normal  There is no distension  Palpations: Abdomen is soft  Tenderness: There is no abdominal tenderness  Comments: Ileostomy   Musculoskeletal:         General: Normal range of motion  Cervical back: Normal range of motion and neck supple  Skin:     General: Skin is warm and dry  Neurological:      General: No focal deficit present  Mental Status: He is alert and oriented to person, place, and time  Mental status is at baseline  Psychiatric:         Mood and Affect: Mood normal          Behavior: Behavior normal          Judgment: Judgment normal          Labs:  Results from last 7 days   Lab Units 04/02/22  0514 04/01/22 0139 04/01/22 0139   WBC Thousand/uL 10 55*   < > 12 20*   HEMOGLOBIN g/dL 17 2*   < > 19 4*   HEMATOCRIT % 47 9   < > 55 7*   PLATELETS Thousands/uL 325   < > 407*   NEUTROS PCT %  --   --  78*   LYMPHS PCT %  --   --  11*   MONOS PCT %  --   --  9   EOS PCT %  --   --  0    < > = values in this interval not displayed  Results from last 7 days   Lab Units 04/02/22  0140 04/01/22  0956 04/01/22 0139   SODIUM mmol/L 129*   < > 124*   POTASSIUM mmol/L 3 2*   < > 3 7   CHLORIDE mmol/L 93*   < > 88*   CO2 mmol/L 24   < > 15*   BUN mg/dL 67*   < > 60*   CREATININE mg/dL 3 51*   < > 5 20*   ANION GAP mmol/L 12   < > 21*   CALCIUM mg/dL 9 4   < > 10 8*   ALBUMIN g/dL  --   --  5 4*   TOTAL BILIRUBIN mg/dL  --   --  0 66   ALK PHOS U/L  --   --  122*   ALT U/L  --   --  49   AST U/L  --   --  37   GLUCOSE RANDOM mg/dL 108   < > 155*    < > = values in this interval not displayed       Results from last 7 days   Lab Units 03/27/22 0448   INR  1 08             Results from last 7 days   Lab Units 04/01/22 0139 03/27/22  0553 03/27/22 0448   LACTIC ACID mmol/L 1 9 2 0  --    PROCALCITONIN ng/ml 1 08*  --  0 67*       Lines/Drains:  Invasive Devices  Report    Peripheral Intravenous Line Peripheral IV 04/01/22 Right Antecubital 1 day          Drain            Colostomy RLQ -- days              Imaging: No new imaging  Recent Cultures (last 7 days):   Results from last 7 days   Lab Units 03/27/22  1624 03/27/22  0552   BLOOD CULTURE   --  No Growth After 5 Days  No Growth After 5 Days  C DIFF TOXIN B BY PCR  Negative  --        Last 24 Hours Medication List:   Current Facility-Administered Medications   Medication Dose Route Frequency Provider Last Rate    acetaminophen  650 mg Oral Q4H PRN LISA Pineda-KENIA      heparin (porcine)  5,000 Units Subcutaneous [place], [place]      ondansetron  4 mg Intravenous Q6H PRN LISA Pineda-C      potassium chloride  40 mEq Oral Q2H [place] Galvez, DO      sodium chloride  100 mL/hr Intravenous Continuous Janell eVrnon  mL/hr (04/02/22 0112)        Today, Patient Was Seen By: Giuliana Whyte DO    **Please Note: This note may have been constructed using a voice recognition system  **

## 2022-04-02 NOTE — PROGRESS NOTES
Progress Note - Nephrology   Sandra Lizama 46 y o  male MRN: [id number]  Unit/Bed#: -01 Encounter: [id number]    A/P:  1  Acute kidney injury present on admission superimposed on chronic kidney disease stage 3   - presented with oliguric renal failure on Wednesday  - creatinine was 5 32 which has trended down to 3 51 mg/dL today   - nonoliguric and says he is polyuric  He has +4 6 L   - etiology is due to volume depletion and probable acute tubular necrosis due to volume depletion on the basis of high output ostomy  - continue saline restoration    2  Chronic kidney disease stage 3   - baseline creatinine 1 3-1 6 mg/dL   - he had a normal kidney ultrasound in February of 2022    3  Ileus   - appears to have resolved and he is eating well  - says ostomy output is no a gel like consistency    4  Hyponatremia   -  resolving with a serum sodium improving to 129     - Continue sodium chloride IV fluid    5  Micro albuminuria   - will need an outpatient workup including an SPEP, UPEP      7  Hypertension   - not on medications at home  - continue to monitor as may be related to acute kidney injury   - would start amlodipine 5 mg a blood pressure stays elevated    8  Hypokalemia   - with diuresis  Being treated      Follow up reason for today's visit:  Acute kidney injury    Ac[facility] kidney [facility] (nontraumatic) [facility])    Patient Active Problem List   Diagnosis    Sepsis with acute renal failure without septic shock ([facility] )    Intra-abdominal infection    Morbid obesity with BMI of 40 0-44 9, adult ([facility] )    Acute kidney injury (nontraumatic) ([facility] )    Hyponatremia    Ileus (HCC)    Leukocytosis         Subjective:   He feels much looks improved today  A 10 point review of systems is unremarkable  He says he is voiding well    Objective:     Vitals: Blood pressure (!) 140/101, pulse 77, temperature 98 4 °F (36 9 °C), resp  rate 18, height 5' 10" (1 778 m), weight 134 kg (296 lb 4 8 oz), SpO2 94 %  ,Body mass index is 42 51 kg/m²  Weight (last 2 days)     Date/Time Weight    04/02/22 0500 134 (296 3)    04/01/22 0103 136 (300)            Intake/Output Summary (Last 24 hours) at 4/2/2022 1052  Last data filed at 4/2/2022 0112  Gross per 24 hour   Intake 3660 ml   Output 50 ml   Net 3610 ml     I/O last 3 completed shifts: In: 2819 [P O :300; I V :3360; IV Piggyback:1000]  Out: 50 [Urine:50]         Physical Exam: BP (!) 140/101   Pulse 77   Temp 98 4 °F (36 9 °C)   Resp 18   Ht 5' 10" (1 778 m)   Wt 134 kg (296 lb 4 8 oz)   SpO2 94%   BMI 42 51 kg/m²     General Appearance:    Alert, cooperative, no distress, appears stated age   Head:    Normocephalic, without obvious abnormality, atraumatic   Eyes:    Conjunctiva/corneas clear   Ears:    Normal external ears   Nose:   Nares normal, septum midline, mucosa normal, no drainage    or sinus tenderness   Throat:   Lips, mucosa, and tongue normal; teeth and gums normal   Neck:   Supple, symmetrical, trachea midline, no adenopathy;        thyroid:  No enlargement/tenderness/nodules; no carotid    bruit or JVD   Back:     Symmetric, no curvature, ROM normal, no CVA tenderness   Lungs:     Clear to auscultation bilaterally, respirations unlabored   Chest wall:    No tenderness or deformity   Heart:    Regular rate and rhythm, S1 and S2 normal, no murmur, rub   or gallop   Abdomen:     Soft, non-tender, bowel sounds active ostomy working well   Extremities:   Extremities normal, atraumatic, no cyanosis or edema   Skin:   Skin color, texture, turgor normal, no rashes or lesions   Lymph nodes:   Cervical normal   Neurologic:   CNII-XII intact            Lab, Imaging and other studies: I have personally reviewed pertinent labs    CBC:   Lab Results   Component Value Date    WBC 10 55 (H) 04/02/2022    HGB 17 2 (H) 04/02/2022    HCT 47 9 04/02/2022    MCV 83 04/02/2022     04/02/2022    MCH 29 7 04/02/2022    MCHC 35 9 04/02/2022    RDW 12 6 04/02/2022    MPV 9 8 04/02/2022     CMP:   Lab Results   Component Value Date    K 3 2 (L) 04/02/2022    CL 93 (L) 04/02/2022    CO2 24 04/02/2022    BUN 67 (H) 04/02/2022    CREATININE 3 51 (H) 04/02/2022    CALCIUM 9 4 04/02/2022    EGFR 18 04/02/2022         Results from last 7 days   Lab Units 04/02/22  0140 04/01/22  1533 04/01/22  0956 04/01/22  0139 04/01/22  0139 03/29/22  0605 03/28/22  0453 03/27/22  0448 03/27/22  0448   POTASSIUM mmol/L 3 2* 3 8 3 3*   < > 3 7   < > 4 2   < > 4 6   CHLORIDE mmol/L 93* 88* 89*   < > 88*   < > 102   < > 99   CO2 mmol/L 24 27 23   < > 15*   < > 22   < > 21   BUN mg/dL 67* 67* 63*   < > 60*   < > 30*   < > 27*   CREATININE mg/dL 3 51* 4 35* 4 34*   < > 5 20*   < > 1 89*   < > 2 14*   CALCIUM mg/dL 9 4 9 8 9 5   < > 10 8*   < > 9 8   < > 10 8*   ALK PHOS U/L  --   --   --   --  122*  --  81  --  100   ALT U/L  --   --   --   --  49  --  28  --  22   AST U/L  --   --   --   --  37  --  26  --  22    < > = values in this interval not displayed  Phosphorus: No results found for: PHOS  Magnesium:   Lab Results   Component Value Date    MG 2 5 04/02/2022     Urinalysis: No results found for: Verlyn Cross, SPECGRAV, PHUR, LEUKOCYTESUR, NITRITE, PROTEINUA, GLUCOSEU, KETONESU, BILIRUBINUR, BLOODU  Ionized Calcium: No results found for: CAION  Coagulation: No results found for: PT, INR, APTT  Troponin: No results found for: TROPONINI  ABG: No results found for: PHART, MRJ5EXQ, PO2ART, DEO5IPZ, P1QHQJUZ, BEART, SOURCE  Radiology review:     IMAGING  Procedure: CT abdomen pelvis wo contrast    Result Date: 4/1/2022  Narrative: CT ABDOMEN AND PELVIS WITHOUT IV CONTRAST INDICATION:   recent bowel obstruction, cholelithiasis  COMPARISON:  March 27, 2023 TECHNIQUE:  CT examination of the abdomen and pelvis was performed without intravenous contrast   Axial, sagittal, and coronal 2D reformatted images were created from the source data and submitted for interpretation   Radiation dose length product (DLP) for this visit:  1629 79 mGy-cm   This examination, like all CT scans performed in the [facility], was performed utilizing techniques to minimize radiation dose exposure, including the use of iterative reconstruction and automated exposure control  Enteric contrast was not administered  FINDINGS: Study is limited due to lack of intravenous and oral contrast to evaluate for abdominal organs and vascular structures ABDOMEN LOWER CHEST:  No clinically significant abnormality identified in the visualized lower chest  LIVER/BILIARY TREE:  Unremarkable  GALLBLADDER:  There are gallstone(s) within the gallbladder, without pericholecystic inflammatory changes  SPLEEN:  Unremarkable  PANCREAS:  Unremarkable  ADRENAL GLANDS:  Unremarkable  KIDNEYS/URETERS:  Left-sided renal atrophy is seen  Unchanged stone in the left renal pelvis is seen  No evidence of hydronephrosis  STOMACH AND BOWEL:  Status post total colectomy  Right lower quadrant ileostomy is seen  Fluid-filled loops of small bowel measuring up to 4 cm are visualized  APPENDIX:  Absent ABDOMINOPELVIC CAVITY:  No ascites  No pneumoperitoneum  No lymphadenopathy  VESSELS:  Atherosclerotic changes are present  No evidence of aneurysm  PELVIS REPRODUCTIVE ORGANS:  Unremarkable for patient's age  URINARY BLADDER:  Unremarkable  ABDOMINAL WALL/INGUINAL REGIONS:  Bilateral small fat-containing inguinal hernias are seen  Right lower quadrant ostomy with parastomal hernia containing fat and loops of small bowel OSSEOUS STRUCTURES:  No acute fracture or destructive osseous lesion  Spinal degenerative changes are noted  Impression: Status post colectomy with right lower quadrant ileostomy  Moderately dilated loops of small bowel slightly decreased compared to prior study  Differential considerations include ileus versus small bowel obstruction  Recommend small bowel follow-through for further evaluation    Tiger text message was sent to KENDY Peña Mow on 4/1/2022 at 3:04 AM  Workstation performed: [id number]       Current Facility-Administered Medications   Medication Dose Route Frequency    acetaminophen (TYLENOL) tablet 650 mg  650 mg Oral Q4H PRN    heparin (porcine) subcutaneous injection 5,000 Units  5,000 Units Subcutaneous Q8H [address]    ondansetron (ZOFRAN) injection 4 mg  4 mg Intravenous Q6H PRN    potassium chloride (K-DUR,KLOR-CON) CR tablet 40 mEq  40 mEq Oral Q2H    sodium chloride 0 9 % infusion  100 mL/hr Intravenous Continuous     Medications Discontinued During This Encounter   Medication Reason    erythromycin (ROMYCIN) ophthalmic ointment     multi-electrolyte (PLASMALYTE-A/ISOLYTE-S PH 7 4) IV solution     sodium chloride 0 9 % infusion     sodium bicarbonate 150 mEq in dextrose 5 % 1,000 mL infusion     multi-electrolyte (PLASMALYTE-A/ISOLYTE-S PH 7 4) IV solution        Danielle Walker MD      This progress note was produced in part using a dictation device which may document imprecise wording from author's original intent

## 2022-04-03 LAB
ANION GAP SERPL CALCULATED.3IONS-SCNC: 10 MMOL/L (ref 4–13)
BUN SERPL-MCNC: 55 MG/DL (ref 5–25)
CALCIUM SERPL-MCNC: 9.1 MG/DL (ref 8.4–10.2)
CHLORIDE SERPL-SCNC: 99 MMOL/L (ref 96–108)
CO2 SERPL-SCNC: 24 MMOL/L (ref 21–32)
CREAT SERPL-MCNC: 2.38 MG/DL (ref 0.6–1.3)
ERYTHROCYTE [DISTWIDTH] IN BLOOD BY AUTOMATED COUNT: 13.1 % (ref 11.6–15.1)
GFR SERPL CREATININE-BSD FRML MDRD: 29 ML/MIN/1.73SQ M
GLUCOSE SERPL-MCNC: 104 MG/DL (ref 65–140)
HCT VFR BLD AUTO: 47.4 % (ref 36.5–49.3)
HGB BLD-MCNC: 16 G/DL (ref 12–17)
MCH RBC QN AUTO: 29.7 PG (ref 26.8–34.3)
MCHC RBC AUTO-ENTMCNC: 33.8 G/DL (ref 31.4–37.4)
MCV RBC AUTO: 88 FL (ref 82–98)
PLATELET # BLD AUTO: 301 THOUSANDS/UL (ref 149–390)
PMV BLD AUTO: 9.8 FL (ref 8.9–12.7)
POTASSIUM SERPL-SCNC: 3.8 MMOL/L (ref 3.5–5.3)
RBC # BLD AUTO: 5.38 MILLION/UL (ref 3.88–5.62)
SODIUM SERPL-SCNC: 133 MMOL/L (ref 135–147)
WBC # BLD AUTO: 9.25 THOUSAND/UL (ref 4.31–10.16)

## 2022-04-03 PROCEDURE — 85027 COMPLETE CBC AUTOMATED: CPT | Performed by: INTERNAL MEDICINE

## 2022-04-03 PROCEDURE — 99232 SBSQ HOSP IP/OBS MODERATE 35: CPT | Performed by: INTERNAL MEDICINE

## 2022-04-03 PROCEDURE — 80048 BASIC METABOLIC PNL TOTAL CA: CPT | Performed by: INTERNAL MEDICINE

## 2022-04-03 RX ADMIN — HEPARIN SODIUM 5000 UNITS: 5000 INJECTION INTRAVENOUS; SUBCUTANEOUS at 06:18

## 2022-04-03 RX ADMIN — SODIUM CHLORIDE 100 ML/HR: 0.9 INJECTION, SOLUTION INTRAVENOUS at 10:16

## 2022-04-03 RX ADMIN — HEPARIN SODIUM 5000 UNITS: 5000 INJECTION INTRAVENOUS; SUBCUTANEOUS at 22:06

## 2022-04-03 RX ADMIN — SODIUM CHLORIDE 100 ML/HR: 0.9 INJECTION, SOLUTION INTRAVENOUS at 00:13

## 2022-04-03 RX ADMIN — SODIUM CHLORIDE 100 ML/HR: 0.9 INJECTION, SOLUTION INTRAVENOUS at 20:37

## 2022-04-03 RX ADMIN — HEPARIN SODIUM 5000 UNITS: 5000 INJECTION INTRAVENOUS; SUBCUTANEOUS at 15:12

## 2022-04-03 NOTE — ASSESSMENT & PLAN NOTE
· Bowel movements have returned to his normal  · CT A/P (4/1): Status post colectomy with right lower quadrant ileostomy  Moderately dilated loops of small bowel slightly decreased compared to prior study   Differential considerations include ileus versus small bowel obstruction     · Initiate regular diet at this time  · He was evaluated by General surgery who has no plans for surgical intervention at this time

## 2022-04-03 NOTE — PROGRESS NOTES
Progress Note - Nephrology   Rigo Briones 46 y o  male MRN: [id number]  Unit/Bed#: -01 Encounter: [id number]    A/P:  1  Acute kidney injury present on admission   - presented with oliguric renal failure with a creatinine of 5 32 mg/dL   - treated with normal saline at 100 ml/hr and creatinine has dropped 2 8 mg/dL   - nonoliguric: +4910ml  Balance   - Continue to monitor    - he looks and feels well and agrees to outpatient renal follow up    2  Chronic kidney disease stage 3   - baseline creatinine1 3-1 6 mg/dL   - normal kidney ultrasound last in February   - microalbumin to creatinine ratio 41 milligrams/gram very slightly elevated   - will review in the outpatient setting  May be due to morbid obesity    3  He will eat   - resolved with thickening of his ostomy outputs within normal limits    4  Hyponatremia    -  improved to 133 millimoles per L    5  Hypertension   - blood pressure is controlled  Follow up reason for today's visit:  Acute kidney injury    [facility] kidney [facility] (nontraumatic) [facility])    Patient Active Problem List   Diagnosis    Sepsis with acute renal failure without septic shock ([facility] )    Intra-abdominal infection    Morbid obesity with BMI of 40 0-44 9, adult ([facility] )    Acute kidney injury (nontraumatic) (HCC)    Hyponatremia    Ileus (HCC)    Leukocytosis    Hypokalemia         Subjective:   Events of last night reviewed  I have reviewed the notes some-unremarkable  A 10 point review of systems is unremarkable    Objective:     Vitals: Blood pressure 139/90, pulse 67, temperature 98 6 °F (37 °C), resp  rate 18, height 5' 10" (1 778 m), weight 134 kg (296 lb 4 8 oz), SpO2 97 %  ,Body mass index is 42 51 kg/m²      Weight (last 2 days)     Date/Time Weight    04/02/22 0500 134 (296 3)    04/01/22 0103 136 (300)            Intake/Output Summary (Last 24 hours) at 4/3/2022 1117  Last data filed at 4/3/2022 0900  Gross per 24 hour   Intake 300 ml   Output --   Net 300 ml I/O last 3 completed shifts: In: 1960 [I V :1960]  Out: -          Physical Exam: /90   Pulse 67   Temp 98 6 °F (37 °C)   Resp 18   Ht 5' 10" (1 778 m)   Wt 134 kg (296 lb 4 8 oz)   SpO2 97%   BMI 42 51 kg/m²     General Appearance:    Alert, cooperative, no distress, appears stated age   Head:    Normocephalic, without obvious abnormality, atraumatic   Eyes:    Conjunctiva/corneas clear   Ears:    Normal external ears   Nose:   Nares normal, septum midline, mucosa normal, no drainage    or sinus tenderness   Throat:   Lips, mucosa, and tongue normal; teeth and gums normal   Neck:   Supple, symmetrical, trachea midline, no adenopathy;        thyroid:  No enlargement/tenderness/nodules; no carotid    bruit or JVD   Back:     Symmetric, no curvature, ROM normal, no CVA tenderness   Lungs:     Clear to auscultation bilaterally, respirations unlabored   Chest wall:    No tenderness or deformity   Heart:    Regular rate and rhythm, S1 and S2 normal, no murmur, rub   or gallop   Abdomen:     Soft, non-tender, bowel sounds active ostomy was gel like consistency   Extremities:   Extremities normal, atraumatic, no cyanosis or edema   Skin:   Skin color, texture, turgor normal, no rashes or lesions   Lymph nodes:   Cervical normal   Neurologic:   CNII-XII intact            Lab, Imaging and other studies: I have personally reviewed pertinent labs  CBC:   Lab Results   Component Value Date    WBC 9 25 04/03/2022    HGB 16 0 04/03/2022    HCT 47 4 04/03/2022    MCV 88 04/03/2022     04/03/2022    MCH 29 7 04/03/2022    MCHC 33 8 04/03/2022    RDW 13 1 04/03/2022    MPV 9 8 04/03/2022     CMP:   Lab Results   Component Value Date    K 3 8 04/03/2022    CL 99 04/03/2022    CO2 24 04/03/2022    BUN 55 (H) 04/03/2022    CREATININE 2 38 (H) 04/03/2022    CALCIUM 9 1 04/03/2022    EGFR 29 04/03/2022           Results from last 7 days   Lab Units 04/03/22  0618 04/02/22  0140 04/01/22  1533 04/01/22  4074 04/01/22  0139 03/29/22  0605 03/28/22  0453   POTASSIUM mmol/L 3 8 3 2* 3 8   < > 3 7   < > 4 2   CHLORIDE mmol/L 99 93* 88*   < > 88*   < > 102   CO2 mmol/L 24 24 27   < > 15*   < > 22   BUN mg/dL 55* 67* 67*   < > 60*   < > 30*   CREATININE mg/dL 2 38* 3 51* 4 35*   < > 5 20*   < > 1 89*   CALCIUM mg/dL 9 1 9 4 9 8   < > 10 8*   < > 9 8   ALK PHOS U/L  --   --   --   --  122*  --  81   ALT U/L  --   --   --   --  49  --  28   AST U/L  --   --   --   --  37  --  26    < > = values in this interval not displayed  Phosphorus: No results found for: PHOS  Magnesium: No results found for: MG  Urinalysis: No results found for: Raffaele Jeffrey, SPECGRAV, PHUR, LEUKOCYTESUR, NITRITE, PROTEINUA, GLUCOSEU, KETONESU, BILIRUBINUR, BLOODU  Ionized Calcium: No results found for: CAION  Coagulation: No results found for: PT, INR, APTT  Troponin: No results found for: TROPONINI  ABG: No results found for: PHART, PDW4WUQ, PO2ART, GPI3ZKC, S5WINLBM, BEART, SOURCE  Radiology review:     IMAGING  Procedure: CT abdomen pelvis wo contrast    Result Date: 4/1/2022  Narrative: CT ABDOMEN AND PELVIS WITHOUT IV CONTRAST INDICATION:   recent bowel obstruction, cholelithiasis  COMPARISON:  March 27, 2023 TECHNIQUE:  CT examination of the abdomen and pelvis was performed without intravenous contrast   Axial, sagittal, and coronal 2D reformatted images were created from the source data and submitted for interpretation  Radiation dose length product (DLP) for this visit:  1629 79 mGy-cm   This examination, like all CT scans performed in the [facility], was performed utilizing techniques to minimize radiation dose exposure, including the use of iterative reconstruction and automated exposure control  Enteric contrast was not administered   FINDINGS: Study is limited due to lack of intravenous and oral contrast to evaluate for abdominal organs and vascular structures ABDOMEN LOWER CHEST:  No clinically significant abnormality identified in the visualized lower chest  LIVER/BILIARY TREE:  Unremarkable  GALLBLADDER:  There are gallstone(s) within the gallbladder, without pericholecystic inflammatory changes  SPLEEN:  Unremarkable  PANCREAS:  Unremarkable  ADRENAL GLANDS:  Unremarkable  KIDNEYS/URETERS:  Left-sided renal atrophy is seen  Unchanged stone in the left renal pelvis is seen  No evidence of hydronephrosis  STOMACH AND BOWEL:  Status post total colectomy  Right lower quadrant ileostomy is seen  Fluid-filled loops of small bowel measuring up to 4 cm are visualized  APPENDIX:  Absent ABDOMINOPELVIC CAVITY:  No ascites  No pneumoperitoneum  No lymphadenopathy  VESSELS:  Atherosclerotic changes are present  No evidence of aneurysm  PELVIS REPRODUCTIVE ORGANS:  Unremarkable for patient's age  URINARY BLADDER:  Unremarkable  ABDOMINAL WALL/INGUINAL REGIONS:  Bilateral small fat-containing inguinal hernias are seen  Right lower quadrant ostomy with parastomal hernia containing fat and loops of small bowel OSSEOUS STRUCTURES:  No acute fracture or destructive osseous lesion  Spinal degenerative changes are noted  Impression: Status post colectomy with right lower quadrant ileostomy  Moderately dilated loops of small bowel slightly decreased compared to prior study  Differential considerations include ileus versus small bowel obstruction  Recommend small bowel follow-through for further evaluation    Tiger text message was sent to Peter Farias on 4/1/2022 at 3:04 AM  Workstation performed: [id number]       Current Facility-Administered Medications   Medication Dose Route Frequency    acetaminophen (TYLENOL) tablet 650 mg  650 mg Oral Q4H PRN    heparin (porcine) subcutaneous injection 5,000 Units  5,000 Units Subcutaneous Q8H [facility]    ondansetron (ZOFRAN) injection 4 mg  4 mg Intravenous Q6H PRN    sodium chloride 0 9 % infusion  100 mL/hr Intravenous Continuous     Medications Discontinued During This Encounter   Medication Reason    erythromycin (ROMYCIN) ophthalmic ointment     multi-electrolyte (PLASMALYTE-A/ISOLYTE-S PH 7 4) IV solution     sodium chloride 0 9 % infusion     sodium bicarbonate 150 mEq in dextrose 5 % 1,000 mL infusion     multi-electrolyte (PLASMALYTE-A/ISOLYTE-S PH 7 4) IV solution        Ba Randhawa MD      This progress note was produced in part using a dictation device which may document imprecise wording from author's original intent

## 2022-04-03 NOTE — ASSESSMENT & PLAN NOTE
· Likely due to increased ostomy output and dehydration  · Ostomy output has returned to normal at this time and he is having urinary frequency  · Creatinine was 1 6 at discharge and 5 2 on presentation  · Creatinine has down trended to 2 38  · Continue normal saline resuscitation  · Nephrology following

## 2022-04-03 NOTE — PLAN OF CARE
Problem: Nutrition/Hydration-ADULT  Goal: Nutrient/Hydration intake appropriate for improving, restoring or maintaining nutritional needs  Description: Monitor and assess patient's nutrition/hydration status for malnutrition  Collaborate with interdisciplinary team and initiate plan and interventions as ordered  Monitor patient's weight and dietary intake as ordered or per policy  Utilize nutrition screening tool and intervene as necessary  Determine patient's food preferences and provide high-protein, high-caloric foods as appropriate       INTERVENTIONS:  - Monitor oral intake, urinary output, labs, and treatment plans  - Assess nutrition and hydration status and recommend course of action  - Evaluate amount of meals eaten  - Assist patient with eating if necessary   - Allow adequate time for meals  - Recommend/ encourage appropriate diets, oral nutritional supplements, and vitamin/mineral supplements  - Order, calculate, and assess calorie counts as needed  - Recommend, monitor, and adjust tube feedings and TPN/PPN based on assessed needs  - Assess need for intravenous fluids  - Provide specific nutrition/hydration education as appropriate  - Include patient/family/caregiver in decisions related to nutrition  Outcome: Progressing     Problem: Potential for Falls  Goal: Patient will remain free of falls  Description: INTERVENTIONS:  - Educate patient/family on patient safety including physical limitations  - Instruct patient to call for assistance with activity   - Consult OT/PT to assist with strengthening/mobility   - Keep Call bell within reach  - Keep bed low and locked with side rails adjusted as appropriate  - Keep care items and personal belongings within reach  - Initiate and maintain comfort rounds  - Make Fall Risk Sign visible to staff  - Offer Toileting every 1 Hours, in advance of need  - Initiate/Maintain bed alarm  - Obtain necessary fall risk management equipment: yellow socks   - Apply yellow socks and bracelet for high fall risk patients  - Consider moving patient to room near nurses station  Outcome: Progressing

## 2022-04-03 NOTE — PROGRESS NOTES
[address] [address]  Progress Note Sahil Torres [birth year], 46 y o  male MRN: [id number]  Unit/Bed#: -01 Encounter: [id number]  Primary Care Provider: Matthew Clemente DO   Date and time admitted to hospital: 4/1/2022  1:14 AM    * Acute kidney injury (nontraumatic) ([facility] )  Assessment & Plan  · Likely due to increased ostomy output and dehydration  · Ostomy output has returned to normal at this time and he is having urinary frequency  · Creatinine was 1 6 at discharge and 5 2 on presentation  · Creatinine has down trended to 2 38  · Continue normal saline resuscitation  · Nephrology following      Ileus [facility])  Assessment & Plan  · Bowel movements have returned to his normal  · CT A/P (4/1): Status post colectomy with right lower quadrant ileostomy  Moderately dilated loops of small bowel slightly decreased compared to prior study   Differential considerations include ileus versus small bowel obstruction  · Initiate regular diet at this time  · He was evaluated by General surgery who has no plans for surgical intervention at this time    Morbid obesity with BMI of 40 0-44 9, adult ([facility] )  Assessment & Plan  · Recommend continued therapeutic exercise and dietary plan      VTE Pharmacologic Prophylaxis: VTE Score: 3 Moderate Risk (Score 3-4) - Pharmacological DVT Prophylaxis Contraindicated  Sequential Compression Devices Ordered  Patient Centered Rounds: I performed bedside rounds with nursing staff today  Discussions with Specialists or Other Care Team Provider: Nursing    Education and Discussions with Family / Patient: Patient asked that I updated his wife at bedside when she visits this afternoon        Time Spent for Care: 45 minutes  More than 50% of total time spent on counseling and coordination of care as described above      Current Length of Stay: 2 day(s)  Current Patient Status: Inpatient   Certification Statement: The patient will continue to require additional inpatient hospital stay due to JENAE  Discharge Plan: Anticipate discharge tomorrow to home  Code Status: Level 1 - Full Code    Subjective:   Patient is resting comfortably in bed without any acute complaints  No significant overnight events reported by nursing  Objective:     Vitals:   Temp (24hrs), Av 5 °F (36 9 °C), Min:98 4 °F (36 9 °C), Max:98 6 °F (37 °C)    Temp:  [98 4 °F (36 9 °C)-98 6 °F (37 °C)] 98 6 °F (37 °C)  HR:  [67-77] 67  Resp:  [18] 18  BP: (120-152)/(87-99) 139/90  SpO2:  [91 %-97 %] 97 %  Body mass index is 42 51 kg/m²  Input and Output Summary (last 24 hours): Intake/Output Summary (Last 24 hours) at 4/3/2022 1448  Last data filed at 4/3/2022 0900  Gross per 24 hour   Intake 300 ml   Output --   Net 300 ml       Physical Exam:   Physical Exam  Constitutional:       General: He is not in acute distress  Appearance: Normal appearance  He is obese  HENT:      Head: Normocephalic and atraumatic  Mouth/Throat:      Mouth: Mucous membranes are moist       Pharynx: Oropharynx is clear  Eyes:      Extraocular Movements: Extraocular movements intact  Conjunctiva/sclera: Conjunctivae normal    Cardiovascular:      Rate and Rhythm: Normal rate and regular rhythm  Pulses: Normal pulses  Heart sounds: Normal heart sounds  Pulmonary:      Effort: Pulmonary effort is normal  No respiratory distress  Breath sounds: Normal breath sounds  No wheezing  Abdominal:      General: Bowel sounds are normal  There is no distension  Palpations: Abdomen is soft  Tenderness: There is no abdominal tenderness  Comments: Ileostomy   Musculoskeletal:         General: Normal range of motion  Cervical back: Normal range of motion and neck supple  Skin:     General: Skin is warm and dry  Neurological:      General: No focal deficit present  Mental Status: He is alert and oriented to person, place, and time  Mental status is at baseline     Psychiatric: Mood and Affect: Mood normal          Behavior: Behavior normal          Judgment: Judgment normal        Labs:  Results from last 7 days   Lab Units 04/03/22  0618 04/02/22  0514 04/01/22  0139   WBC Thousand/uL 9 25   < > 12 20*   HEMOGLOBIN g/dL 16 0   < > 19 4*   HEMATOCRIT % 47 4   < > 55 7*   PLATELETS Thousands/uL 301   < > 407*   NEUTROS PCT %  --   --  78*   LYMPHS PCT %  --   --  11*   MONOS PCT %  --   --  9   EOS PCT %  --   --  0    < > = values in this interval not displayed  Results from last 7 days   Lab Units 04/03/22  0618 04/01/22  0956 04/01/22  0139   SODIUM mmol/L 133*   < > 124*   POTASSIUM mmol/L 3 8   < > 3 7   CHLORIDE mmol/L 99   < > 88*   CO2 mmol/L 24   < > 15*   BUN mg/dL 55*   < > 60*   CREATININE mg/dL 2 38*   < > 5 20*   ANION GAP mmol/L 10   < > 21*   CALCIUM mg/dL 9 1   < > 10 8*   ALBUMIN g/dL  --   --  5 4*   TOTAL BILIRUBIN mg/dL  --   --  0 66   ALK PHOS U/L  --   --  122*   ALT U/L  --   --  49   AST U/L  --   --  37   GLUCOSE RANDOM mg/dL 104   < > 155*    < > = values in this interval not displayed  Results from last 7 days   Lab Units 04/01/22  0139   LACTIC ACID mmol/L 1 9   PROCALCITONIN ng/ml 1 08*       Lines/Drains:  Invasive Devices  Report    Peripheral Intravenous Line            Peripheral IV 04/03/22 Left;Ventral (anterior) Forearm <1 day          Drain            Colostomy RLQ -- days              Imaging:  No new imaging      Recent Cultures (last 7 days):   Results from last 7 days   Lab Units 03/27/22  1624   C DIFF TOXIN B BY PCR  Negative       Last 24 Hours Medication List:   Current Facility-Administered Medications   Medication Dose Route Frequency Provider Last Rate    acetaminophen  650 mg Oral Q4H PRN Citlalli Higgins PA-C      heparin (porcine)  5,000 Units Subcutaneous [place], [place]      ondansetron  4 mg Intravenous Q6H PRN Citlalli Higgins PA-C      sodium chloride  100 mL/hr Intravenous Continuous Fredericka Seip,  mL/hr (04/03/22 1016)        Today, Patient Was Seen By: Neda Osei DO    **Please Note: This note may have been constructed using a voice recognition system  **

## 2022-04-04 VITALS
WEIGHT: 297.62 LBS | OXYGEN SATURATION: 94 % | RESPIRATION RATE: 18 BRPM | HEIGHT: 70 IN | BODY MASS INDEX: 42.61 KG/M2 | HEART RATE: 72 BPM | SYSTOLIC BLOOD PRESSURE: 133 MMHG | DIASTOLIC BLOOD PRESSURE: 80 MMHG | TEMPERATURE: 98.5 F

## 2022-04-04 LAB
ANION GAP SERPL CALCULATED.3IONS-SCNC: 7 MMOL/L (ref 4–13)
BUN SERPL-MCNC: 36 MG/DL (ref 5–25)
CALCIUM SERPL-MCNC: 8.7 MG/DL (ref 8.4–10.2)
CHLORIDE SERPL-SCNC: 104 MMOL/L (ref 96–108)
CO2 SERPL-SCNC: 25 MMOL/L (ref 21–32)
CREAT SERPL-MCNC: 1.77 MG/DL (ref 0.6–1.3)
ERYTHROCYTE [DISTWIDTH] IN BLOOD BY AUTOMATED COUNT: 12.9 % (ref 11.6–15.1)
GFR SERPL CREATININE-BSD FRML MDRD: 42 ML/MIN/1.73SQ M
GLUCOSE SERPL-MCNC: 94 MG/DL (ref 65–140)
HCT VFR BLD AUTO: 43.3 % (ref 36.5–49.3)
HGB BLD-MCNC: 14.5 G/DL (ref 12–17)
MCH RBC QN AUTO: 29.7 PG (ref 26.8–34.3)
MCHC RBC AUTO-ENTMCNC: 33.5 G/DL (ref 31.4–37.4)
MCV RBC AUTO: 89 FL (ref 82–98)
PLATELET # BLD AUTO: 278 THOUSANDS/UL (ref 149–390)
PMV BLD AUTO: 9.5 FL (ref 8.9–12.7)
POTASSIUM SERPL-SCNC: 3.9 MMOL/L (ref 3.5–5.3)
RBC # BLD AUTO: 4.88 MILLION/UL (ref 3.88–5.62)
SODIUM SERPL-SCNC: 136 MMOL/L (ref 135–147)
WBC # BLD AUTO: 7.83 THOUSAND/UL (ref 4.31–10.16)

## 2022-04-04 PROCEDURE — 99239 HOSP IP/OBS DSCHRG MGMT >30: CPT | Performed by: HOSPITALIST

## 2022-04-04 PROCEDURE — 80048 BASIC METABOLIC PNL TOTAL CA: CPT | Performed by: INTERNAL MEDICINE

## 2022-04-04 PROCEDURE — 85027 COMPLETE CBC AUTOMATED: CPT | Performed by: INTERNAL MEDICINE

## 2022-04-04 RX ADMIN — SODIUM CHLORIDE 100 ML/HR: 0.9 INJECTION, SOLUTION INTRAVENOUS at 06:34

## 2022-04-04 RX ADMIN — HEPARIN SODIUM 5000 UNITS: 5000 INJECTION INTRAVENOUS; SUBCUTANEOUS at 06:30

## 2022-04-04 NOTE — ASSESSMENT & PLAN NOTE
· Exact BMI 42 70  · Dietary, weight loss, and lifestyle modification counseling was provided to the patient

## 2022-04-04 NOTE — DISCHARGE SUMMARY
[address]  Discharge- Jazmín Bev , 46 y o  male MRN: [id number]  Unit/Bed#: -01 Encounter: [id number]  Primary Care Provider: Trisha Tirado DO   Date and time admitted to hospital: 2022  1:14 AM    * Acute kidney injury (nontraumatic) [facility])  Assessment & Plan  · Likely due to increased ostomy output and dehydration - patient has been set up for an outpatient GI appointment with [facility] gastroenterology to occur within the very near future  · Arrival creatinine 5 20, discharge creatinine today on 2022 1 77  · Ostomy output has returned to normal at this time and he is having urinary frequency  · Creatinine was 1 6 on previous discharge and 5 2 on presentation  · Status post treatment with IV fluids  · Status post a nephrology evaluation  · No further inpatient testing, treatment, and or workup is needed  · Patient is medically stable for discharge  · DC home today  · Patient will need periodic outpatient BMP monitoring via his primary care physician  · Patient verbalized understanding of all discharge instructions    Ileus ([facility] [address] )  Assessment & Plan  · Bowel movements have returned to his normal  · CT A/P (): Status post colectomy with right lower quadrant ileostomy  Moderately dilated loops of small bowel slightly decreased compared to prior study   Differential considerations include ileus versus small bowel obstruction     · Initiate regular diet at this time  · He was evaluated by General surgery who has no plans for surgical intervention at this time  · Patient noted to have an elevated calprotectin level highly suggestive of ongoing inflammatory disease  · Once again, patient has been set up with an outpatient appointment with GI to occur within the near future    Morbid obesity with BMI of 40 0-44 9, adult ([address] )  Assessment & Plan  · Exact BMI 42 70  · Dietary, weight loss, and lifestyle modification counseling was provided to the patient        Discharging Physician / Practitioner: Vicky Taveras MD  PCP: Bradley Rodriguez DO  Admission Date:   Admission Orders (From admission, onward)     Ordered        04/01/22 0319  Inpatient Admission  Once                      Discharge Date: 04/04/22    Medical Problems             Resolved Problems  Date Reviewed: 4/1/2022    None                Consultations During Hospital Stay:  · Nephrology  · General surgery    Procedures Performed:   · None    Significant Findings / Test Results:   · CT abdomen pelvis:-Status post colectomy with right lower quadrant ileostomy  Moderately dilated loops of small bowel slightly decreased compared to prior study   Differential considerations include ileus versus small bowel obstruction   Recommend small bowel follow-through for further evaluation  Incidental Findings:   · None     Test Results Pending at Discharge (will require follow up): · None     Outpatient Tests Requested:  · None    Complications:     None    Reason for Admission:  Acute kidney injury    Hospital Course:     Pa Shore is a 46 y o  male patient who originally presented to the hospital on 4/1/2022 due to abdominal pain, and cramping in his legs  Please refer to the initial history and physical examination completed by Lyndsey Walter for the initial presenting features and complaints  In brief, the patient is a 57-year-old male, with a longstanding history of ulcerative colitis, who is status post a colectomy with a colostomy over 10-12 years ago, and is a patient that was admitted for the 2nd time over the past week with high output from his ostomy  Both times he was diagnosed with an acute kidney injury  On this particular admission, when he came into the hospital, he was found to have a creatinine of 5 20  He was recently discharged after his creatinine was 1 6  He was treated with IV fluids    A nephrology consultation was obtained, and they followed along throughout  He had a CT scan of the abdomen and pelvis which revealed the results as outlined above  A general surgical consultation was obtained to entertain the diagnosis of a possible ileus  No surgical intervention was required  After being treated with IV fluids, the patient's kidney injury resolved, and his creatinine was back down to 1 7 on day of discharge  He was set up to follow-up with GI in the outpatient setting since he had calprotectin levels that were elevated  Additionally, he will follow-up with his PCP within 7-10 days  Please refer to the assessment/plan portion of this discharge summary as outlined above for the remainder of the details in regard to his stay  Please see above list of diagnoses and related plan for additional information  Condition at Discharge: good     Discharge Day Visit / Exam:     Subjective:  Patient seen and examined, looks and feels well, wants to go home  Vitals: Blood Pressure: 133/80 (04/04/22 0804)  Pulse: 72 (04/04/22 0804)  Temperature: 98 5 °F (36 9 °C) (04/03/22 2210)  Temp Source: Oral (04/03/22 2210)  Respirations: 18 (04/04/22 0804)  Height: 5' 10" (177 8 cm) (04/01/22 0103)  Weight - Scale: 135 kg (297 lb 9 9 oz) (04/04/22 0557)  SpO2: 94 % (04/04/22 0804)  Exam:   Physical Exam  Vitals and nursing note reviewed  Constitutional:       General: He is not in acute distress  Appearance: Normal appearance  He is not ill-appearing  HENT:      Head: Normocephalic and atraumatic  Nose: Nose normal    Eyes:      Extraocular Movements: Extraocular movements intact  Pupils: Pupils are equal, round, and reactive to light  Cardiovascular:      Rate and Rhythm: Normal rate and regular rhythm  Pulses: Normal pulses  Heart sounds: Normal heart sounds  No murmur heard  No friction rub  No gallop  Pulmonary:      Effort: Pulmonary effort is normal       Breath sounds: Normal breath sounds     Abdominal:      General: There is no distension  Palpations: Abdomen is soft  There is no mass  Tenderness: There is no abdominal tenderness  There is no guarding or rebound  Comments: Ostomy is in place   Musculoskeletal:         General: No swelling or tenderness  Normal range of motion  Cervical back: Normal range of motion and neck supple  No rigidity  No muscular tenderness  Right lower leg: No edema  Left lower leg: No edema  Skin:     General: Skin is warm  Capillary Refill: Capillary refill takes less than 2 seconds  Findings: No erythema or rash  Neurological:      General: No focal deficit present  Mental Status: He is alert and oriented to person, place, and time  Mental status is at baseline  Psychiatric:         Mood and Affect: Mood normal          Behavior: Behavior normal          Discussion with Family:  No family members were present at the time of my discharge evaluation, nor did the patient ask and or want me to call anyone    Discharge instructions/Information to patient and family:   See after visit summary for information provided to patient and family  Provisions for Follow-Up Care:  See after visit summary for information related to follow-up care and any pertinent home health orders  Disposition:     Home      Planned Readmission:    None     Discharge Statement:  I spent 45 minutes discharging the patient  This time was spent on the day of discharge  I had direct contact with the patient on the day of discharge  Greater than 50% of the total time was spent examining patient, answering all patient questions, arranging and discussing plan of care with patient as well as directly providing post-discharge instructions  Additional time then spent on discharge activities  Discharge Medications:  See after visit summary for reconciled discharge medications provided to patient and family        ** Please Note: This note has been constructed using a voice recognition system **

## 2022-04-04 NOTE — NURSING NOTE
Patient medically cleared to be DC home today, IV removed, Dc instructions reviewed with patient, pt educated on the importance of following up with all MD appointments  pt verbalized understanding   Wife at bedside

## 2022-04-04 NOTE — ASSESSMENT & PLAN NOTE
· Likely due to increased ostomy output and dehydration - patient has been set up for an outpatient GI appointment with [address] [place] [address] gastroenterology to occur within the very near future  · Arrival creatinine 5 20, discharge creatinine today on 04/04/2022 1 77  · Ostomy output has returned to normal at this time and he is having urinary frequency  · Creatinine was 1 6 on previous discharge and 5 2 on presentation  · Status post treatment with IV fluids  · Status post a nephrology evaluation  · No further inpatient testing, treatment, and or workup is needed  · Patient is medically stable for discharge  · DC home today  · Patient will need periodic outpatient BMP monitoring via his primary care physician  · Patient verbalized understanding of all discharge instructions

## 2022-04-05 ENCOUNTER — TELEPHONE (OUTPATIENT)
Dept: GASTROENTEROLOGY | Facility: CLINIC | Age: 52
End: 2022-04-05

## 2022-04-05 NOTE — TELEPHONE ENCOUNTER
----- Message from Fanta Lacey PA-C sent at 4/4/2022 11:49 AM EDT -----  Hello! I am covering for Myra this week at [place]  This patient is likely to be discharged soon but needs appointment with Dr Real Mejia at [facility] office or Dr Connie Multani at [facility] ASAP  Patient really should be seen within 2-3 weeks the latest so please try to squeeze him in to one of their schedules  Thank you!

## 2022-04-05 NOTE — TELEPHONE ENCOUNTER
Pt called in stating that he wants to speak and schedule with his primary first and then he will call to schedule with us  Requesting to not call him for scheduling, he will reach out to us

## 2022-04-05 NOTE — UTILIZATION REVIEW
Notification of Discharge   This is a Notification of Discharge from our facility [address]  Please be advised that this patient has been discharge from our facility  Below you will find the admission and discharge date and time including the patients disposition  UTILIZATION REVIEW CONTACT:  Arik Campbell  Utilization   Network Utilization Review Department  Phone: [phone] carefully listen to the prompts  All voicemails are confidential   Email: [email or web address]     PHYSICIAN ADVISORY SERVICES:  FOR [id number] REVIEW - MEDICAL NECESSITY DENIAL  Phone: [phone]  Fax: [phone]  Email: [email or web address]     PRESENTATION DATE: 4/1/2022  1:14 AM  OBERVATION ADMISSION DATE:   INPATIENT ADMISSION DATE: 4/1/22  3:19 AM   DISCHARGE DATE: 4/4/2022  2:03 PM  DISPOSITION: Home/Self Care Home/Self Care      IMPORTANT INFORMATION:  Send all requests for admission clinical reviews, approved or denied determinations and any other requests to dedicated fax number below belonging to the campus where the patient is receiving treatment   List of dedicated fax numbers:  [address] DENIALS (Administrative/Medical Necessity) [phone]   [address] (Maternity/NICU/Pediatrics) [phone]   [facility] [phone]   [address] [place] [address] [phone]   [address] [place] [address] [phone]   [address] [phone]   [facility]  [phone]   [address] [facility] [address]  [address] [facility] 1000 [facility] [phone]

## 2022-04-14 LAB
ATRIAL RATE: 91 BPM
P AXIS: 45 DEGREES
PR INTERVAL: 176 MS
QRS AXIS: -85 DEGREES
QRSD INTERVAL: 98 MS
QT INTERVAL: 352 MS
QTC INTERVAL: 432 MS
T WAVE AXIS: 34 DEGREES
VENTRICULAR RATE: 91 BPM

## 2022-04-14 PROCEDURE — 93010 ELECTROCARDIOGRAM REPORT: CPT | Performed by: INTERNAL MEDICINE

## 2022-06-02 ENCOUNTER — CONSULT (OUTPATIENT)
Dept: NEPHROLOGY | Facility: CLINIC | Age: 52
End: 2022-06-02
Payer: COMMERCIAL

## 2022-06-02 VITALS
HEART RATE: 78 BPM | DIASTOLIC BLOOD PRESSURE: 82 MMHG | SYSTOLIC BLOOD PRESSURE: 122 MMHG | WEIGHT: 303 LBS | BODY MASS INDEX: 43.38 KG/M2 | OXYGEN SATURATION: 99 % | HEIGHT: 70 IN

## 2022-06-02 DIAGNOSIS — E87.1 HYPONATREMIA: ICD-10-CM

## 2022-06-02 DIAGNOSIS — N17.9 ACUTE KIDNEY INJURY (NONTRAUMATIC) (HCC): Primary | ICD-10-CM

## 2022-06-02 DIAGNOSIS — K56.7 ILEUS (HCC): ICD-10-CM

## 2022-06-02 DIAGNOSIS — N18.31 STAGE 3A CHRONIC KIDNEY DISEASE (HCC): ICD-10-CM

## 2022-06-02 LAB
SL AMB  POCT GLUCOSE, UA: NEGATIVE
SL AMB LEUKOCYTE ESTERASE,UA: NEGATIVE
SL AMB POCT BILIRUBIN,UA: NEGATIVE
SL AMB POCT BLOOD,UA: ABNORMAL
SL AMB POCT CLARITY,UA: CLEAR
SL AMB POCT COLOR,UA: ABNORMAL
SL AMB POCT KETONES,UA: NEGATIVE
SL AMB POCT NITRITE,UA: NEGATIVE
SL AMB POCT PH,UA: 6
SL AMB POCT SPECIFIC GRAVITY,UA: 1.03
SL AMB POCT URINE PROTEIN: NEGATIVE
SL AMB POCT UROBILINOGEN: 0.2

## 2022-06-02 PROCEDURE — 81002 URINALYSIS NONAUTO W/O SCOPE: CPT | Performed by: INTERNAL MEDICINE

## 2022-06-02 PROCEDURE — 99214 OFFICE O/P EST MOD 30 MIN: CPT | Performed by: INTERNAL MEDICINE

## 2022-06-02 NOTE — ASSESSMENT & PLAN NOTE
Lab Results   Component Value Date    EGFR 42 04/04/2022    EGFR 29 04/03/2022    EGFR 18 04/02/2022    CREATININE 1 77 (H) 04/04/2022    CREATININE 2 38 (H) 04/03/2022    CREATININE 3 51 (H) 04/02/2022     He appears to have stable stage III chronic kidney disease   Explained the meaning of creatinine and GFR to many understood completely  He has a normal kidney ultrasound  His UA is negative for protein but we do not have microalbumin to creatinine ratios   Etiology is likely hemodynamic based on volume shifts due to altering ileostomy outputs  He may have glomerular megaly with development of focal glomerulosclerosis due to morbid obesity      He has really taken a new tackic in this and has lost 24 lb with a health   Will check urine microalbumin to creatinine and urine protein to creatinine ratio   Will review check chemistries as  data was from  April

## 2022-06-02 NOTE — PROGRESS NOTES
[address] [facility], [place]    Name: Samantha Nj  YOB: 1970      Assessment/Plan:           Problem List Items Addressed This Visit    None           Subjective:      Patient ID: Samantha Nj is a 46 y o  male  referred by Amy Huang    HPI  He had been admitted with acute kidney injury due to increased ostomy output volume depletion  On admission his creatinine was 5 2 mg/dL  He had improvement with to a discharge creatinine of 1 77 mg/dL he had an ileus but prior to discharge his bowel movements have returned to normal   CT dated 04/01/2022 demonstrated status post colectomy with right lower quadrant ileostomy  He had moderately dilated loops of small bowel is slightly decreased prior to his compared to the prior study  He has a history of ulcerative colitis He had an ileostomy placed in 2005  He takes no medications and does nto use NSAIDs  No FH of kidney disease  His baseline creatinine was 1 3-1 6 mg/dl  He had a normal kidney ultrsound in Feb 2022    The following portions of the patient's history were reviewed and updated as appropriate: allergies, current medications, past family history, past medical history, past social history, past surgical history and problem list     Review of Systems   Constitutional: Negative for fatigue  HENT: Negative for hearing loss  Eyes:        Has eye irritation end of day   Respiratory: Negative for cough and shortness of breath  Cardiovascular: Positive for leg swelling  Negative for chest pain and palpitations  Gets RLE cellulitis at times   Gastrointestinal:        Has RLQ ileostomy   Genitourinary: Negative for decreased urine volume, difficulty urinating, hematuria and urgency  Trying to drink more water and can get dehydrated  Easily  Urine is clear   Musculoskeletal: Negative for arthralgias, back pain and gait problem     Skin:        Has had RLE cellulitis in the past Neurological: Negative for dizziness, tremors, light-headedness and headaches  Hematological: Does not bruise/bleed easily  Psychiatric/Behavioral: Negative for dysphoric mood  Social History     Socioeconomic History    Marital status: /Civil Union     Spouse name: None    Number of children: None    Years of education: None    Highest education level: None   Occupational History    None   Tobacco Use    Smoking status: Never Smoker    Smokeless tobacco: Never Used   Vaping Use    Vaping Use: None   Substance and Sexual Activity    Alcohol use: Not Currently     Comment: social    Drug use: Never    Sexual activity: Yes     Partners: Female   Other Topics Concern    None   Social History Narrative    None     Social Determinants of Health     Financial Resource Strain: Not on file   Food Insecurity: No Food Insecurity    Worried About Running Out of Food in the Last Year: Never true    Bill of Food in the Last Year: Never true   Transportation Needs: No Transportation Needs    Lack of Transportation (Medical): No    Lack of Transportation (Non-Medical): No   Physical Activity: Not on file   Stress: Not on file   Social Connections: Not on file   Intimate Partner Violence: Not on file   Housing Stability: Low Risk     Unable to Pay for Housing in the Last Year: No    Number of Places Lived in the Last Year: 1    Unstable Housing in the Last Year: No     Past Medical History:   Diagnosis Date    [facility] ([facility] )      Past Surgical History:   Procedure Laterality Date    COLOSTOMY  2005    ILEOSTOMY Right 2007     No current outpatient medications on file      Lab Results   Component Value Date    SODIUM 136 04/04/2022    K 3 9 04/04/2022     04/04/2022    CO2 25 04/04/2022    AGAP 7 04/04/2022    BUN 36 (H) 04/04/2022    CREATININE 1 77 (H) 04/04/2022    GLUC 94 04/04/2022    GLUF 125 (H) 03/28/2022    CALCIUM 8 7 04/04/2022    AST 37 04/01/2022    ALT 49 04/01/2022    ALKPHOS 122 (H) 04/01/2022    TP 9 9 (H) 04/01/2022    TBILI 0 66 04/01/2022    EGFR 42 04/04/2022     Lab Results   Component Value Date    WBC 7 83 04/04/2022    HGB 14 5 04/04/2022    HCT 43 3 04/04/2022    MCV 89 04/04/2022     04/04/2022     No results found for: CHOLESTEROL  No results found for: HDL  No results found for: LDLCALC  No results found for: TRIG  No results found for: [place], [place]  Lab Results   Component Value Date    GZJ0HQWJJJCZ 1 339 04/01/2022     Lab Results   Component Value Date    CALCIUM 8 7 04/04/2022    PHOS 4 1 03/28/2022     No results found for: SPEP, UPEP  No results found for: MICROALCHON [id number]        Objective:      /82 (BP Location: Left arm, Patient Position: Sitting, Cuff Size: Large)   Pulse 78   Ht 5' 10" (1 778 m)   Wt (!) 137 kg (303 lb)   SpO2 99%   BMI 43 48 kg/m²          Physical Exam  Vitals reviewed  Constitutional:       General: He is not in acute distress  Appearance: He is obese  He is not toxic-appearing  HENT:      Head: Normocephalic and atraumatic  Right Ear: External ear normal       Left Ear: External ear normal    Eyes:      Extraocular Movements: Extraocular movements intact  Conjunctiva/sclera: Conjunctivae normal       Pupils: Pupils are equal, round, and reactive to light  Neck:      Vascular: No carotid bruit  Cardiovascular:      Rate and Rhythm: Normal rate and regular rhythm  Pulmonary:      Effort: Pulmonary effort is normal  No respiratory distress  Breath sounds: Normal breath sounds  No wheezing or rales  Abdominal:      General: Bowel sounds are normal       Palpations: Abdomen is soft  Comments: ileostomy   Musculoskeletal:         General: Normal range of motion  Cervical back: Normal range of motion and neck supple  Right lower leg: No edema  Left lower leg: No edema  Lymphadenopathy:      Cervical: No cervical adenopathy     Skin:     General: Skin is warm and dry  Neurological:      General: No focal deficit present  Mental Status: He is alert  Mental status is at baseline  Psychiatric:         Mood and Affect: Mood normal          Behavior: Behavior normal          Thought Content:  Thought content normal          Judgment: Judgment normal

## 2022-06-20 DIAGNOSIS — E55.9 VITAMIN D DEFICIENCY: Primary | ICD-10-CM

## 2022-06-20 RX ORDER — ERGOCALCIFEROL (VITAMIN D2) 1250 MCG
50000 CAPSULE ORAL WEEKLY
Qty: 12 CAPSULE | Refills: 1 | Status: SHIPPED | OUTPATIENT
Start: 2022-06-20

## 2022-08-08 NOTE — DISCHARGE INSTR - AVS FIRST PAGE
Dear Robinson Owusu,     It was our pleasure to care for you here at [facility], [address] [facility]  It is our hope that we were always able to exceed the expected standards for your care during your stay  You were hospitalized due to dehydration and renal injury  You were cared for on the medical/surgical floor by Bernabe Coppola MD with the [facility] who covers for your primary care physician (PCP), Vivek Haddad DO, while you were hospitalized  If you have any questions or concerns related to this hospitalization, you may contact us at [phone]  For follow up as well as any medication refills, we recommend that you follow up with your primary care physician  A registered nurse will reach out to you by phone within a few days after your discharge to answer any additional questions that you may have after going home  However, at this time we provide for you here, the most important instructions / recommendations at discharge:     Notable Medication Adjustments -   Okay to resume any in all pre-admission medications at the preadmission dosages  Testing Required after Discharge -   To be further determined in the outpatient setting by your primary care provider, and or by Gastroenterology  Important follow up information -   Please follow-up with the providers as outlined in this discharge package  Other Instructions -   Please keep yourself well hydrated  Please ensure that you have adequate amounts of fiber in your daily diet  Please ensure that your family physician orders routine post hospital discharge blood work in 7-10 days  Please review this entire after visit summary as additional general instructions including medication list, appointments, activity, diet, any pertinent wound care, and other additional recommendations from your care team that may be provided for you        Sincerely,     Bernabe Coppola MD Self

## 2022-10-11 PROBLEM — R65.20 SEPSIS WITH ACUTE RENAL FAILURE WITHOUT SEPTIC SHOCK (HCC): Status: RESOLVED | Noted: 2022-03-27 | Resolved: 2022-10-11

## 2022-10-11 PROBLEM — A41.9 SEPSIS WITH ACUTE RENAL FAILURE WITHOUT SEPTIC SHOCK (HCC): Status: RESOLVED | Noted: 2022-03-27 | Resolved: 2022-10-11

## 2022-10-11 PROBLEM — N17.9 SEPSIS WITH ACUTE RENAL FAILURE WITHOUT SEPTIC SHOCK (HCC): Status: RESOLVED | Noted: 2022-03-27 | Resolved: 2022-10-11

## 2022-10-25 ENCOUNTER — APPOINTMENT (EMERGENCY)
Dept: RADIOLOGY | Facility: HOSPITAL | Age: 52
End: 2022-10-25
Payer: COMMERCIAL

## 2022-10-25 ENCOUNTER — HOSPITAL ENCOUNTER (EMERGENCY)
Facility: HOSPITAL | Age: 52
Discharge: HOME | End: 2022-10-25
Attending: EMERGENCY MEDICINE
Payer: COMMERCIAL

## 2022-10-25 VITALS
SYSTOLIC BLOOD PRESSURE: 170 MMHG | HEART RATE: 80 BPM | RESPIRATION RATE: 16 BRPM | DIASTOLIC BLOOD PRESSURE: 107 MMHG | TEMPERATURE: 98.7 F | OXYGEN SATURATION: 98 %

## 2022-10-25 DIAGNOSIS — M10.9 GOUT: Primary | ICD-10-CM

## 2022-10-25 PROCEDURE — 73630 X-RAY EXAM OF FOOT: CPT

## 2022-10-25 RX ORDER — PREDNISONE 20 MG/1
40 TABLET ORAL ONCE
Status: COMPLETED | OUTPATIENT
Start: 2022-10-25 | End: 2022-10-25

## 2022-10-25 RX ORDER — METHYLPREDNISOLONE 4 MG/1
TABLET ORAL
Qty: 21 TABLET | Refills: 0 | Status: SHIPPED | OUTPATIENT
Start: 2022-10-25

## 2022-10-25 RX ADMIN — PREDNISONE 40 MG: 20 TABLET ORAL at 11:51

## 2022-10-25 NOTE — ED PROVIDER NOTES
History  Chief Complaint   Patient presents with   • Foot Pain     Right foot pain after helping daughter with her tire  78-year-old male presents to the emergency department seeking evaluation for right foot pain  The pain is located base of the right great toe  Patient believes he may have aggravated his pain or injured the toe last evening while helping his daughter change a tire on her car  There is no erythema or deformity  No gross signs of trauma to the foot or toe  He denies fevers chills chest pain shortness of breath  He is otherwise well-appearing  He reports the area of concern is extremely tender to even light touch  Allergies reviewed          Prior to Admission Medications   Prescriptions Last Dose Informant Patient Reported? Taking?   ergocalciferol (Drisdol) 1 25 MG (41346 UT) capsule   No No   Sig: Take 1 capsule (50,000 Units total) by mouth once a week      Facility-Administered Medications: None       Past Medical History:   Diagnosis Date   • Ulcerative colitis ([facility] [address] )        Past Surgical History:   Procedure Laterality Date   • COLOSTOMY  2005   • ILEOSTOMY Right 2007       Family History   Problem Relation Age of Onset   • Alcohol abuse Mother    • Alzheimer's disease Father    • Cancer Sister      I have reviewed and agree with the history as documented  E-Cigarette/Vaping     E-Cigarette/Vaping Substances   • Nicotine No    • THC No    • CBD No    • Flavoring No    • Other No    • Unknown No      Social History     Tobacco Use   • Smoking status: Never Smoker   • Smokeless tobacco: Never Used   Substance Use Topics   • Alcohol use: Not Currently     Comment: social   • Drug use: Never       Review of Systems   HENT: Negative for dental problem, facial swelling, hearing loss and tinnitus  Eyes: Negative for pain and visual disturbance  Respiratory: Negative for chest tightness and shortness of breath  Cardiovascular: Negative for chest pain     Gastrointestinal: Negative for abdominal pain  Musculoskeletal: Positive for arthralgias  Negative for back pain and neck pain  Neurological: Negative for dizziness and headaches  All other systems reviewed and are negative  Physical Exam  Physical Exam  Vitals and nursing note reviewed  Constitutional:       Appearance: He is well-developed  HENT:      Head: Normocephalic and atraumatic  Eyes:      Conjunctiva/sclera: Conjunctivae normal    Cardiovascular:      Rate and Rhythm: Normal rate and regular rhythm  Heart sounds: No murmur heard  Pulmonary:      Effort: Pulmonary effort is normal  No respiratory distress  Breath sounds: Normal breath sounds  Abdominal:      Palpations: Abdomen is soft  Tenderness: There is no abdominal tenderness  Musculoskeletal:      Cervical back: Neck supple  Legs:       Comments: Tenderness to the base of the right great toe  No deformity gross sign of injury  Skin:     General: Skin is warm and dry  Neurological:      Mental Status: He is alert  Vital Signs  ED Triage Vitals [10/25/22 1018]   Temperature Pulse Respirations Blood Pressure SpO2   98 7 °F (37 1 °C) 80 16 (!) 170/107 98 %      Temp Source Heart Rate Source Patient Position - Orthostatic VS BP Location FiO2 (%)   Tympanic Monitor Sitting Right arm --      Pain Score       --           Vitals:    10/25/22 1018   BP: (!) 170/107   Pulse: 80   Patient Position - Orthostatic VS: Sitting         Visual Acuity      ED Medications  Medications   predniSONE tablet 40 mg (40 mg Oral Given 10/25/22 1151)       Diagnostic Studies  Results Reviewed     None                 XR foot 3+ views RIGHT   Final Result by Roni Brooks MD (10/25 1608)      Soft tissue swelling  No acute osseous abnormality        Workstation performed: [id number]                    Procedures  Procedures         ED Course                                             MDM  Number of Diagnoses or Management Options  Gout  Diagnosis management comments: Given the lack of trauma and normal appearing x-ray of the foot and toe I suspect the patient's symptoms are likely explained by a gout flare  Will treat with steroids  Patient educated regarding their diagnosis and given return and follow-up instructions  Patient was advised to returned to the ED with worsening symptoms or concerns  Patient is understanding of and in agreement with the treatment plan  There are no questions at the time of discharge  Amount and/or Complexity of Data Reviewed  Clinical lab tests: ordered and reviewed  Tests in the radiology section of CPT®: ordered and reviewed    Risk of Complications, Morbidity, and/or Mortality  Presenting problems: moderate  Diagnostic procedures: low  Management options: low    Patient Progress  Patient progress: stable      Disposition  Final diagnoses:   Gout     Time reflects when diagnosis was documented in both MDM as applicable and the Disposition within this note     Time User Action Codes Description Comment    10/25/2022 11:45 AM Sofia Low [M10 9] Gout       ED Disposition     ED Disposition   Discharge    Condition   Stable    Date/Time   Tue Oct 25, 2022 11:45 AM    Comment   [address] discharge to home/self care  Follow-up Information     Follow up With Specialties Details Why [address], DO Internal Medicine   Phi  [facility] [facility]  [phone]            Discharge Medication List as of 10/25/2022 11:46 AM      START taking these medications    Details   methylPREDNISolone 4 MG tablet therapy pack Use as directed on package, Normal         CONTINUE these medications which have NOT CHANGED    Details   ergocalciferol (Drisdol) 1 25 MG (65079 UT) capsule Take 1 capsule (50,000 Units total) by mouth once a week, Starting Mon 6/20/2022, Normal             No discharge procedures on file      PDMP Review     None ED Provider  Electronically Signed by           Isaías Carey PA-C  10/27/22 6529

## 2022-11-16 NOTE — H&P
Ba 43  H&P- Stephanie Wheatley [birth year], 46 y o  male MRN: [id number]  Unit/Bed#: ED 24 Encounter: [id number]  Primary Care Provider: Marielena Menjivar DO   Date and time admitted to hospital: 3/27/2022  4:26 AM    * Sepsis with acute renal failure without septic shock [facility])  Assessment & Plan  · Admit to med surge observation  · Patient met sepsis criteria at time of arrival with a white blood cell count of 21 03, a pulse of 109  · Most likely secondary to an intra-abdominal infection-gastritis for his gastroenteritis  · Procalcitonin testing is elevated at 0 67  · Will make the patient NPO, give IV fluids, start treatment with IV Zosyn, follow-up on the blood cultures  · Will consult Gastroenterology  · Acute kidney injury:- present on admission  · Arrival creatinine 2 14 - suspect that this is pre renal secondary to severe dehydration, patient also noted to have a hemoglobin of 18 4 with a hematocrit of 56 7  · Acute tubular necrosis secondary to sepsis and infection is also amidst the differential  · Start normal saline at 125 cc an hour, if no improvement in renal function over the next 24 hours, the patient will need a nephrology evaluation  At that point we will embark off on a full-blown renal workup if necessary  · Hyponatremia:- arrival sodium is 134, most likely a hypovolemic hyponatremia, will re-evaluate after treatment with normal saline  · Repeat blood work in the a   Intra-abdominal infection  Assessment & Plan  · CT abdomen with the following results:-Status post colectomy with right lower quadrant ileostomy  Moderately distended stomach   Multiple dilated loops of small bowel are noted   There is relative underdistention of the small bowel loops in the right lower quadrant and in the ostomy   Differential considerations include ileus versus small bowel obstruction, correlation with the patient's symptoms recommended   Cholelithiasis without discrete evidence of acute cholecystitis, left renal atrophy and left-sided nephrolithiasis; no hydronephrosis, and other findings as above  · Patient has a history of ulcerative colitis in the past  · Will formally consult GI        Morbid obesity with BMI of 40 0-44 9, adult ([facility] )  Assessment & Plan  · Actual BMI 43 05  · Dietary, weight loss, and lifestyle modification counseling was provided      VTE Prophylaxis: Enoxaparin (Lovenox)  Code Status:  Full code status level 1  POLST: POLST is not applicable to this patient  Discussion with family:  No family members present at the time of my H&P evaluation, patient reported that he would bring his wife up to par via cell phone and did not require for me to call her    Anticipated Length of Stay:  Patient will be admitted on an Observation basis with an anticipated length of stay of  < 2 midnights  Justification for Hospital Stay:  Need for IV fluids, and IV antibiotics    Chief Complaint:   Nausea, vomiting, and high output from his colostomy x1 day    History of Present Illness:    Char Aburto is a 46 y o  male who presents with the aforementioned chief complaint  In brief, the patient is an extremely pleasant 49-year-old male, who reports that he was in his usual state of health up until approximately Friday night  The patient has a history of ulcerative colitis and is status post a colectomy and a colostomy approximately 15 years ago  He currently does not follow with any GI physician  He follows with his regular PCP  He states on Thursday night that he prepared a shrimp related dish  He did not like the taste of this shrimp, and subsequently discarded the remaining shrimp  On Friday, he noticed that his colostomy bag was filling up more than usual   By Saturday evening, where the patient reported having 4-5 episodes of bilious vomiting  He denies any hematemesis  He had some abdominal discomfort, but did not report any abdominal pain    He reports some abdominal cramping  He had a horrible night overnight Saturday night, and his wife convinced him to come in to seek medical attention here in the hospital   He reports feeling a little feverish overnight  He denies any chills  Patient denies chest pain, numbness, tingling, and weakness otherwise  Of note the patient is fully vaccinated against COVID-19  After coming into the emergency room, he was diagnosed with a gastroenteritis, and an acute kidney injury, hence the reason for admission  Review of Systems:  Review of Systems   Constitutional: Positive for fatigue and fever  Gastrointestinal: Positive for abdominal distention, abdominal pain, nausea and vomiting  All other systems reviewed and are negative  Past Medical and Surgical History:   Past Medical History:   Diagnosis Date    Ulcerative colitis ([facility] [address] )        Past Surgical History:   Procedure Laterality Date    COLOSTOMY      COLOSTOMY         Meds/Allergies:  Prior to Admission medications    Medication Sig Start Date End Date Taking? Authorizing Provider   erythromycin [facility]) ophthalmic ointment Administer 0 5 inches into the left eye every 4 (four) hours  Patient not taking: Reported on 8/16/2020 4/30/18   ANITA Patel     I have reviewed home medications with patient personally      Allergies: No Known Allergies    Social History:  Marital Status: /Civil Union   Occupation:  Works at the [employer]  Patient Pre-hospital Living Situation:  Patient lives at home with his wife  Patient Pre-hospital Level of Mobility:  Independent  Patient Pre-hospital Diet Restrictions:  None  Substance Use History:  Denies tobacco, and recreational drug use, admits to an occasional alcoholic beverage with pizza night  Social History     Substance and Sexual Activity   Alcohol Use Not Currently     Social History     Tobacco Use   Smoking Status Never Smoker   Smokeless Tobacco Never Used     Social History     Substance and Sexual Activity   Drug Use Never       Family History:  I have reviewed the patients family history - patient reports that his sister has thyroid issues, denies any early onset diabetes, cancer, stroke, and or heart disease in immediate family members inclusive of his parents    Physical Exam:   Vitals:   Blood Pressure: 142/96 (03/27/22 0428)  Pulse: 100 (03/27/22 0537)  Temperature: 97 8 °F (36 6 °C) (03/27/22 0428)  Temp Source: Temporal (03/27/22 0428)  Respirations: 20 (03/27/22 0428)  Height: 5' 10" (177 8 cm) (03/27/22 0428)  Weight - Scale: 136 kg (300 lb) (03/27/22 0428)  SpO2: 91 % (03/27/22 0537)    Physical Exam  Vitals and nursing note reviewed  Constitutional:       General: He is not in acute distress  Appearance: Normal appearance  He is not ill-appearing  HENT:      Head: Normocephalic and atraumatic  Nose: Nose normal    Eyes:      Extraocular Movements: Extraocular movements intact  Pupils: Pupils are equal, round, and reactive to light  Cardiovascular:      Rate and Rhythm: Normal rate and regular rhythm  Pulses: Normal pulses  Heart sounds: Normal heart sounds  No murmur heard  No friction rub  No gallop  Pulmonary:      Effort: Pulmonary effort is normal       Breath sounds: Normal breath sounds  Abdominal:      General: There is no distension  Palpations: Abdomen is soft  There is no mass  Tenderness: There is no abdominal tenderness  There is no guarding or rebound  Comments: Well-healed midline abdominal scar noted  Lower colostomy bag in place  Bowel sounds normoactive x4 quadrants  Otherwise soft, nontender, nondistended   Musculoskeletal:         General: No swelling or tenderness  Normal range of motion  Cervical back: Normal range of motion and neck supple  No rigidity  No muscular tenderness  Right lower leg: No edema  Left lower leg: No edema  Skin:     General: Skin is warm        Capillary Refill: Capillary refill takes less than 2 seconds  Findings: No erythema or rash  Neurological:      General: No focal deficit present  Mental Status: He is alert and oriented to person, place, and time  Mental status is at baseline  Psychiatric:         Mood and Affect: Mood normal          Behavior: Behavior normal          Additional Data:   Lab Results: I have personally reviewed pertinent reports  Results from last 7 days   Lab Units 03/27/22  0448   WBC Thousand/uL 21 03*   HEMOGLOBIN g/dL 18 4*   HEMATOCRIT % 56 7*   PLATELETS Thousands/uL 381   NEUTROS PCT % 90*   LYMPHS PCT % 3*   MONOS PCT % 6   EOS PCT % 0     Results from last 7 days   Lab Units 03/27/22  0448   SODIUM mmol/L 134*   POTASSIUM mmol/L 4 6   CHLORIDE mmol/L 99   CO2 mmol/L 21   BUN mg/dL 27*   CREATININE mg/dL 2 14*   CALCIUM mg/dL 10 8*   ALK PHOS U/L 100   ALT U/L 22   AST U/L 22     Results from last 7 days   Lab Units 03/27/22  0448   INR  1 08               Imaging: I have personally reviewed pertinent reports  CT abdomen pelvis wo contrast   Final Result by Alida Allen DO (03/27 7351)      Status post colectomy with right lower quadrant ileostomy  Moderately distended stomach  Multiple dilated loops of small bowel are noted  There is relative underdistention of the small bowel loops in the right lower quadrant and in the ostomy  Differential considerations include ileus versus small bowel    obstruction, correlation with the patient's symptoms recommended  Cholelithiasis without discrete evidence of acute cholecystitis, left renal atrophy and left-sided nephrolithiasis; no hydronephrosis, and other findings as above  Workstation performed: [id number]             EKG, Pathology, and Other Studies Reviewed on Admission:   · EKG:  None reviewed    Epic Records Reviewed: Yes     ** Please Note: This note has been constructed using a voice recognition system   ** Olanzapine Pregnancy And Lactation Text: This medication is pregnancy category C.   There are no adequate and well controlled trials with olanzapine in pregnant females.  Olanzapine should be used during pregnancy only if the potential benefit justifies the potential risk to the fetus.   In a study in lactating healthy women, olanzapine was excreted in breast milk.  It is recommended that women taking olanzapine should not breast feed.

## 2022-11-17 ENCOUNTER — TELEPHONE (OUTPATIENT)
Dept: NEPHROLOGY | Facility: CLINIC | Age: 52
End: 2022-11-17

## 2022-11-23 ENCOUNTER — APPOINTMENT (OUTPATIENT)
Dept: LAB | Facility: CLINIC | Age: 52
End: 2022-11-23

## 2022-11-23 DIAGNOSIS — N18.31 STAGE 3A CHRONIC KIDNEY DISEASE (HCC): ICD-10-CM

## 2022-11-23 DIAGNOSIS — N17.9 ACUTE KIDNEY INJURY (NONTRAUMATIC) (HCC): ICD-10-CM

## 2022-11-23 LAB
25(OH)D3 SERPL-MCNC: 20.2 NG/ML (ref 30–100)
ALBUMIN SERPL BCP-MCNC: 3.6 G/DL (ref 3.5–5)
ALP SERPL-CCNC: 79 U/L (ref 46–116)
ALT SERPL W P-5'-P-CCNC: 21 U/L (ref 12–78)
ANION GAP SERPL CALCULATED.3IONS-SCNC: 6 MMOL/L (ref 4–13)
AST SERPL W P-5'-P-CCNC: 16 U/L (ref 5–45)
BACTERIA UR QL AUTO: ABNORMAL /HPF
BILIRUB SERPL-MCNC: 0.45 MG/DL (ref 0.2–1)
BILIRUB UR QL STRIP: NEGATIVE
BUN SERPL-MCNC: 20 MG/DL (ref 5–25)
CALCIUM SERPL-MCNC: 9.4 MG/DL (ref 8.3–10.1)
CHLORIDE SERPL-SCNC: 110 MMOL/L (ref 96–108)
CLARITY UR: CLEAR
CO2 SERPL-SCNC: 24 MMOL/L (ref 21–32)
COLOR UR: ABNORMAL
CREAT SERPL-MCNC: 1.45 MG/DL (ref 0.6–1.3)
CREAT UR-MCNC: 183 MG/DL
CREAT UR-MCNC: 183 MG/DL
GFR SERPL CREATININE-BSD FRML MDRD: 54 ML/MIN/1.73SQ M
GLUCOSE P FAST SERPL-MCNC: 115 MG/DL (ref 65–99)
GLUCOSE UR STRIP-MCNC: NEGATIVE MG/DL
HGB UR QL STRIP.AUTO: ABNORMAL
KETONES UR STRIP-MCNC: NEGATIVE MG/DL
LEUKOCYTE ESTERASE UR QL STRIP: NEGATIVE
MICROALBUMIN UR-MCNC: 11.9 MG/L (ref 0–20)
MICROALBUMIN/CREAT 24H UR: 7 MG/G CREATININE (ref 0–30)
MUCOUS THREADS UR QL AUTO: ABNORMAL
NITRITE UR QL STRIP: NEGATIVE
NON-SQ EPI CELLS URNS QL MICRO: ABNORMAL /HPF
PH UR STRIP.AUTO: 5.5 [PH]
POTASSIUM SERPL-SCNC: 4.4 MMOL/L (ref 3.5–5.3)
PROT SERPL-MCNC: 8.1 G/DL (ref 6.4–8.4)
PROT UR STRIP-MCNC: NEGATIVE MG/DL
PROT UR-MCNC: 16 MG/DL
PROT/CREAT UR: 0.09 MG/G{CREAT} (ref 0–0.1)
RBC #/AREA URNS AUTO: ABNORMAL /HPF
SODIUM SERPL-SCNC: 140 MMOL/L (ref 135–147)
SP GR UR STRIP.AUTO: 1.02 (ref 1–1.03)
URATE SERPL-MCNC: 9.1 MG/DL (ref 3.5–8.5)
UROBILINOGEN UR STRIP-ACNC: <2 MG/DL
WBC #/AREA URNS AUTO: ABNORMAL /HPF

## 2023-01-05 ENCOUNTER — OFFICE VISIT (OUTPATIENT)
Dept: NEPHROLOGY | Facility: CLINIC | Age: 53
End: 2023-01-05

## 2023-01-05 VITALS
BODY MASS INDEX: 44.84 KG/M2 | OXYGEN SATURATION: 99 % | SYSTOLIC BLOOD PRESSURE: 172 MMHG | HEIGHT: 70 IN | DIASTOLIC BLOOD PRESSURE: 100 MMHG | HEART RATE: 98 BPM | WEIGHT: 313.2 LBS

## 2023-01-05 DIAGNOSIS — N18.31 STAGE 3A CHRONIC KIDNEY DISEASE (HCC): Primary | ICD-10-CM

## 2023-01-05 DIAGNOSIS — R73.01 ELEVATED FASTING BLOOD SUGAR: ICD-10-CM

## 2023-01-05 DIAGNOSIS — R03.0 BLOOD PRESSURE ELEVATED WITHOUT HISTORY OF HTN: ICD-10-CM

## 2023-01-05 DIAGNOSIS — E55.9 VITAMIN D DEFICIENCY: ICD-10-CM

## 2023-01-05 DIAGNOSIS — E66.01 MORBID OBESITY WITH BMI OF 40.0-44.9, ADULT (HCC): ICD-10-CM

## 2023-01-05 RX ORDER — ERGOCALCIFEROL 1.25 MG/1
50000 CAPSULE ORAL WEEKLY
Qty: 12 CAPSULE | Refills: 1 | Status: SHIPPED | OUTPATIENT
Start: 2023-01-05

## 2023-01-05 NOTE — PATIENT INSTRUCTIONS
Check your blood pressure readings and send them in via [employer]  Low sodium, high potassium diet   Goal sodium intake less than 2300 mg per day  Continue ergocalciferol (vitamin d supplement) weekly  Lab work in 6 months  Try Zevia or diet soda

## 2023-01-05 NOTE — PROGRESS NOTES
Nephrology   Office Follow-Up  Elvis Zamudio 45 y o  male MRN: [id number]    Encounter: [id number]        Assessment & Plan    Lisa Beltran was seen in the [place] office today  All diagnoses and orders for visit:     1  Stage 3a chronic kidney disease ([facility] [address] )  · Per [facility] record review, baseline creatinine around 1 4 mg/dL dating back to 2021, previously around 1 0 mg/dL  Suffered severe JENAE peak creatinine 5 2 mg/dL in April of 2022 associated with severe volume depletion from gastroenteritis  Risk factors for chronic disease include multiple previous volume insults in setting of UC, morbid obesity, prediabetes  Does not use NSAIDs  Does have elevated BP in office and I am concerned for hypertension  He will check BP at home and report readings  He endorses anxiety and stress     -     CBC and differential; Future; Expected date: 04/01/2023   -     Comprehensive metabolic panel; Future; Expected date: 04/01/2023   -     Magnesium; Future; Expected date: 04/01/2023   -     Microalbumin / creatinine urine ratio; Future; Expected date: 04/01/2023   -     Protein / creatinine ratio, urine; Future; Expected date: 04/01/2023   -     Urinalysis with microscopic; Future; Expected date: 04/01/2023  2  Vitamin D deficiency  · Continue vitamin D supplement   -     ergocalciferol (Drisdol) 1 25 MG (28023 UT) capsule; Take 1 capsule (50,000 Units total) by mouth once a week   -     Vitamin D 25 hydroxy; Future; Expected date: 04/01/2023  3  Elevated fasting blood sugar   -     Hemoglobin A1C; Future; Expected date: 04/01/2023  4  Blood pressure elevated without history of HTN  · Check home blood pressures and send in readings via mychart  He is agreeable  I suspect he will need antihypertensive therapy  5  Morbid obesity with BMI of 40 0-44 9, adult (HCC)  · Recommend reduction in caloric intake  He will stop drinking soda or switch to [facility] and once this is conquered he will cut out bread   This could reduce caloric intake by at least 500 calories per day which equates to 1 pound per week weight loss  We spent the majority of visit discussing weight loss strategies      HPI: Alem Bustillos is a 45 y o  male with history of JENAE and CKD 3a here for scheduled follow-up  Other pertinent problems include history of ulcerative colitis s/p total colectomy, parastomal hernia, obesity, prediabetes  Kidney function is stable and within typical baseline  Blood pressure is elevated last two checks and patient has gained weight  He will check blood pressure at home and send in readings  I recommend he reduce calories in diet, starting with soda and bread  Continue vitamin D supplement  Lab work to be repeated in about 4 months and then return to office with nephrologist        ROS:   Review of Systems   Constitutional: Positive for appetite change  Negative for chills and fever  HENT: Negative for ear pain and sore throat  Eyes: Negative for pain and visual disturbance  Respiratory: Negative for cough and shortness of breath  Cardiovascular: Negative for chest pain and palpitations  Gastrointestinal: Negative for abdominal pain and vomiting  Genitourinary: Negative for dysuria and hematuria  Musculoskeletal: Negative for arthralgias and back pain  Skin: Negative for color change and rash  Neurological: Negative for seizures and syncope  Psychiatric/Behavioral: The patient is nervous/anxious  All other systems reviewed and are negative  Allergies: Patient has no known allergies      Medications:   Current Outpatient Medications:   •  ergocalciferol (Drisdol) 1 25 MG (99456 UT) capsule, Take 1 capsule (50,000 Units total) by mouth once a week, Disp: 12 capsule, Rfl: 1    Past Medical History:   Diagnosis Date   • Ulcerative colitis ([facility] [address] )      Past Surgical History:   Procedure Laterality Date   • COLOSTOMY  2005   • ILEOSTOMY Right 2007     Family History   Problem Relation Age of Onset   • Alcohol abuse Mother    • Alzheimer's disease Father    • Cancer Sister       reports that he has never smoked  He has never used smokeless tobacco  He reports that he does not currently use alcohol  He reports that he does not use drugs  Physical Exam:   Vitals:    01/05/23 1543   BP: (!) 172/100   Pulse: 98   SpO2: 99%   Weight: (!) 142 kg (313 lb 3 2 oz)   Height: 5' 10" (1 778 m)     Body mass index is 44 94 kg/m²  General: conscious, cooperative, in no acute distress, appears stated age  Eyes: conjunctivae pale, anicteric sclerae  ENT: lips and mucous membranes moist  Neck: supple, no JVD, no masses  Chest:  essentially clear breath sounds bilaterally, no crackles, ronchus or wheezings  CVS: S1 & S2, normal rate, regular rhythm  Abdomen: soft, non-tender, non-distended, normoactive bowel sounds, rounded obese ostomy  Extremities: no edema of both legs  Skin: no rash   Neuro: awake, alert, oriented       Diagnostic Data:  Lab: I have personally reviewed pertinent lab results  ,   CBC:       CMP: No results found for: SODIUM, K, CL, CO2, ANIONGAP, BUN, CREATININE, GLUCOSE, CALCIUM, AST, ALT, ALKPHOS, PROT, BILITOT, EGFR,   PT/INR: No results found for: PT, INR,   Magnesium: No components found for: MAG,  Phosphorous: No results found for: PHOS    Patient Instructions   Check your blood pressure readings and send them in via [employer]junkyt  Low sodium, high potassium diet  Goal sodium intake less than 2300 mg per day  Continue ergocalciferol (vitamin d supplement) weekly  Lab work in 6 months  Try Zevia or diet soda      Portions of the record may have been created with voice recognition software  Occasional wrong word or "sound a like" substitutions may have occurred due to the inherent limitations of voice recognition software  Read the chart carefully and recognize, using context, where substitutions have occurred  If you have any questions, please contact the dictating provider

## 2023-05-05 LAB
CREAT ?TM UR-SCNC: 132.8 UMOL/L
EXT ALBUMIN URINE RANDOM: 1.7
HBA1C MFR BLD HPLC: 5.9 %
MICROALBUMIN/CREAT UR: 12.8 MG/G{CREAT}

## 2023-06-21 ENCOUNTER — TELEPHONE (OUTPATIENT)
Dept: NEPHROLOGY | Facility: CLINIC | Age: 53
End: 2023-06-21

## 2023-06-27 ENCOUNTER — APPOINTMENT (OUTPATIENT)
Dept: LAB | Facility: CLINIC | Age: 53
End: 2023-06-27
Payer: COMMERCIAL

## 2023-06-27 DIAGNOSIS — R73.01 ELEVATED FASTING BLOOD SUGAR: ICD-10-CM

## 2023-06-27 DIAGNOSIS — E78.2 MIXED HYPERLIPIDEMIA: ICD-10-CM

## 2023-06-27 DIAGNOSIS — E55.9 VITAMIN D DEFICIENCY: ICD-10-CM

## 2023-06-27 DIAGNOSIS — N18.31 STAGE 3A CHRONIC KIDNEY DISEASE (HCC): ICD-10-CM

## 2023-06-27 LAB
25(OH)D3 SERPL-MCNC: 21.8 NG/ML (ref 30–100)
ALBUMIN SERPL BCP-MCNC: 3.9 G/DL (ref 3.5–5)
ALP SERPL-CCNC: 76 U/L (ref 46–116)
ALT SERPL W P-5'-P-CCNC: 18 U/L (ref 12–78)
ANION GAP SERPL CALCULATED.3IONS-SCNC: 3 MMOL/L
AST SERPL W P-5'-P-CCNC: 16 U/L (ref 5–45)
BACTERIA UR QL AUTO: ABNORMAL /HPF
BASOPHILS # BLD AUTO: 0.09 THOUSANDS/ÂΜL (ref 0–0.1)
BASOPHILS NFR BLD AUTO: 1 % (ref 0–1)
BILIRUB SERPL-MCNC: 0.55 MG/DL (ref 0.2–1)
BILIRUB UR QL STRIP: NEGATIVE
BUN SERPL-MCNC: 19 MG/DL (ref 5–25)
CALCIUM SERPL-MCNC: 9.2 MG/DL (ref 8.3–10.1)
CHLORIDE SERPL-SCNC: 111 MMOL/L (ref 96–108)
CLARITY UR: CLEAR
CO2 SERPL-SCNC: 26 MMOL/L (ref 21–32)
COLOR UR: ABNORMAL
CREAT SERPL-MCNC: 1.39 MG/DL (ref 0.6–1.3)
CREAT UR-MCNC: 186 MG/DL
CREAT UR-MCNC: 186 MG/DL
EOSINOPHIL # BLD AUTO: 0.58 THOUSAND/ÂΜL (ref 0–0.61)
EOSINOPHIL NFR BLD AUTO: 7 % (ref 0–6)
ERYTHROCYTE [DISTWIDTH] IN BLOOD BY AUTOMATED COUNT: 13.1 % (ref 11.6–15.1)
EST. AVERAGE GLUCOSE BLD GHB EST-MCNC: 117 MG/DL
GFR SERPL CREATININE-BSD FRML MDRD: 56 ML/MIN/1.73SQ M
GLUCOSE P FAST SERPL-MCNC: 113 MG/DL (ref 65–99)
GLUCOSE UR STRIP-MCNC: NEGATIVE MG/DL
HBA1C MFR BLD: 5.7 %
HCT VFR BLD AUTO: 47.5 % (ref 36.5–49.3)
HGB BLD-MCNC: 15 G/DL (ref 12–17)
HGB UR QL STRIP.AUTO: ABNORMAL
IMM GRANULOCYTES # BLD AUTO: 0.03 THOUSAND/UL (ref 0–0.2)
IMM GRANULOCYTES NFR BLD AUTO: 0 % (ref 0–2)
KETONES UR STRIP-MCNC: NEGATIVE MG/DL
LDLC SERPL DIRECT ASSAY-MCNC: 97 MG/DL (ref 0–100)
LEUKOCYTE ESTERASE UR QL STRIP: NEGATIVE
LYMPHOCYTES # BLD AUTO: 2 THOUSANDS/ÂΜL (ref 0.6–4.47)
LYMPHOCYTES NFR BLD AUTO: 23 % (ref 14–44)
MAGNESIUM SERPL-MCNC: 2.4 MG/DL (ref 1.6–2.6)
MCH RBC QN AUTO: 28.2 PG (ref 26.8–34.3)
MCHC RBC AUTO-ENTMCNC: 31.6 G/DL (ref 31.4–37.4)
MCV RBC AUTO: 89 FL (ref 82–98)
MICROALBUMIN UR-MCNC: 25.8 MG/L (ref 0–20)
MICROALBUMIN/CREAT 24H UR: 14 MG/G CREATININE (ref 0–30)
MONOCYTES # BLD AUTO: 0.74 THOUSAND/ÂΜL (ref 0.17–1.22)
MONOCYTES NFR BLD AUTO: 8 % (ref 4–12)
NEUTROPHILS # BLD AUTO: 5.43 THOUSANDS/ÂΜL (ref 1.85–7.62)
NEUTS SEG NFR BLD AUTO: 61 % (ref 43–75)
NITRITE UR QL STRIP: NEGATIVE
NON-SQ EPI CELLS URNS QL MICRO: ABNORMAL /HPF
NRBC BLD AUTO-RTO: 0 /100 WBCS
PH UR STRIP.AUTO: 5.5 [PH]
PLATELET # BLD AUTO: 278 THOUSANDS/UL (ref 149–390)
PMV BLD AUTO: 10 FL (ref 8.9–12.7)
POTASSIUM SERPL-SCNC: 4.1 MMOL/L (ref 3.5–5.3)
PROT SERPL-MCNC: 7.7 G/DL (ref 6.4–8.4)
PROT UR STRIP-MCNC: ABNORMAL MG/DL
PROT UR-MCNC: 28 MG/DL
PROT/CREAT UR: 0.15 MG/G{CREAT} (ref 0–0.1)
RBC # BLD AUTO: 5.32 MILLION/UL (ref 3.88–5.62)
RBC #/AREA URNS AUTO: ABNORMAL /HPF
SODIUM SERPL-SCNC: 140 MMOL/L (ref 135–147)
SP GR UR STRIP.AUTO: 1.02 (ref 1–1.03)
UROBILINOGEN UR STRIP-ACNC: <2 MG/DL
WBC # BLD AUTO: 8.87 THOUSAND/UL (ref 4.31–10.16)
WBC #/AREA URNS AUTO: ABNORMAL /HPF

## 2023-06-27 PROCEDURE — 83735 ASSAY OF MAGNESIUM: CPT

## 2023-06-27 PROCEDURE — 83036 HEMOGLOBIN GLYCOSYLATED A1C: CPT

## 2023-06-27 PROCEDURE — 82306 VITAMIN D 25 HYDROXY: CPT

## 2023-06-27 PROCEDURE — 84156 ASSAY OF PROTEIN URINE: CPT

## 2023-06-27 PROCEDURE — 81001 URINALYSIS AUTO W/SCOPE: CPT

## 2023-06-27 PROCEDURE — 82570 ASSAY OF URINE CREATININE: CPT

## 2023-06-27 PROCEDURE — 80053 COMPREHEN METABOLIC PANEL: CPT

## 2023-06-27 PROCEDURE — 85025 COMPLETE CBC W/AUTO DIFF WBC: CPT

## 2023-06-27 PROCEDURE — 82043 UR ALBUMIN QUANTITATIVE: CPT

## 2023-06-27 PROCEDURE — 83721 ASSAY OF BLOOD LIPOPROTEIN: CPT

## 2023-06-27 PROCEDURE — 36415 COLL VENOUS BLD VENIPUNCTURE: CPT

## 2023-06-29 ENCOUNTER — OFFICE VISIT (OUTPATIENT)
Dept: NEPHROLOGY | Facility: CLINIC | Age: 53
End: 2023-06-29
Payer: COMMERCIAL

## 2023-06-29 VITALS
BODY MASS INDEX: 45.1 KG/M2 | HEART RATE: 91 BPM | SYSTOLIC BLOOD PRESSURE: 122 MMHG | OXYGEN SATURATION: 96 % | DIASTOLIC BLOOD PRESSURE: 86 MMHG | HEIGHT: 70 IN | WEIGHT: 315 LBS

## 2023-06-29 DIAGNOSIS — R03.0 BLOOD PRESSURE ELEVATED WITHOUT HISTORY OF HTN: ICD-10-CM

## 2023-06-29 DIAGNOSIS — R31.29 OTHER MICROSCOPIC HEMATURIA: ICD-10-CM

## 2023-06-29 DIAGNOSIS — N20.0 NEPHROLITHIASIS: ICD-10-CM

## 2023-06-29 DIAGNOSIS — E78.5 HYPERLIPIDEMIA, UNSPECIFIED HYPERLIPIDEMIA TYPE: ICD-10-CM

## 2023-06-29 DIAGNOSIS — E55.9 VITAMIN D INSUFFICIENCY: ICD-10-CM

## 2023-06-29 DIAGNOSIS — N18.31 STAGE 3A CHRONIC KIDNEY DISEASE (HCC): Primary | ICD-10-CM

## 2023-06-29 RX ORDER — PRAVASTATIN SODIUM 10 MG
10 TABLET ORAL DAILY
COMMUNITY
Start: 2023-06-19

## 2023-06-29 NOTE — PATIENT INSTRUCTIONS
Recommend to start taking vitamin D3 2000 unit once a day when you are finished with your current prescription of vitamin D  Hydrate to 2 quart per day ( you are already doing this)  Avoid motrin/advil/aleve/ibuprofen  You have stage 3 a kidney disease, you are functioning at 56%  Recommend checking kidney ultrasound for evaluation of kidney stones  Check 24 hr stone risk profile  Follow up in 6 months

## 2023-06-29 NOTE — PROGRESS NOTES
Assessment & Plan:    1  Stage 3a chronic kidney disease ([facility] [address] )  -     Stone risk profile; Future; Expected date: 09/29/2023  -     US kidney and bladder with pvr; Future; Expected date: 09/29/2023  -     Urinalysis with microscopic; Future; Expected date: 11/14/2023  -     Uric acid; Future; Expected date: 11/14/2023  -     PTH, intact; Future; Expected date: 11/14/2023  -     Albumin / creatinine urine ratio; Future; Expected date: 11/14/2023  -     Magnesium; Future; Expected date: 11/14/2023  -     Comprehensive metabolic panel; Future; Expected date: 11/14/2023  -     Phosphorus; Future; Expected date: 11/14/2023    2  Nephrolithiasis  -     Stone risk profile; Future; Expected date: 09/29/2023  -     US kidney and bladder with pvr; Future; Expected date: 09/29/2023  -     Urinalysis with microscopic; Future; Expected date: 11/14/2023  -     Uric acid; Future; Expected date: 11/14/2023  -     PTH, intact; Future; Expected date: 11/14/2023  -     Albumin / creatinine urine ratio; Future; Expected date: 11/14/2023  -     Magnesium; Future; Expected date: 11/14/2023  -     Comprehensive metabolic panel; Future; Expected date: 11/14/2023  -     Phosphorus; Future; Expected date: 11/14/2023    3  Hyperlipidemia, unspecified hyperlipidemia type    4  Blood pressure elevated without history of HTN    5  Vitamin D insufficiency    6  Other microscopic hematuria       1  CKD3a A1  Cr baseline 1 3-1 4 range  Etiologies: severe ATN event, undiagnosed HTN, obesity related hyperfiltration  Microabuminuria cr ratio 14 mg/g cr  Metabolic parameters stable  Bone mineral stable  Albumin normal  Microabuminuria/cr ratio<30 mg/g cr  UA trace protein, small occult blood  AVOID NSAID  Hydrate to 2 quart per day  If gi losses > 24 hr and unable to keep up with PO intake, should go to ER  Reviewed CKD stages, Cr and eGFR trends  Check renal US to establish stone burden  Follow up in 6 months    If eGFR <45 ml/min, would consider SGLT-2i use  Follow up cmp,pth,uric acid,ua, alb/cr ratio,mag,phos  2  Nephrolithiasis, unclear which type  Target urine volume 2 5 L per day if we can  Hydrates to at least 2 quart per day  Check 24 hr stone risk profile x1  Check renal US for existing stone burden  3  HLD   Continue statin per primary  4  Blood pressure elevation without hx of HTN  Suspect white coat HTN with anxiety  BP under ideal control at present 122/86  No need for antihypertensive at present  If BP>140/90, would consider ACE/ARB use  5  Vitamin D insufficiency  Finish out 50,000 unit D2 and then start taking 2000 unit D3 per day  6  Microscopic hematuria, seen by urology 2 years ago  Denies cystoscopy in past  Has he already was evaluated by urology for this condition, will hold off on repeat referral    The benefits, risks and alternatives to the treatment plan were discussed at this visit  Patient was advised of common adverse effects of any medical therapies prescribed  All questions were answered and discussed with the patient and any accompanying family members or caretakers  Subjective:      Patient ID: Yasmin Perry is a 52 y o  male presents for follow up in the [place] office  Last seen 1/5/23 on JENAE/CKD  Cr 5 2 with severe JENAE in April 2022  Baseline Cr 1 4 2021  HPI    In interim since last visit, denies any new medical conditions, surgeries, allergies or medications  He was on amoxicillin for infected wisdom teeth  He will need to have surgery for his cavity in August and will require amoxicillin again  Takes BP at home intermittently and getting similar /80s  Reports significant anxiety last visit which was driving bp  Drinks fluid throughout the day, at least 64 ounces of water per day  Denies NSAID use  Still taking once a week vitamin D, but has been missing doses  Previously had LE cellulitis, right and using moisturizer and spray to prevent  "    He has microscopic hematuria and seeing a urologist 2 years ago  The following portions of the patient's history were reviewed and updated as appropriate: allergies, current medications, past family history, past medical history, past social history, past surgical history, and problem list     Review of Systems   Constitutional: Negative  Respiratory: Negative  Cardiovascular: Negative  Gastrointestinal: Positive for diarrhea  Genitourinary: Negative  Musculoskeletal: Negative  Neurological: Negative  All other systems reviewed and are negative  Objective:      /86 (BP Location: Left arm, Patient Position: Sitting, Cuff Size: Large)   Pulse 91   Ht 5' 10\" (1 778 m)   Wt (!) 145 kg (318 lb 12 8 oz)   SpO2 96%   BMI 45 74 kg/m²          Physical Exam  Vitals reviewed  Constitutional:       General: He is not in acute distress  Appearance: He is obese  HENT:      Head: Atraumatic  Nose: Nose normal       Mouth/Throat:      Mouth: Mucous membranes are moist       Pharynx: Oropharynx is clear  Eyes:      Extraocular Movements: Extraocular movements intact  Conjunctiva/sclera: Conjunctivae normal    Cardiovascular:      Rate and Rhythm: Normal rate and regular rhythm  Heart sounds: No friction rub  Pulmonary:      Breath sounds: No wheezing, rhonchi or rales  Abdominal:      General: There is no distension  Palpations: Abdomen is soft  Tenderness: There is no abdominal tenderness  There is no guarding  Comments: + colostomy   Musculoskeletal:      Right lower leg: No edema  Left lower leg: No edema  Skin:     General: Skin is warm  Coloration: Skin is not jaundiced  Neurological:      General: No focal deficit present  Mental Status: He is alert and oriented to person, place, and time  Cranial Nerves: No cranial nerve deficit     Psychiatric:         Mood and Affect: Mood normal          Behavior: Behavior " "normal              Lab Results   Component Value Date    SODIUM 140 06/27/2023    K 4 1 06/27/2023     (H) 06/27/2023    CO2 26 06/27/2023    AGAP 3 06/27/2023    BUN 19 06/27/2023    CREATININE 1 39 (H) 06/27/2023    GLUC 94 04/04/2022    GLUF 113 (H) 06/27/2023    CALCIUM 9 2 06/27/2023    AST 16 06/27/2023    ALT 18 06/27/2023    ALKPHOS 76 06/27/2023    TP 7 7 06/27/2023    TBILI 0 55 06/27/2023    EGFR 56 06/27/2023      Lab Results   Component Value Date    CREATININE 1 39 (H) 06/27/2023    CREATININE 1 45 (H) 11/23/2022    CREATININE 1 77 (H) 04/04/2022    CREATININE 2 38 (H) 04/03/2022    CREATININE 3 51 (H) 04/02/2022    CREATININE 4 35 (H) 04/01/2022    CREATININE 4 34 (H) 04/01/2022    CREATININE 5 20 (H) 04/01/2022    CREATININE 1 68 (H) 03/29/2022    CREATININE 1 89 (H) 03/28/2022    CREATININE 2 14 (H) 03/27/2022      Lab Results   Component Value Date    COLORU Light Yellow 06/27/2023    CLARITYU Clear 06/27/2023    SPECGRAV 1 019 06/27/2023    PHUR 5 5 06/27/2023    LEUKOCYTESUR Negative 06/27/2023    NITRITE Negative 06/27/2023    PROTEIN UA Trace (A) 06/27/2023    GLUCOSEU Negative 06/27/2023    KETONESU Negative 06/27/2023    UROBILINOGEN <2 0 06/27/2023    BILIRUBINUR Negative 06/27/2023    BLOODU Small (A) 06/27/2023    RBCUA 1-2 06/27/2023    WBCUA 1-2 06/27/2023    EPIS Occasional 06/27/2023    BACTERIA None Seen 06/27/2023      No results found for: \"LABPROT\"  No results found for: \"MICROALBUR\", \"UGAT94IPT\"  Lab Results   Component Value Date    WBC 8 87 06/27/2023    HGB 15 0 06/27/2023    HCT 47 5 06/27/2023    MCV 89 06/27/2023     06/27/2023      Lab Results   Component Value Date    HGB 15 0 06/27/2023    HGB 14 5 04/04/2022    HGB 16 0 04/03/2022    HGB 17 2 (H) 04/02/2022    HGB 19 4 (H) 04/01/2022      No results found for: \"IRON\", \"TIBC\", \"FERRITIN\"   No results found for: \"PTHCALCIUM\", \"NENK14ZVFVYB\", \"PHOSPHORUS\"   No results found for: \"CHOLESTEROL\", \"HDL\", " "\"LDLCALC\", \"TRIG\"   Lab Results   Component Value Date    URICACID 9 1 (H) 11/23/2022      Lab Results   Component Value Date    HGBA1C 5 7 (H) 06/27/2023      No results found for: \"TSHANTIBODY\", \"H5AZDJB\", \"FREET4\"   No results found for: \"GARRETT\", \"DSDNAAB\", \"RFIGM\"   No results found for: \"PROT\", \"UPEP\", \"IMMUNOFIX\", \"KAPPALAMBDA\", \"KAPPALIGHT\"     Portions of the record may have been created with voice recognition software  Occasional wrong word or \"sound a like\" substitutions may have occurred due to the inherent limitations of voice recognition software  Read the chart carefully and recognize, using context, where substitutions have occurred  If you have any questions, please contact the dictating provider        "

## 2023-07-05 ENCOUNTER — HOSPITAL ENCOUNTER (OUTPATIENT)
Dept: NON INVASIVE DIAGNOSTICS | Facility: HOSPITAL | Age: 53
Discharge: HOME | End: 2023-07-05
Payer: COMMERCIAL

## 2023-07-05 DIAGNOSIS — I49.3 VENTRICULAR PREMATURE DEPOLARIZATION: ICD-10-CM

## 2023-07-05 PROCEDURE — 93225 XTRNL ECG REC<48 HRS REC: CPT

## 2023-07-05 PROCEDURE — 93226 XTRNL ECG REC<48 HR SCAN A/R: CPT

## 2023-07-11 PROCEDURE — 93227 XTRNL ECG REC<48 HR R&I: CPT | Performed by: INTERNAL MEDICINE

## 2023-12-19 ENCOUNTER — TELEPHONE (OUTPATIENT)
Dept: NEPHROLOGY | Facility: CLINIC | Age: 53
End: 2023-12-19

## 2023-12-26 ENCOUNTER — APPOINTMENT (OUTPATIENT)
Dept: LAB | Facility: CLINIC | Age: 53
End: 2023-12-26
Payer: COMMERCIAL

## 2023-12-26 DIAGNOSIS — N18.31 STAGE 3A CHRONIC KIDNEY DISEASE (HCC): ICD-10-CM

## 2023-12-26 DIAGNOSIS — N20.0 NEPHROLITHIASIS: ICD-10-CM

## 2023-12-26 LAB
ALBUMIN SERPL BCP-MCNC: 4.4 G/DL (ref 3.5–5)
ALP SERPL-CCNC: 65 U/L (ref 34–104)
ALT SERPL W P-5'-P-CCNC: 16 U/L (ref 7–52)
AMORPH URATE CRY URNS QL MICRO: ABNORMAL
ANION GAP SERPL CALCULATED.3IONS-SCNC: 10 MMOL/L
AST SERPL W P-5'-P-CCNC: 19 U/L (ref 13–39)
BACTERIA UR QL AUTO: ABNORMAL /HPF
BILIRUB SERPL-MCNC: 0.56 MG/DL (ref 0.2–1)
BILIRUB UR QL STRIP: NEGATIVE
BUN SERPL-MCNC: 19 MG/DL (ref 5–25)
CALCIUM SERPL-MCNC: 9.6 MG/DL (ref 8.4–10.2)
CHLORIDE SERPL-SCNC: 106 MMOL/L (ref 96–108)
CLARITY UR: CLEAR
CO2 SERPL-SCNC: 23 MMOL/L (ref 21–32)
COLOR UR: ABNORMAL
CREAT SERPL-MCNC: 1.33 MG/DL (ref 0.6–1.3)
CREAT UR-MCNC: 143.8 MG/DL
GFR SERPL CREATININE-BSD FRML MDRD: 59 ML/MIN/1.73SQ M
GLUCOSE SERPL-MCNC: 103 MG/DL (ref 65–140)
GLUCOSE UR STRIP-MCNC: NEGATIVE MG/DL
HGB UR QL STRIP.AUTO: ABNORMAL
KETONES UR STRIP-MCNC: NEGATIVE MG/DL
LEUKOCYTE ESTERASE UR QL STRIP: NEGATIVE
MAGNESIUM SERPL-MCNC: 2 MG/DL (ref 1.9–2.7)
MICROALBUMIN UR-MCNC: 19.6 MG/L
MICROALBUMIN/CREAT 24H UR: 14 MG/G CREATININE (ref 0–30)
MUCOUS THREADS UR QL AUTO: ABNORMAL
NITRITE UR QL STRIP: NEGATIVE
NON-SQ EPI CELLS URNS QL MICRO: ABNORMAL /HPF
PH UR STRIP.AUTO: 5.5 [PH]
PHOSPHATE SERPL-MCNC: 3.6 MG/DL (ref 2.7–4.5)
POTASSIUM SERPL-SCNC: 4.1 MMOL/L (ref 3.5–5.3)
PROT SERPL-MCNC: 7.7 G/DL (ref 6.4–8.4)
PROT UR STRIP-MCNC: ABNORMAL MG/DL
PTH-INTACT SERPL-MCNC: 99.5 PG/ML (ref 12–88)
RBC #/AREA URNS AUTO: ABNORMAL /HPF
SODIUM SERPL-SCNC: 139 MMOL/L (ref 135–147)
SP GR UR STRIP.AUTO: 1.02 (ref 1–1.03)
URATE SERPL-MCNC: 8.8 MG/DL (ref 3.5–8.5)
UROBILINOGEN UR STRIP-ACNC: <2 MG/DL
WBC #/AREA URNS AUTO: ABNORMAL /HPF

## 2023-12-26 PROCEDURE — 84550 ASSAY OF BLOOD/URIC ACID: CPT

## 2023-12-26 PROCEDURE — 83735 ASSAY OF MAGNESIUM: CPT

## 2023-12-26 PROCEDURE — 82570 ASSAY OF URINE CREATININE: CPT

## 2023-12-26 PROCEDURE — 36415 COLL VENOUS BLD VENIPUNCTURE: CPT

## 2023-12-26 PROCEDURE — 80053 COMPREHEN METABOLIC PANEL: CPT

## 2023-12-26 PROCEDURE — 83970 ASSAY OF PARATHORMONE: CPT

## 2023-12-26 PROCEDURE — 84100 ASSAY OF PHOSPHORUS: CPT

## 2023-12-26 PROCEDURE — 82043 UR ALBUMIN QUANTITATIVE: CPT

## 2023-12-26 PROCEDURE — 81001 URINALYSIS AUTO W/SCOPE: CPT

## 2023-12-29 ENCOUNTER — OFFICE VISIT (OUTPATIENT)
Dept: NEPHROLOGY | Facility: CLINIC | Age: 53
End: 2023-12-29
Payer: COMMERCIAL

## 2023-12-29 VITALS
OXYGEN SATURATION: 97 % | DIASTOLIC BLOOD PRESSURE: 78 MMHG | HEART RATE: 52 BPM | WEIGHT: 315 LBS | HEIGHT: 70 IN | SYSTOLIC BLOOD PRESSURE: 120 MMHG | BODY MASS INDEX: 45.1 KG/M2

## 2023-12-29 DIAGNOSIS — E55.9 VITAMIN D DEFICIENCY: ICD-10-CM

## 2023-12-29 DIAGNOSIS — E79.0 HYPERURICEMIA: ICD-10-CM

## 2023-12-29 DIAGNOSIS — N18.31 STAGE 3A CHRONIC KIDNEY DISEASE (HCC): Primary | ICD-10-CM

## 2023-12-29 DIAGNOSIS — E78.5 HYPERLIPIDEMIA, UNSPECIFIED HYPERLIPIDEMIA TYPE: ICD-10-CM

## 2023-12-29 DIAGNOSIS — E21.3 HYPERPARATHYROIDISM (HCC): ICD-10-CM

## 2023-12-29 PROCEDURE — 99214 OFFICE O/P EST MOD 30 MIN: CPT | Performed by: INTERNAL MEDICINE

## 2023-12-29 RX ORDER — ERGOCALCIFEROL 1.25 MG/1
50000 CAPSULE ORAL WEEKLY
Qty: 12 CAPSULE | Refills: 1 | Status: SHIPPED | OUTPATIENT
Start: 2023-12-29

## 2023-12-29 NOTE — PATIENT INSTRUCTIONS
Your most recent kidney function is stable at 59%.  Your parathyroid and uric acid are slightly elevated and we will continue to trend these parameters.  Avoid motrin, advil,ibuprofen, aleve on a routine basis.  Hydrate to 2 quart per day.   Advised low purine diet.  Complete kidney ultrasound and 24 hour urine prior to next visit.

## 2023-12-29 NOTE — PROGRESS NOTES
Assessment & Plan:    1. Stage 3a chronic kidney disease (HCC)  -     Urinalysis with microscopic; Future; Expected date: 06/05/2024  -     Uric acid; Future; Expected date: 06/05/2024  -     PTH, intact; Future; Expected date: 06/05/2024  -     Albumin / creatinine urine ratio; Future; Expected date: 06/05/2024  -     Magnesium; Future; Expected date: 06/05/2024  -     Comprehensive metabolic panel; Future; Expected date: 06/05/2024  -     Phosphorus; Future; Expected date: 06/05/2024  -     CBC and differential; Future; Expected date: 06/05/2024    2. Hyperparathyroidism (HCC)    3. Hyperuricemia    4. Hyperlipidemia, unspecified hyperlipidemia type         1.  CKD 3a, baseline creatinine 1.3-1.4 Mg per deciliter  12/26 creatinine 1.3 with EGFR 59 mL/min.  Volume status appears compensated.  Metabolic parameters stable.  Urine albumin/creatinine ratio normal.  PTH trends mildly elevated, will attempt to trend.  No need for calcitriol.    Reviewed CKD stages and EGFR meaning in simple terms.    Recommend to check renal ultrasound and stone risk profile prior to next visit to evaluate nephrolithiasis metabolic risk.  He does have a history of kidney stones.    Follow-up in 6 months with labs prior.      2.  Hyperparathyroidism  Goal PTH in stage III is 35-70.  Will continue to trend PTH and if increases to greater than 150 would consider to treat.    3.  Asymptomatic hyperuricemia, uric acid 8.8.  Denies convincing history of gout.  He is not maintained on allopurinol.  Advise low purine diet.    4.  Hyperlipidemia  Encourage dietary modifications.  Maintained on pravastatin.  Continue lipid monitoring per primary team.    5.  Health maintenance  Continue age-appropriate vaccines and cancer screening per primary.    6.  Bradycardia  Asymptomatic, will continue to monitor he is not on any AV yenni blockade.  He is asymptomatic.  May consider cardiology evaluation in the discretion of primary.    The benefits, risks  "and alternatives to the treatment plan were discussed at this visit. Patient was advised of common adverse effects of any medical therapies prescribed. All questions were answered and discussed with the patient and any accompanying family members or caretakers.      Subjective:      Patient ID: Fredrick Zamudio is a 53 y.o. male presents for follow-up in the [place] office.  Patient last seen on 6/29/2023 for stage III kidney disease.  He has baseline 1.3-1.4.    HPI    In the interim since his last visit, he reports having several teeth pulled.  Denies any new medical conditions or hospitalizations.    Patient reported questionable history of gout he had injured his foot previously and he had presented to the ER and he was told he had gout at that time.  No other gout episodes and he has not been treated for such.  Denies any history of nephrolithiasis.    Most recent labs on 12/26 showed creatinine 1.3 with EGFR 59 mL/min.  PTH 99.5.  Uric acid 8.8.    Denies chest pain, shortness of breath, nausea, vomiting, diarrhea.  Denies LUTS.  3 of systems negative.    The following portions of the patient's history were reviewed and updated as appropriate: allergies, current medications, past family history, past medical history, past social history, past surgical history, and problem list.    Review of Systems   Respiratory: Negative.     Cardiovascular: Negative.    Gastrointestinal: Negative.    Genitourinary: Negative.    Neurological: Negative.    All other systems reviewed and are negative.        Objective:      /78 (BP Location: Left arm, Patient Position: Sitting, Cuff Size: Large)   Pulse (!) 52   Ht 5' 10\" (1.778 m)   Wt (!) 145 kg (320 lb)   SpO2 97%   BMI 45.92 kg/m²          Physical Exam  Vitals reviewed.   Constitutional:       General: He is not in acute distress.     Appearance: He is obese.   HENT:      Head: Atraumatic.      Nose: Nose normal.      Mouth/Throat:      Mouth: Mucous membranes " are moist.      Pharynx: Oropharynx is clear.   Eyes:      Extraocular Movements: Extraocular movements intact.      Conjunctiva/sclera: Conjunctivae normal.   Cardiovascular:      Rate and Rhythm: Regular rhythm. Bradycardia present.      Heart sounds:      No friction rub.   Pulmonary:      Breath sounds: No wheezing, rhonchi or rales.   Abdominal:      General: There is no distension.      Tenderness: There is no abdominal tenderness. There is no guarding.   Musculoskeletal:      Right lower leg: No edema.      Left lower leg: No edema.   Skin:     General: Skin is warm.      Coloration: Skin is not jaundiced.      Findings: No bruising or rash.   Neurological:      General: No focal deficit present.      Mental Status: He is alert and oriented to person, place, and time. Mental status is at baseline.      Cranial Nerves: No cranial nerve deficit.   Psychiatric:         Mood and Affect: Mood normal.         Behavior: Behavior normal.             Lab Results   Component Value Date    SODIUM 139 12/26/2023    K 4.1 12/26/2023     12/26/2023    CO2 23 12/26/2023    AGAP 10 12/26/2023    BUN 19 12/26/2023    CREATININE 1.33 (H) 12/26/2023    GLUC 103 12/26/2023    GLUF 113 (H) 06/27/2023    CALCIUM 9.6 12/26/2023    AST 19 12/26/2023    ALT 16 12/26/2023    ALKPHOS 65 12/26/2023    TP 7.7 12/26/2023    TBILI 0.56 12/26/2023    EGFR 59 12/26/2023      Lab Results   Component Value Date    CREATININE 1.33 (H) 12/26/2023    CREATININE 1.39 (H) 06/27/2023    CREATININE 1.45 (H) 11/23/2022    CREATININE 1.77 (H) 04/04/2022    CREATININE 2.38 (H) 04/03/2022    CREATININE 3.51 (H) 04/02/2022    CREATININE 4.35 (H) 04/01/2022    CREATININE 4.34 (H) 04/01/2022    CREATININE 5.20 (H) 04/01/2022    CREATININE 1.68 (H) 03/29/2022    CREATININE 1.89 (H) 03/28/2022    CREATININE 2.14 (H) 03/27/2022      Lab Results   Component Value Date    COLORU Light Yellow 12/26/2023    CLARITYU Clear 12/26/2023    SPECGRAV 1.017  "12/26/2023    PHUR 5.5 12/26/2023    LEUKOCYTESUR Negative 12/26/2023    NITRITE Negative 12/26/2023    PROTEIN UA Trace (A) 12/26/2023    GLUCOSEU Negative 12/26/2023    KETONESU Negative 12/26/2023    UROBILINOGEN <2.0 12/26/2023    BILIRUBINUR Negative 12/26/2023    BLOODU Trace (A) 12/26/2023    RBCUA None Seen 12/26/2023    WBCUA None Seen 12/26/2023    EPIS None Seen 12/26/2023    BACTERIA None Seen 12/26/2023      No results found for: \"LABPROT\"  No results found for: \"MICROALBUR\", \"[id number]\"  Lab Results   Component Value Date    WBC 8.87 06/27/2023    HGB 15.0 06/27/2023    HCT 47.5 06/27/2023    MCV 89 06/27/2023     06/27/2023      Lab Results   Component Value Date    HGB 15.0 06/27/2023    HGB 14.5 04/04/2022    HGB 16.0 04/03/2022    HGB 17.2 (H) 04/02/2022    HGB 19.4 (H) 04/01/2022      No results found for: \"IRON\", \"TIBC\", \"FERRITIN\"   No results found for: \"PTHCALCIUM\", \"[id number]\", \"PHOSPHORUS\"   No results found for: \"CHOLESTEROL\", \"HDL\", \"LDLCALC\", \"TRIG\"   Lab Results   Component Value Date    URICACID 8.8 (H) 12/26/2023      Lab Results   Component Value Date    HGBA1C 5.7 (H) 06/27/2023      No results found for: \"TSHANTIBODY\", \"M1FTHXR\", \"FREET4\"   No results found for: \"GARRETT\", \"DSDNAAB\", \"RFIGM\"   No results found for: \"PROT\", \"UPEP\", \"IMMUNOFIX\", \"KAPPALAMBDA\", \"KAPPALIGHT\"     Portions of the record may have been created with voice recognition software. Occasional wrong word or \"sound a like\" substitutions may have occurred due to the inherent limitations of voice recognition software. Read the chart carefully and recognize, using context, where substitutions have occurred. If you have any questions, please contact the dictating provider.          "

## 2024-04-08 ENCOUNTER — TELEPHONE (OUTPATIENT)
Dept: NEPHROLOGY | Facility: CLINIC | Age: 54
End: 2024-04-08

## 2024-04-21 ENCOUNTER — APPOINTMENT (EMERGENCY)
Dept: CT IMAGING | Facility: HOSPITAL | Age: 54
DRG: 660 | End: 2024-04-21
Payer: COMMERCIAL

## 2024-04-21 ENCOUNTER — HOSPITAL ENCOUNTER (INPATIENT)
Facility: HOSPITAL | Age: 54
LOS: 3 days | Discharge: HOME | DRG: 660 | End: 2024-04-24
Attending: EMERGENCY MEDICINE | Admitting: STUDENT IN AN ORGANIZED HEALTH CARE EDUCATION/TRAINING PROGRAM
Payer: COMMERCIAL

## 2024-04-21 DIAGNOSIS — N17.9 AKI (ACUTE KIDNEY INJURY) (HCC): ICD-10-CM

## 2024-04-21 DIAGNOSIS — N13.2 HYDRONEPHROSIS WITH URINARY OBSTRUCTION DUE TO URETERAL CALCULUS: ICD-10-CM

## 2024-04-21 DIAGNOSIS — N20.1 URETEROLITHIASIS: ICD-10-CM

## 2024-04-21 DIAGNOSIS — R03.0 BLOOD PRESSURE ELEVATED WITHOUT HISTORY OF HTN: ICD-10-CM

## 2024-04-21 DIAGNOSIS — N12 PYELONEPHRITIS: Primary | ICD-10-CM

## 2024-04-21 DIAGNOSIS — N13.30 HYDRONEPHROSIS, UNSPECIFIED HYDRONEPHROSIS TYPE: ICD-10-CM

## 2024-04-21 PROBLEM — N30.01 ACUTE CYSTITIS WITH HEMATURIA: Status: ACTIVE | Noted: 2024-04-21

## 2024-04-21 PROBLEM — D72.829 LEUKOCYTOSIS: Status: RESOLVED | Noted: 2022-04-01 | Resolved: 2024-04-21

## 2024-04-21 PROBLEM — N10 ACUTE PYELONEPHRITIS: Status: ACTIVE | Noted: 2024-04-21

## 2024-04-21 PROBLEM — K56.7 ILEUS (HCC): Status: RESOLVED | Noted: 2022-04-01 | Resolved: 2024-04-21

## 2024-04-21 PROBLEM — E87.6 HYPOKALEMIA: Status: RESOLVED | Noted: 2022-04-02 | Resolved: 2024-04-21

## 2024-04-21 PROBLEM — E87.1 HYPONATREMIA: Status: RESOLVED | Noted: 2022-03-28 | Resolved: 2024-04-21

## 2024-04-21 LAB
ALBUMIN SERPL BCP-MCNC: 4.3 G/DL (ref 3.5–5)
ALP SERPL-CCNC: 63 U/L (ref 34–104)
ALT SERPL W P-5'-P-CCNC: 12 U/L (ref 7–52)
ANION GAP SERPL CALCULATED.3IONS-SCNC: 8 MMOL/L (ref 4–13)
ANION GAP SERPL CALCULATED.3IONS-SCNC: 8 MMOL/L (ref 4–13)
APTT PPP: 31 SECONDS (ref 23–37)
AST SERPL W P-5'-P-CCNC: 17 U/L (ref 13–39)
BACTERIA UR QL AUTO: ABNORMAL /HPF
BASOPHILS # BLD MANUAL: 0 THOUSAND/UL (ref 0–0.1)
BASOPHILS NFR MAR MANUAL: 0 % (ref 0–1)
BILIRUB SERPL-MCNC: 0.49 MG/DL (ref 0.2–1)
BILIRUB UR QL STRIP: ABNORMAL
BUN SERPL-MCNC: 23 MG/DL (ref 5–25)
BUN SERPL-MCNC: 24 MG/DL (ref 5–25)
CALCIUM SERPL-MCNC: 9.1 MG/DL (ref 8.4–10.2)
CALCIUM SERPL-MCNC: 9.3 MG/DL (ref 8.4–10.2)
CHLORIDE SERPL-SCNC: 106 MMOL/L (ref 96–108)
CHLORIDE SERPL-SCNC: 106 MMOL/L (ref 96–108)
CLARITY UR: ABNORMAL
CO2 SERPL-SCNC: 23 MMOL/L (ref 21–32)
CO2 SERPL-SCNC: 23 MMOL/L (ref 21–32)
COLOR UR: ABNORMAL
CREAT SERPL-MCNC: 1.91 MG/DL (ref 0.6–1.3)
CREAT SERPL-MCNC: 2.1 MG/DL (ref 0.6–1.3)
EOSINOPHIL # BLD MANUAL: 0 THOUSAND/UL (ref 0–0.4)
EOSINOPHIL NFR BLD MANUAL: 0 % (ref 0–6)
ERYTHROCYTE [DISTWIDTH] IN BLOOD BY AUTOMATED COUNT: 13.2 % (ref 11.6–15.1)
GFR SERPL CREATININE-BSD FRML MDRD: 34 ML/MIN/1.73SQ M
GFR SERPL CREATININE-BSD FRML MDRD: 38 ML/MIN/1.73SQ M
GLUCOSE SERPL-MCNC: 109 MG/DL (ref 65–140)
GLUCOSE SERPL-MCNC: 113 MG/DL (ref 65–140)
GLUCOSE UR STRIP-MCNC: NEGATIVE MG/DL
HCT VFR BLD AUTO: 47.2 % (ref 36.5–49.3)
HGB BLD-MCNC: 15.7 G/DL (ref 12–17)
HGB UR QL STRIP.AUTO: ABNORMAL
INR PPP: 0.98 (ref 0.84–1.19)
KETONES UR STRIP-MCNC: ABNORMAL MG/DL
LEUKOCYTE ESTERASE UR QL STRIP: ABNORMAL
LYMPHOCYTES # BLD AUTO: 1.03 THOUSAND/UL (ref 0.6–4.47)
LYMPHOCYTES # BLD AUTO: 7 % (ref 14–44)
MCH RBC QN AUTO: 29.4 PG (ref 26.8–34.3)
MCHC RBC AUTO-ENTMCNC: 33.3 G/DL (ref 31.4–37.4)
MCV RBC AUTO: 88 FL (ref 82–98)
MONOCYTES # BLD AUTO: 0.89 THOUSAND/UL (ref 0–1.22)
MONOCYTES NFR BLD: 6 % (ref 4–12)
NEUTROPHILS # BLD MANUAL: 12.85 THOUSAND/UL (ref 1.85–7.62)
NEUTS SEG NFR BLD AUTO: 87 % (ref 43–75)
NITRITE UR QL STRIP: POSITIVE
NON-SQ EPI CELLS URNS QL MICRO: ABNORMAL /HPF
PH UR STRIP.AUTO: 6.5 [PH]
PLATELET # BLD AUTO: 250 THOUSANDS/UL (ref 149–390)
PLATELET BLD QL SMEAR: ADEQUATE
PMV BLD AUTO: 9.7 FL (ref 8.9–12.7)
POTASSIUM SERPL-SCNC: 4.4 MMOL/L (ref 3.5–5.3)
POTASSIUM SERPL-SCNC: 4.8 MMOL/L (ref 3.5–5.3)
PROCALCITONIN SERPL-MCNC: 0.1 NG/ML
PROT SERPL-MCNC: 7.3 G/DL (ref 6.4–8.4)
PROT UR STRIP-MCNC: ABNORMAL MG/DL
PROTHROMBIN TIME: 13.2 SECONDS (ref 11.6–14.5)
RBC # BLD AUTO: 5.34 MILLION/UL (ref 3.88–5.62)
RBC #/AREA URNS AUTO: ABNORMAL /HPF
RBC MORPH BLD: NORMAL
SODIUM SERPL-SCNC: 137 MMOL/L (ref 135–147)
SODIUM SERPL-SCNC: 137 MMOL/L (ref 135–147)
SP GR UR STRIP.AUTO: 1.02
UROBILINOGEN UR QL STRIP.AUTO: 1 E.U./DL
WBC # BLD AUTO: 14.77 THOUSAND/UL (ref 4.31–10.16)
WBC #/AREA URNS AUTO: ABNORMAL /HPF

## 2024-04-21 PROCEDURE — 99285 EMERGENCY DEPT VISIT HI MDM: CPT | Performed by: EMERGENCY MEDICINE

## 2024-04-21 PROCEDURE — 74176 CT ABD & PELVIS W/O CONTRAST: CPT

## 2024-04-21 PROCEDURE — 96361 HYDRATE IV INFUSION ADD-ON: CPT

## 2024-04-21 PROCEDURE — 85730 THROMBOPLASTIN TIME PARTIAL: CPT | Performed by: EMERGENCY MEDICINE

## 2024-04-21 PROCEDURE — 85610 PROTHROMBIN TIME: CPT | Performed by: EMERGENCY MEDICINE

## 2024-04-21 PROCEDURE — 36415 COLL VENOUS BLD VENIPUNCTURE: CPT | Performed by: EMERGENCY MEDICINE

## 2024-04-21 PROCEDURE — 85027 COMPLETE CBC AUTOMATED: CPT | Performed by: EMERGENCY MEDICINE

## 2024-04-21 PROCEDURE — 96376 TX/PRO/DX INJ SAME DRUG ADON: CPT

## 2024-04-21 PROCEDURE — 84145 PROCALCITONIN (PCT): CPT | Performed by: PHYSICIAN ASSISTANT

## 2024-04-21 PROCEDURE — 80048 BASIC METABOLIC PNL TOTAL CA: CPT | Performed by: EMERGENCY MEDICINE

## 2024-04-21 PROCEDURE — 85007 BL SMEAR W/DIFF WBC COUNT: CPT | Performed by: EMERGENCY MEDICINE

## 2024-04-21 PROCEDURE — 81001 URINALYSIS AUTO W/SCOPE: CPT | Performed by: EMERGENCY MEDICINE

## 2024-04-21 PROCEDURE — 99223 1ST HOSP IP/OBS HIGH 75: CPT | Performed by: PHYSICIAN ASSISTANT

## 2024-04-21 PROCEDURE — 99285 EMERGENCY DEPT VISIT HI MDM: CPT

## 2024-04-21 PROCEDURE — 96365 THER/PROPH/DIAG IV INF INIT: CPT

## 2024-04-21 PROCEDURE — 80053 COMPREHEN METABOLIC PANEL: CPT | Performed by: EMERGENCY MEDICINE

## 2024-04-21 PROCEDURE — 96375 TX/PRO/DX INJ NEW DRUG ADDON: CPT

## 2024-04-21 RX ORDER — HYDROMORPHONE HCL/PF 1 MG/ML
0.5 SYRINGE (ML) INJECTION EVERY 4 HOURS PRN
Status: DISCONTINUED | OUTPATIENT
Start: 2024-04-21 | End: 2024-04-24 | Stop reason: HOSPADM

## 2024-04-21 RX ORDER — ACETAMINOPHEN 325 MG/1
650 TABLET ORAL EVERY 4 HOURS PRN
Status: DISCONTINUED | OUTPATIENT
Start: 2024-04-21 | End: 2024-04-24 | Stop reason: HOSPADM

## 2024-04-21 RX ORDER — CEFEPIME HYDROCHLORIDE 2 G/50ML
2000 INJECTION, SOLUTION INTRAVENOUS ONCE
Status: COMPLETED | OUTPATIENT
Start: 2024-04-21 | End: 2024-04-21

## 2024-04-21 RX ORDER — SODIUM CHLORIDE 9 MG/ML
150 INJECTION, SOLUTION INTRAVENOUS CONTINUOUS
Status: DISCONTINUED | OUTPATIENT
Start: 2024-04-21 | End: 2024-04-23

## 2024-04-21 RX ORDER — ONDANSETRON 2 MG/ML
4 INJECTION INTRAMUSCULAR; INTRAVENOUS EVERY 6 HOURS PRN
Status: DISCONTINUED | OUTPATIENT
Start: 2024-04-21 | End: 2024-04-24 | Stop reason: HOSPADM

## 2024-04-21 RX ORDER — ENOXAPARIN SODIUM 100 MG/ML
40 INJECTION SUBCUTANEOUS 2 TIMES DAILY
Status: DISCONTINUED | OUTPATIENT
Start: 2024-04-21 | End: 2024-04-21

## 2024-04-21 RX ORDER — HYDROMORPHONE HCL/PF 1 MG/ML
1 SYRINGE (ML) INJECTION EVERY 4 HOURS PRN
Status: DISCONTINUED | OUTPATIENT
Start: 2024-04-21 | End: 2024-04-24 | Stop reason: HOSPADM

## 2024-04-21 RX ORDER — CEFEPIME HYDROCHLORIDE 2 G/1
2000 INJECTION, POWDER, FOR SOLUTION INTRAVENOUS EVERY 12 HOURS
Status: DISCONTINUED | OUTPATIENT
Start: 2024-04-22 | End: 2024-04-21 | Stop reason: RX

## 2024-04-21 RX ORDER — HYDROMORPHONE HCL/PF 1 MG/ML
1 SYRINGE (ML) INJECTION ONCE
Status: COMPLETED | OUTPATIENT
Start: 2024-04-21 | End: 2024-04-21

## 2024-04-21 RX ORDER — CEFEPIME HYDROCHLORIDE 2 G/50ML
2000 INJECTION, SOLUTION INTRAVENOUS EVERY 12 HOURS
Status: DISCONTINUED | OUTPATIENT
Start: 2024-04-22 | End: 2024-04-24 | Stop reason: HOSPADM

## 2024-04-21 RX ORDER — TAMSULOSIN HYDROCHLORIDE 0.4 MG/1
0.4 CAPSULE ORAL
Status: DISCONTINUED | OUTPATIENT
Start: 2024-04-22 | End: 2024-04-24 | Stop reason: HOSPADM

## 2024-04-21 RX ADMIN — CEFEPIME HYDROCHLORIDE 2000 MG: 2 INJECTION, SOLUTION INTRAVENOUS at 18:27

## 2024-04-21 RX ADMIN — HYDROMORPHONE HYDROCHLORIDE 1 MG: 1 INJECTION, SOLUTION INTRAMUSCULAR; INTRAVENOUS; SUBCUTANEOUS at 17:57

## 2024-04-21 RX ADMIN — SODIUM CHLORIDE 150 ML/HR: 0.9 INJECTION, SOLUTION INTRAVENOUS at 21:43

## 2024-04-21 RX ADMIN — SODIUM CHLORIDE 1000 ML: 0.9 INJECTION, SOLUTION INTRAVENOUS at 17:45

## 2024-04-21 RX ADMIN — HYDROMORPHONE HYDROCHLORIDE 1 MG: 1 INJECTION, SOLUTION INTRAMUSCULAR; INTRAVENOUS; SUBCUTANEOUS at 19:54

## 2024-04-21 NOTE — ED PROVIDER NOTES
History  Chief Complaint   Patient presents with    Flank Pain     Started about noon today , blood in urine today      Patient is a 53-year-old male presents for evaluation of right flank and right abdominal pain.  Patient says that symptoms started around noon today.  He says that they have waxed and waned intensity.  Associated with nausea and vomiting.  Says that he noticed some blood in his urine starting today as well.  History of kidney stones and says it feels similar to that.  Denies any fevers or chills.        Prior to Admission Medications   Prescriptions Last Dose Informant Patient Reported? Taking?   ergocalciferol (Drisdol) 1.25 MG (33164 UT) capsule Past Month  No Yes   Sig: Take 1 capsule (50,000 Units total) by mouth once a week   pravastatin (PRAVACHOL) 10 mg tablet Past Month Self Yes Yes   Sig: Take 20 mg by mouth daily      Facility-Administered Medications: None       Past Medical History:   Diagnosis Date    Ileus (HCC) 04/01/2022    Ulcerative colitis (HCC)        Past Surgical History:   Procedure Laterality Date    COLOSTOMY  2005    FL RETROGRADE PYELOGRAM  4/22/2024    ILEOSTOMY Right 2007    WI CYSTO BLADDER W/URETERAL CATHETERIZATION Right 4/22/2024    Procedure: CYSTOSCOPY RETROGRADE PYELOGRAM WITH INSERTION STENT URETERAL;  Surgeon: Gualberto Mosley MD;  Location: [facility];  Service: Urology       Family History   Problem Relation Age of Onset    Alcohol abuse Mother     Alzheimer's disease Father     Cancer Sister      I have reviewed and agree with the history as documented.    E-Cigarette/Vaping     E-Cigarette/Vaping Substances    Nicotine No     THC No     CBD No     Flavoring No     Other No     Unknown No      Social History     Tobacco Use    Smoking status: Never    Smokeless tobacco: Never   Substance Use Topics    Alcohol use: Not Currently     Comment: social    Drug use: Never       Review of Systems   Constitutional:  Negative for chills, fever and unexpected weight  change.   HENT:  Negative for congestion, sore throat and trouble swallowing.    Eyes:  Negative for pain, discharge and itching.   Respiratory:  Negative for cough, chest tightness, shortness of breath and wheezing.    Cardiovascular:  Negative for chest pain, palpitations and leg swelling.   Gastrointestinal:  Negative for abdominal pain, blood in stool, diarrhea, nausea and vomiting.   Endocrine: Negative for polyuria.   Genitourinary:  Positive for flank pain and hematuria. Negative for difficulty urinating, dysuria and frequency.   Musculoskeletal:  Negative for arthralgias and back pain.   Skin:  Negative for color change and rash.   Neurological:  Negative for dizziness, syncope, weakness, light-headedness and headaches.       Physical Exam  Physical Exam  Vitals and nursing note reviewed.   Constitutional:       General: He is not in acute distress.     Appearance: He is well-developed.   HENT:      Head: Normocephalic and atraumatic.      Right Ear: External ear normal.      Left Ear: External ear normal.   Eyes:      Conjunctiva/sclera: Conjunctivae normal.      Pupils: Pupils are equal, round, and reactive to light.   Cardiovascular:      Rate and Rhythm: Normal rate and regular rhythm.      Heart sounds: Normal heart sounds. No murmur heard.     No friction rub. No gallop.   Pulmonary:      Effort: Pulmonary effort is normal. No respiratory distress.      Breath sounds: Normal breath sounds. No wheezing or rales.   Abdominal:      General: Bowel sounds are normal. There is no distension.      Palpations: Abdomen is soft.      Tenderness: There is no abdominal tenderness. There is right CVA tenderness. There is no guarding.      Comments: Colostomy in place  Normal drainage  No redness  No tenderness at the site   Musculoskeletal:         General: No swelling, tenderness or deformity. Normal range of motion.      Cervical back: Normal range of motion.   Lymphadenopathy:      Cervical: No cervical  adenopathy.   Skin:     General: Skin is warm and dry.   Neurological:      General: No focal deficit present.      Mental Status: He is alert and oriented to person, place, and time. Mental status is at baseline.      Cranial Nerves: No cranial nerve deficit.      Sensory: No sensory deficit.      Motor: No weakness or abnormal muscle tone.   Psychiatric:         Behavior: Behavior normal.         Vital Signs  ED Triage Vitals   Temperature Pulse Respirations Blood Pressure SpO2   04/21/24 1729 04/21/24 1729 04/21/24 1729 04/21/24 1729 04/21/24 1729   (!) 97.1 °F (36.2 °C) 64 18 (!) 192/103 96 %      Temp Source Heart Rate Source Patient Position - Orthostatic VS BP Location FiO2 (%)   04/22/24 0810 04/21/24 1745 04/21/24 1954 04/21/24 1954 --   Temporal Monitor Sitting Left arm       Pain Score       04/21/24 1729       10 - Worst Possible Pain           Vitals:    04/22/24 1345 04/22/24 2159 04/23/24 0656 04/23/24 1508   BP: 129/69 133/76 147/71 110/71   Pulse: 66 73 74    Patient Position - Orthostatic VS: Lying            Visual Acuity  Visual Acuity      Flowsheet Row Most Recent Value   L Pupil Size (mm) 3   R Pupil Size (mm) 3            ED Medications  Medications   tamsulosin (FLOMAX) capsule 0.4 mg (0.4 mg Oral Given 4/23/24 1730)   acetaminophen (TYLENOL) tablet 650 mg (0 mg Oral Hold 4/23/24 0831)   ondansetron (ZOFRAN) injection 4 mg (4 mg Intravenous Given 4/22/24 0907)   HYDROmorphone (DILAUDID) injection 0.5 mg (0.5 mg Intravenous Given 4/22/24 0311)   HYDROmorphone (DILAUDID) injection 1 mg (has no administration in time range)   cefepime (MAXIPIME) IVPB (premix in dextrose) 2,000 mg 50 mL (2,000 mg Intravenous New Bag 4/23/24 2028)   heparin (porcine) subcutaneous injection 7,500 Units (0 Units Subcutaneous Hold 4/23/24 1453)   sodium chloride 0.9 % bolus 1,000 mL (0 mL Intravenous Stopped 4/21/24 2033)   HYDROmorphone (DILAUDID) injection 1 mg (1 mg Intravenous Given 4/21/24 6177)   cefepime  (MAXIPIME) IVPB (premix in dextrose) 2,000 mg 50 mL (0 mg Intravenous Stopped 4/21/24 1909)   HYDROmorphone (DILAUDID) injection 1 mg (1 mg Intravenous Given 4/21/24 1954)   sodium chloride 0.9 % infusion (75 mL/hr Intravenous New Bag 4/23/24 1720)       Diagnostic Studies  Results Reviewed       Procedure Component Value Units Date/Time    Procalcitonin [[id number]]  (Normal) Collected: 04/22/24 0402    Lab Status: Final result Specimen: Blood from Arm, Right Updated: 04/22/24 0432     Procalcitonin 0.13 ng/ml     Basic metabolic panel [[id number]]  (Abnormal) Collected: 04/22/24 0402    Lab Status: Final result Specimen: Blood from Arm, Right Updated: 04/22/24 0428     Sodium 136 mmol/L      Potassium 4.3 mmol/L      Chloride 107 mmol/L      CO2 21 mmol/L      ANION GAP 8 mmol/L      BUN 28 mg/dL      Creatinine 3.06 mg/dL      Glucose 116 mg/dL      Calcium 8.6 mg/dL      eGFR 21 ml/min/1.73sq m     Narrative:      National Kidney Disease Foundation guidelines for Chronic Kidney Disease (CKD):     Stage 1 with normal or high GFR (GFR > 90 mL/min/1.73 square meters)    Stage 2 Mild CKD (GFR = 60-89 mL/min/1.73 square meters)    Stage 3A Moderate CKD (GFR = 45-59 mL/min/1.73 square meters)    Stage 3B Moderate CKD (GFR = 30-44 mL/min/1.73 square meters)    Stage 4 Severe CKD (GFR = 15-29 mL/min/1.73 square meters)    Stage 5 End Stage CKD (GFR <15 mL/min/1.73 square meters)  Note: GFR calculation is accurate only with a steady state creatinine    CBC (With Platelets) [[id number]]  (Abnormal) Collected: 04/22/24 0402    Lab Status: Final result Specimen: Blood from Arm, Right Updated: 04/22/24 0408     WBC 11.48 Thousand/uL      RBC 4.73 Million/uL      Hemoglobin 13.8 g/dL      Hematocrit 42.1 %      MCV 89 fL      MCH 29.2 pg      MCHC 32.8 g/dL      RDW 13.2 %      Platelets 215 Thousands/uL      MPV 9.6 fL     Procalcitonin [[id number]]  (Normal) Collected: 04/21/24 2035    Lab Status: Final result Specimen: Blood  from Arm, Right Updated: 04/21/24 2146     Procalcitonin 0.10 ng/ml     Basic metabolic panel [[id number]]  (Abnormal) Collected: 04/21/24 2035    Lab Status: Final result Specimen: Blood from Arm, Right Updated: 04/21/24 2052     Sodium 137 mmol/L      Potassium 4.8 mmol/L      Chloride 106 mmol/L      CO2 23 mmol/L      ANION GAP 8 mmol/L      BUN 24 mg/dL      Creatinine 2.10 mg/dL      Glucose 113 mg/dL      Calcium 9.1 mg/dL      eGFR 34 ml/min/1.73sq m     Narrative:      National Kidney Disease Foundation guidelines for Chronic Kidney Disease (CKD):     Stage 1 with normal or high GFR (GFR > 90 mL/min/1.73 square meters)    Stage 2 Mild CKD (GFR = 60-89 mL/min/1.73 square meters)    Stage 3A Moderate CKD (GFR = 45-59 mL/min/1.73 square meters)    Stage 3B Moderate CKD (GFR = 30-44 mL/min/1.73 square meters)    Stage 4 Severe CKD (GFR = 15-29 mL/min/1.73 square meters)    Stage 5 End Stage CKD (GFR <15 mL/min/1.73 square meters)  Note: GFR calculation is accurate only with a steady state creatinine    RBC Morphology Reflex Test [[id number]] Collected: 04/21/24 1740    Lab Status: Final result Specimen: Blood from Arm, Right Updated: 04/21/24 1902    Comprehensive metabolic panel [[id number]]  (Abnormal) Collected: 04/21/24 1824    Lab Status: Final result Specimen: Blood from Arm, Right Updated: 04/21/24 1843     Sodium 137 mmol/L      Potassium 4.4 mmol/L      Chloride 106 mmol/L      CO2 23 mmol/L      ANION GAP 8 mmol/L      BUN 23 mg/dL      Creatinine 1.91 mg/dL      Glucose 109 mg/dL      Calcium 9.3 mg/dL      AST 17 U/L      ALT 12 U/L      Alkaline Phosphatase 63 U/L      Total Protein 7.3 g/dL      Albumin 4.3 g/dL      Total Bilirubin 0.49 mg/dL      eGFR 38 ml/min/1.73sq m     Narrative:      National Kidney Disease Foundation guidelines for Chronic Kidney Disease (CKD):     Stage 1 with normal or high GFR (GFR > 90 mL/min/1.73 square meters)    Stage 2 Mild CKD (GFR = 60-89 mL/min/1.73 square  meters)    Stage 3A Moderate CKD (GFR = 45-59 mL/min/1.73 square meters)    Stage 3B Moderate CKD (GFR = 30-44 mL/min/1.73 square meters)    Stage 4 Severe CKD (GFR = 15-29 mL/min/1.73 square meters)    Stage 5 End Stage CKD (GFR <15 mL/min/1.73 square meters)  Note: GFR calculation is accurate only with a steady state creatinine    CBC and differential [[id number]]  (Abnormal) Collected: 04/21/24 1740    Lab Status: Final result Specimen: Blood from Arm, Right Updated: 04/21/24 1842     WBC 14.77 Thousand/uL      RBC 5.34 Million/uL      Hemoglobin 15.7 g/dL      Hematocrit 47.2 %      MCV 88 fL      MCH 29.4 pg      MCHC 33.3 g/dL      RDW 13.2 %      MPV 9.7 fL      Platelets 250 Thousands/uL     Narrative:      This is an appended report.  These results have been appended to a previously verified report.    Manual Differential(PHLEBS Do Not Order) [[id number]]  (Abnormal) Collected: 04/21/24 1740    Lab Status: Final result Specimen: Blood from Arm, Right Updated: 04/21/24 1842     Segmented % 87 %      Lymphocytes % 7 %      Monocytes % 6 %      Eosinophils % 0 %      Basophils % 0 %      Absolute Neutrophils 12.85 Thousand/uL      Absolute Lymphocytes 1.03 Thousand/uL      Absolute Monocytes 0.89 Thousand/uL      Absolute Eosinophils 0.00 Thousand/uL      Absolute Basophils 0.00 Thousand/uL      Total Counted --     RBC Morphology Normal     Platelet Estimate Adequate    Urine Microscopic [[id number]]  (Abnormal) Collected: 04/21/24 1741    Lab Status: Final result Specimen: Urine, Clean Catch Updated: 04/21/24 1808     RBC, UA Innumerable /hpf      WBC, UA 10-20 /hpf      Epithelial Cells None Seen /hpf      Bacteria, UA Moderate /hpf     Protime-INR [[id number]]  (Normal) Collected: 04/21/24 1740    Lab Status: Final result Specimen: Blood from Arm, Right Updated: 04/21/24 1802     Protime 13.2 seconds      INR 0.98    APTT [[id number]]  (Normal) Collected: 04/21/24 1740    Lab Status: Final result Specimen:  Blood from Arm, Right Updated: 04/21/24 1802     PTT 31 seconds     UA (URINE) with reflex to Scope [[id number]]  (Abnormal) Collected: 04/21/24 1741    Lab Status: Final result Specimen: Urine, Clean Catch Updated: 04/21/24 1753     Color, UA Brown     Clarity, UA Turbid     Specific Gravity, UA 1.020     pH, UA 6.5     Leukocytes, UA Trace     Nitrite, UA Positive     Protein, UA 3+ mg/dl      Glucose, UA Negative mg/dl      Ketones, UA Trace mg/dl      Urobilinogen, UA 1.0 E.U./dl      Bilirubin, UA 2+     Occult Blood, UA 3+                   XR abdomen 1 view kub   Final Result by Martín Turpin MD (04/23 1307)      Interval placement of right-sided ureteral stent. No stone seen along the course of the ureteral stent.            Workstation performed: [id number]         FL retrograde pyelogram   Final Result by Martín Turpin MD (04/22 1258)      Fluoroscopic guidance provided for right retrograde pyelogram. Please see procedure report for further details.      Workstation performed: [id number]         XR abdomen 1 view kub   Final Result by Martín Turpin MD (04/22 1258)      No radiopaque right renal calculi seen. There is suggestion of faint left renal calculi, better seen on recent CT.            Workstation performed: [id number]         CT abdomen pelvis wo contrast   Final Result by Maximo Zapata MD (04/21 1925)      Mild right hydronephrosis secondary to a punctate calculi in the proximal ureter.      The study was marked in EPIC for immediate notification.      Workstation performed: [id number]                    Procedures  Procedures         ED Course  ED Course as of 04/23/24 2148   Sun Apr 21, 2024 2014 Spoke with patient about lab work and imaging.  Will recheck CMP after IV fluids.   2100 Creatinine went from 1.91-2.1 GFR from 30-34 after liter of normal saline.  Given kidney infection, JENAE, will admit to hospital.                               SBIRT 22yo+      Flowsheet Row  Most Recent Value   Initial Alcohol Screen: US AUDIT-C     1. How often do you have a drink containing alcohol? 0 Filed at: 04/21/2024 1738   2. How many drinks containing alcohol do you have on a typical day you are drinking?  0 Filed at: 04/21/2024 1738   3a. Male UNDER 65: How often do you have five or more drinks on one occasion? 0 Filed at: 04/21/2024 1738   3b. FEMALE Any Age, or MALE 65+: How often do you have 4 or more drinks on one occassion? 0 Filed at: 04/21/2024 1738   Audit-C Score 0 Filed at: 04/21/2024 1738   GORDON: How many times in the past year have you...    Used an illegal drug or used a prescription medication for non-medical reasons? Never Filed at: 04/21/2024 1738                      Medical Decision Making  53-year-old male with history of kidney stones, presenting for evaluation of right flank pain.  Associated with nausea and vomiting.  Noticed blood in urine today.  Feels similar to previous kidney stones.  Differential includes UTI/pyelo-, kidney stone.  Will obtain CBC, CMP.  Obtain UA.  Will obtain CT and pelvis without contrast.  Will give Dilaudid, IV fluids.  CBC shows leukocytosis of 14.  CT shows perinephric stranding and a punctate oculi.  Concern for cute pyelonephritis.  Given patient's worsening JENAE, will admit for IV antibiotics.    Problems Addressed:  JENAE (acute kidney injury) (HCC): acute illness or injury  Pyelonephritis: acute illness or injury  Ureterolithiasis: acute illness or injury    Amount and/or Complexity of Data Reviewed  Labs: ordered.  Radiology: ordered.    Risk  Prescription drug management.  Decision regarding hospitalization.             Disposition  Final diagnoses:   Pyelonephritis   Ureterolithiasis   JENAE (acute kidney injury) (HCC)     Time reflects when diagnosis was documented in both MDM as applicable and the Disposition within this note       Time User Action Codes Description Comment    4/21/2024  9:13 PM Leon Ramos Add [N12] Pyelonephritis      4/21/2024  9:13 PM Leon Ramos Add [N20.1] Ureterolithiasis     4/21/2024  9:13 PM Leon Ramos Add [N17.9] JENAE (acute kidney injury) ([facility])     4/21/2024  9:17 PM Fiona Joshua Add [N13.30] Hydronephrosis, unspecified hydronephrosis type     4/21/2024  9:59 PM Gualberto Mosley Add [N13.2] Hydronephrosis with urinary obstruction due to ureteral calculus     4/22/2024  9:14 AM Uma Harrison Modify [N17.9] JENAE (acute kidney injury) ([facility])     4/22/2024  9:14 AM Uma Harrison Modify [N13.2] Hydronephrosis with urinary obstruction due to ureteral calculus     4/23/2024  1:28 PM Rebeka Jean Modify [N17.9] JENAE (acute kidney injury) ([facility])     4/23/2024  1:28 PM Rebeka Jean Modify [N13.2] Hydronephrosis with urinary obstruction due to ureteral calculus     4/23/2024  1:28 PM Rebeka Jean Add [R03.0] Blood pressure elevated without history of HTN           ED Disposition       ED Disposition   Admit    Condition   Stable    Date/Time   Sun Apr 21, 2024 2113    Comment   Case was discussed with MIKE and the patient's admission status was agreed to be Admission Status: inpatient status to the service of Dr. Batista .               Follow-up Information    None         Current Discharge Medication List        CONTINUE these medications which have NOT CHANGED    Details   ergocalciferol (Drisdol) 1.25 MG (49128 UT) capsule Take 1 capsule (50,000 Units total) by mouth once a week  Qty: 12 capsule, Refills: 1    Associated Diagnoses: Vitamin D deficiency      pravastatin (PRAVACHOL) 10 mg tablet Take 20 mg by mouth daily                 PDMP Review       None            ED Provider  Electronically Signed by             Leon Ramos DO  04/23/24 1075

## 2024-04-22 ENCOUNTER — ANESTHESIA (INPATIENT)
Dept: PERIOP | Facility: HOSPITAL | Age: 54
DRG: 660 | End: 2024-04-22
Payer: COMMERCIAL

## 2024-04-22 ENCOUNTER — APPOINTMENT (INPATIENT)
Dept: RADIOLOGY | Facility: HOSPITAL | Age: 54
DRG: 660 | End: 2024-04-22
Payer: COMMERCIAL

## 2024-04-22 ENCOUNTER — ANESTHESIA EVENT (INPATIENT)
Dept: PERIOP | Facility: HOSPITAL | Age: 54
DRG: 660 | End: 2024-04-22
Payer: COMMERCIAL

## 2024-04-22 LAB
ANION GAP SERPL CALCULATED.3IONS-SCNC: 8 MMOL/L (ref 4–13)
BACTERIA UR QL AUTO: ABNORMAL /HPF
BILIRUB UR QL STRIP: NEGATIVE
BUN SERPL-MCNC: 28 MG/DL (ref 5–25)
CALCIUM SERPL-MCNC: 8.6 MG/DL (ref 8.4–10.2)
CHLORIDE SERPL-SCNC: 107 MMOL/L (ref 96–108)
CLARITY UR: ABNORMAL
CO2 SERPL-SCNC: 21 MMOL/L (ref 21–32)
COLOR UR: ABNORMAL
CREAT SERPL-MCNC: 3.06 MG/DL (ref 0.6–1.3)
ERYTHROCYTE [DISTWIDTH] IN BLOOD BY AUTOMATED COUNT: 13.2 % (ref 11.6–15.1)
GFR SERPL CREATININE-BSD FRML MDRD: 21 ML/MIN/1.73SQ M
GLUCOSE SERPL-MCNC: 116 MG/DL (ref 65–140)
GLUCOSE UR STRIP-MCNC: NEGATIVE MG/DL
HCT VFR BLD AUTO: 42.1 % (ref 36.5–49.3)
HGB BLD-MCNC: 13.8 G/DL (ref 12–17)
HGB UR QL STRIP.AUTO: ABNORMAL
KETONES UR STRIP-MCNC: NEGATIVE MG/DL
LEUKOCYTE ESTERASE UR QL STRIP: NEGATIVE
MCH RBC QN AUTO: 29.2 PG (ref 26.8–34.3)
MCHC RBC AUTO-ENTMCNC: 32.8 G/DL (ref 31.4–37.4)
MCV RBC AUTO: 89 FL (ref 82–98)
NITRITE UR QL STRIP: NEGATIVE
NON-SQ EPI CELLS URNS QL MICRO: ABNORMAL /HPF
PH UR STRIP.AUTO: 6 [PH]
PLATELET # BLD AUTO: 215 THOUSANDS/UL (ref 149–390)
PMV BLD AUTO: 9.6 FL (ref 8.9–12.7)
POTASSIUM SERPL-SCNC: 4.3 MMOL/L (ref 3.5–5.3)
PROCALCITONIN SERPL-MCNC: 0.13 NG/ML
PROT UR STRIP-MCNC: NEGATIVE MG/DL
RBC # BLD AUTO: 4.73 MILLION/UL (ref 3.88–5.62)
RBC #/AREA URNS AUTO: ABNORMAL /HPF
SODIUM SERPL-SCNC: 136 MMOL/L (ref 135–147)
SP GR UR STRIP.AUTO: 1.01
UROBILINOGEN UR QL STRIP.AUTO: 0.2 E.U./DL
WBC # BLD AUTO: 11.48 THOUSAND/UL (ref 4.31–10.16)
WBC #/AREA URNS AUTO: ABNORMAL /HPF

## 2024-04-22 PROCEDURE — 87086 URINE CULTURE/COLONY COUNT: CPT | Performed by: UROLOGY

## 2024-04-22 PROCEDURE — 84145 PROCALCITONIN (PCT): CPT | Performed by: PHYSICIAN ASSISTANT

## 2024-04-22 PROCEDURE — 81001 URINALYSIS AUTO W/SCOPE: CPT | Performed by: UROLOGY

## 2024-04-22 PROCEDURE — 80048 BASIC METABOLIC PNL TOTAL CA: CPT | Performed by: PHYSICIAN ASSISTANT

## 2024-04-22 PROCEDURE — 74018 RADEX ABDOMEN 1 VIEW: CPT

## 2024-04-22 PROCEDURE — 0T768DZ DILATION OF RIGHT URETER WITH INTRALUMINAL DEVICE, VIA NATURAL OR ARTIFICIAL OPENING ENDOSCOPIC: ICD-10-PCS | Performed by: UROLOGY

## 2024-04-22 PROCEDURE — 81003 URINALYSIS AUTO W/O SCOPE: CPT | Performed by: UROLOGY

## 2024-04-22 PROCEDURE — BT1D1ZZ FLUOROSCOPY OF RIGHT KIDNEY, URETER AND BLADDER USING LOW OSMOLAR CONTRAST: ICD-10-PCS | Performed by: UROLOGY

## 2024-04-22 PROCEDURE — C1758 CATHETER, URETERAL: HCPCS | Performed by: UROLOGY

## 2024-04-22 PROCEDURE — 85027 COMPLETE CBC AUTOMATED: CPT | Performed by: PHYSICIAN ASSISTANT

## 2024-04-22 PROCEDURE — C1769 GUIDE WIRE: HCPCS | Performed by: UROLOGY

## 2024-04-22 PROCEDURE — C2617 STENT, NON-COR, TEM W/O DEL: HCPCS | Performed by: UROLOGY

## 2024-04-22 PROCEDURE — 36415 COLL VENOUS BLD VENIPUNCTURE: CPT | Performed by: PHYSICIAN ASSISTANT

## 2024-04-22 PROCEDURE — 74420 UROGRAPHY RTRGR +-KUB: CPT

## 2024-04-22 PROCEDURE — 99232 SBSQ HOSP IP/OBS MODERATE 35: CPT | Performed by: INTERNAL MEDICINE

## 2024-04-22 DEVICE — INLAY OPTIMA URETERAL STENT W/O GUIDEWIRE
Type: IMPLANTABLE DEVICE | Site: URETER | Status: FUNCTIONAL
Brand: BARD® INLAY OPTIMA® URETERAL STENT

## 2024-04-22 RX ORDER — FENTANYL CITRATE 50 UG/ML
INJECTION, SOLUTION INTRAMUSCULAR; INTRAVENOUS AS NEEDED
Status: DISCONTINUED | OUTPATIENT
Start: 2024-04-22 | End: 2024-04-22

## 2024-04-22 RX ORDER — PROPOFOL 10 MG/ML
INJECTION, EMULSION INTRAVENOUS AS NEEDED
Status: DISCONTINUED | OUTPATIENT
Start: 2024-04-22 | End: 2024-04-22

## 2024-04-22 RX ORDER — MIDAZOLAM HYDROCHLORIDE 2 MG/2ML
INJECTION, SOLUTION INTRAMUSCULAR; INTRAVENOUS AS NEEDED
Status: DISCONTINUED | OUTPATIENT
Start: 2024-04-22 | End: 2024-04-22

## 2024-04-22 RX ORDER — FENTANYL CITRATE/PF 50 MCG/ML
25 SYRINGE (ML) INJECTION
Status: DISCONTINUED | OUTPATIENT
Start: 2024-04-22 | End: 2024-04-22 | Stop reason: HOSPADM

## 2024-04-22 RX ORDER — SODIUM CHLORIDE 9 MG/ML
INJECTION, SOLUTION INTRAVENOUS CONTINUOUS PRN
Status: DISCONTINUED | OUTPATIENT
Start: 2024-04-22 | End: 2024-04-22

## 2024-04-22 RX ORDER — HEPARIN SODIUM 5000 [USP'U]/ML
7500 INJECTION, SOLUTION INTRAVENOUS; SUBCUTANEOUS EVERY 8 HOURS SCHEDULED
Status: DISCONTINUED | OUTPATIENT
Start: 2024-04-22 | End: 2024-04-24 | Stop reason: HOSPADM

## 2024-04-22 RX ORDER — DEXAMETHASONE SODIUM PHOSPHATE 10 MG/ML
INJECTION, SOLUTION INTRAMUSCULAR; INTRAVENOUS AS NEEDED
Status: DISCONTINUED | OUTPATIENT
Start: 2024-04-22 | End: 2024-04-22

## 2024-04-22 RX ORDER — ONDANSETRON 2 MG/ML
4 INJECTION INTRAMUSCULAR; INTRAVENOUS ONCE AS NEEDED
Status: DISCONTINUED | OUTPATIENT
Start: 2024-04-22 | End: 2024-04-22 | Stop reason: HOSPADM

## 2024-04-22 RX ORDER — HEPARIN SODIUM 5000 [USP'U]/ML
5000 INJECTION, SOLUTION INTRAVENOUS; SUBCUTANEOUS EVERY 8 HOURS SCHEDULED
Status: DISCONTINUED | OUTPATIENT
Start: 2024-04-22 | End: 2024-04-22

## 2024-04-22 RX ORDER — MAGNESIUM HYDROXIDE 1200 MG/15ML
LIQUID ORAL AS NEEDED
Status: DISCONTINUED | OUTPATIENT
Start: 2024-04-22 | End: 2024-04-22 | Stop reason: HOSPADM

## 2024-04-22 RX ORDER — LIDOCAINE HYDROCHLORIDE 10 MG/ML
INJECTION, SOLUTION EPIDURAL; INFILTRATION; INTRACAUDAL; PERINEURAL AS NEEDED
Status: DISCONTINUED | OUTPATIENT
Start: 2024-04-22 | End: 2024-04-22

## 2024-04-22 RX ADMIN — ONDANSETRON 4 MG: 2 INJECTION INTRAMUSCULAR; INTRAVENOUS at 09:07

## 2024-04-22 RX ADMIN — DEXAMETHASONE SODIUM PHOSPHATE 10 MG: 10 INJECTION, SOLUTION INTRAMUSCULAR; INTRAVENOUS at 09:06

## 2024-04-22 RX ADMIN — CEFEPIME HYDROCHLORIDE 2000 MG: 2 INJECTION, SOLUTION INTRAVENOUS at 07:38

## 2024-04-22 RX ADMIN — SODIUM CHLORIDE: 0.9 INJECTION, SOLUTION INTRAVENOUS at 08:49

## 2024-04-22 RX ADMIN — FENTANYL CITRATE 50 MCG: 50 INJECTION INTRAMUSCULAR; INTRAVENOUS at 09:08

## 2024-04-22 RX ADMIN — HYDROMORPHONE HYDROCHLORIDE 0.5 MG: 1 INJECTION, SOLUTION INTRAMUSCULAR; INTRAVENOUS; SUBCUTANEOUS at 03:11

## 2024-04-22 RX ADMIN — SODIUM CHLORIDE: 0.9 INJECTION, SOLUTION INTRAVENOUS at 09:13

## 2024-04-22 RX ADMIN — PROPOFOL 200 MG: 10 INJECTION, EMULSION INTRAVENOUS at 08:53

## 2024-04-22 RX ADMIN — SODIUM CHLORIDE 150 ML/HR: 0.9 INJECTION, SOLUTION INTRAVENOUS at 03:57

## 2024-04-22 RX ADMIN — LIDOCAINE HYDROCHLORIDE 50 MG: 10 INJECTION, SOLUTION EPIDURAL; INFILTRATION; INTRACAUDAL; PERINEURAL at 08:53

## 2024-04-22 RX ADMIN — HEPARIN SODIUM 7500 UNITS: 5000 INJECTION, SOLUTION INTRAVENOUS; SUBCUTANEOUS at 21:25

## 2024-04-22 RX ADMIN — FENTANYL CITRATE 50 MCG: 50 INJECTION INTRAMUSCULAR; INTRAVENOUS at 08:58

## 2024-04-22 RX ADMIN — CEFEPIME HYDROCHLORIDE 2000 MG: 2 INJECTION, SOLUTION INTRAVENOUS at 19:08

## 2024-04-22 RX ADMIN — TAMSULOSIN HYDROCHLORIDE 0.4 MG: 0.4 CAPSULE ORAL at 17:10

## 2024-04-22 RX ADMIN — MIDAZOLAM 2 MG: 1 INJECTION INTRAMUSCULAR; INTRAVENOUS at 08:45

## 2024-04-22 NOTE — ASSESSMENT & PLAN NOTE
Patient with flank pain and blood in urine  CT: Mild right hydronephrosis secondary to a punctate calculi in the proximal ureter  Urology was consulted  Patient was taken for ureteral stent placement on 4/22/2024  Continue IV fluids as needed  Continue antibiotics, follow-up cultures  Pain control as needed  Repeat BMP in the morning to monitor kidney function

## 2024-04-22 NOTE — ANESTHESIA POSTPROCEDURE EVALUATION
Post-Op Assessment Note    CV Status:  Stable  Pain Score: 0    Pain management: adequate       Mental Status:  Awake and sleepy   Hydration Status:  Euvolemic   PONV Controlled:  Controlled   Airway Patency:  Patent     Post Op Vitals Reviewed: Yes    No anethesia notable event occurred.    Staff: DELVIS           /72 (04/22/24 0936)    Temp 97.6 °F (36.4 °C) (04/22/24 0936)    Pulse 77 (04/22/24 0936)   Resp 16 (04/22/24 0935)    SpO2 98 % (04/22/24 0936)

## 2024-04-22 NOTE — ASSESSMENT & PLAN NOTE
BMI 45  Healthy diet and lifestyle modification recommended   Pulmonary / Critical Care Progress Note      Patient Name: Cristi Maloney  : 1943  MRN: [id number]  Primary Care Physician:  Gonzalo Nugent MD  Date of admission: 2023    Subjective   Subjective   Follow-up for multifocal pneumonia, acute hypoxic respiratory failure, COPD    No acute events overnight.    This morning,  Lying in bed awake on room air  Family at bedside  Reports feeling better  Breathing improved  Productive cough with scant amount of sputum  No fever/chills  No chest pain  Remains weak and fatigued    Review of Systems  General: Fatigue, otherwise denied complaints  HEENT: Denied complaints  Respiratory: REEVES, cough, otherwise denied complaints  Cardiovascular: Denied complaints  GI: Denied complaints  : Denied complaints  MSK: Weak, denied complaints    Objective   Objective     Vitals:   Temp:  [97.7 °F (36.5 °C)-98.6 °F (37 °C)] 98 °F (36.7 °C)  Heart Rate:  [82-96] 92  Resp:  [18-20] 18  BP: (119-138)/(52-58) 130/53  Flow (L/min):  [1-2] 1     Physical Exam   Vital Signs Reviewed   General:  Alert, NAD. Lying in bed   HEENT:  PERRL, EOMI.    Neck:  No JVD, no thyromegaly  Lymph: no axillary, cervical, supraclavicular lymphadenopathy noted bilaterally  Chest:  Clear to auscultation bilaterally, no work of breathing noted on room air  CV: RRR, no M/G/R, pulses 2+  Abd:  Soft, NT, ND, +BS  EXT:  no clubbing, no cyanosis, no edema  Neuro:  A&Ox3, CN grossly intact, no focal deficits.  Skin: No rashes or lesions noted    Result Review    Result Review:  I have personally reviewed the results from the time of this admission to 2023 12:46 EDT and agree with these findings:  [x]  Laboratory  [x]  Microbiology  [x]  Radiology  []  EKG/Telemetry   []  Cardiology/Vascular   []  Pathology  []  Old records  []  Other:  Most notable findings include:    Strep pneumo positive    Pro-David - 10.64 --> 1.88  proBNP - 2283     CT chest -no pulmonary emboli, extensive consolidation in  bilateral lower lobes, advanced emphysematous lung disease, left hilar lymphadenopathy with densely calcified nodes, small nodes present in right hilum, no mediastinal lymphadenopathy    4/25 CXR -favorable progression with improved aeration of left lung base        Lab 04/26/23  0438 04/25/23  0532 04/24/23  0440 04/23/23  0236 04/22/23  0335 04/21/23 2022 04/21/23  1556 04/21/23  1043 04/21/23  0356 04/20/23  2349 04/20/23  0321 04/19/23  2331   WBC 10.33 11.89* 12.07* 10.41 13.02*  --   --   --  14.29*  --   --  16.77*   HEMOGLOBIN 10.9* 11.3* 11.3* 11.5* 10.8*  --   --   --  9.3*  --   --  10.6*   HEMATOCRIT 33.3* 34.0* 33.9* 34.4* 31.5*  --   --   --  27.2*  --   --  30.4*   PLATELETS 273 275 256 239 203  --   --   --  183  --   --  215   SODIUM 136 135* 135* 136 137 134* 135*   < > 139 140   < > 144  144   POTASSIUM 4.0 4.0 3.7 4.0 4.4 4.3 4.6   < > 4.0 3.7   < > 4.6  4.6   CHLORIDE 101 100 99 100 103 103 105   < > 111* 113*   < > 118*  118*   CO2 28.5 26.7 27.1 24.2 20.6* 18.1* 16.6*   < > 18.0* 19.4*   < > 16.9*  16.9*   BUN 16 16 13 14 15 15 14   < > 15 16   < > 28*  28*   CREATININE 0.52* 0.58* 0.56* 0.52* 0.53* 0.51* 0.55*   < > 0.53* 0.60*   < > 0.82  0.82   GLUCOSE 179* 189* 255* 187* 145* 166* 169*   < > 145* 124*   < > 135*  135*   CALCIUM 8.8 9.1 9.2 9.3 9.2 8.7 8.7   < > 8.3* 8.5*   < > 8.4*  8.4*   PHOSPHORUS 2.8 2.6 3.6 3.9 3.1 2.6 2.4*   < > 2.3* 2.8   < > 2.6   URIC ACID  --  3.0*  --   --   --   --   --   --   --   --   --   --    TOTAL PROTEIN 5.7* 6.1 5.9* 6.1 5.6*  --   --   --   --  4.8*  --  5.0*   ALBUMIN 2.8* 2.8* 2.7* 2.7* 2.5*  --   --   --   --  2.1*  --  2.4*   GLOBULIN 2.9  --   --   --   --   --   --   --   --   --   --  2.6    < > = values in this interval not displayed.       Assessment & Plan   Assessment / Plan     Active Hospital Problems:  Active Hospital Problems    Diagnosis    • **Sepsis    • Chronic systolic heart failure (CMS/HCC)      Impression:  Multifocal  pneumonia concern due to pneumococcal pneumonia  Acute hypoxic respiratory failure  COPD with centrilobular emphysema  Anion gap metabolic acidosis  Acute kidney injury likely prerenal  Hypokalemia  Hypophosphatemia  Altered mental status likely metabolic encephalopathy from sepsis    Plan:  -Currently on room air, may use O2 to maintain SPO2 greater than 90%  -Completed 7-day course of ceftriaxone for pneumococcal pneumonia  -Completed 3-day course of azithromycin  -Continue Xopenex, Brovana and Pulmicort added  -Continue bronchopulmonary hygiene  -Continue MetaNebs and saline nebs  -Encourage I-S/flutter valve  -PT/OT on board, appreciate assistance  -Encourage mobilization, out of bed to chair  -We will need pulmonary follow-up in 2 weeks  -RT  consulted to arrange for outpatient PFTs  -Okay to discharge to rehab from pulmonary standpoint. Will sign off for now, please call with questions.     DVT prophylaxis:  Medical and mechanical DVT prophylaxis orders are present.    CODE STATUS:   Level Of Support Discussed With: Patient  Code Status (Patient has no pulse and is not breathing): CPR (Attempt to Resuscitate)  Medical Interventions (Patient has pulse or is breathing): Full Support  Release to patient: Routine Release    This patient was seen by both a physician and a NP. IGeneva MD, spent >50% of time in accordance with split shared billing. This included personally reviewing all pertinent labs, imaging, microbiology and documentation. Also discussing the case with the patient and any available family, the admitting physician and any available ancillary staff.     Electronically signed by Geneva Boles MD, 04/26/23, 1:02 PM EDT.

## 2024-04-22 NOTE — ANESTHESIA PREPROCEDURE EVALUATION
Procedure:  CYSTOSCOPY RETROGRADE PYELOGRAM WITH INSERTION STENT URETERAL (Right: Bladder)    Obstructive uropathy and JENAE Cr 3.06 - on cefepime did get 7:38 infusion this am     Holter - predominant sinus with high buden on PVCs 20%  ECG - NSR with LAFB and poor R wave progression     Obese class III    Denies the following: CP/SOB with exertion, asthma, COPD, IRENE, stroke/TIA, seizure    Relevant Problems   /RENAL   (+) Acute kidney injury (nontraumatic) (HCC)   (+) Acute pyelonephritis   (+) Hydronephrosis with urterocalculus   (+) Stage 3a chronic kidney disease (HCC)        Physical Exam    Airway    Mallampati score: II  TM Distance: >3 FB  Neck ROM: full     Dental        Cardiovascular      Pulmonary      Other Findings        Anesthesia Plan  ASA Score- 3     Anesthesia Type- general with ASA Monitors.         Additional Monitors:     Airway Plan: LMA.           Plan Factors-Exercise tolerance (METS): >4 METS.    Chart reviewed. EKG reviewed.  Existing labs reviewed. Patient summary reviewed.    Patient is not a current smoker.      Obstructive sleep apnea risk education given perioperatively.        Induction-     Postoperative Plan-     Informed Consent- Anesthetic plan and risks discussed with patient.  I personally reviewed this patient with the CRNA. Discussed and agreed on the Anesthesia Plan with the CRNA..

## 2024-04-22 NOTE — ASSESSMENT & PLAN NOTE
UA trace leuks, nitrite positive +blood, micro 10-20 wbc  IV rocephin  Status post ureteral stent placement on 4/22/2024  Follow up urine culture

## 2024-04-22 NOTE — PLAN OF CARE
Problem: DISCHARGE PLANNING  Goal: Discharge to home or other facility with appropriate resources  Description: INTERVENTIONS:  - Identify barriers to discharge w/patient and caregiver  - Arrange for needed discharge resources and transportation as appropriate  - Identify discharge learning needs (meds, wound care, etc.)  - Refer to Case Management Department for coordinating discharge planning if the patient needs post-hospital services based on physician/advanced practitioner order or complex needs related to functional status, cognitive ability, or social support system  Outcome: Progressing     Problem: Knowledge Deficit  Goal: Patient/family/caregiver demonstrates understanding of disease process, treatment plan, medications, and discharge instructions  Description: Complete learning assessment and assess knowledge base.  Interventions:  - Provide teaching at level of understanding  - Provide teaching via preferred learning methods  Outcome: Progressing     Problem: PAIN - ADULT  Goal: Verbalizes/displays adequate comfort level or baseline comfort level  Description: Interventions:  - Encourage patient to monitor pain and request assistance  - Assess pain using appropriate pain scale  - Administer analgesics based on type and severity of pain and evaluate response  - Implement non-pharmacological measures as appropriate and evaluate response  - Consider cultural and social influences on pain and pain management  - Notify physician/advanced practitioner if interventions unsuccessful or patient reports new pain  Outcome: Progressing

## 2024-04-22 NOTE — PROGRESS NOTES
[facility]  Progress Note  Name: Fredrick Zamudio I  MRN: [id number]  Unit/Bed#: -01 I Date of Admission: 4/21/2024   Date of Service: 4/22/2024 I Hospital Day: 1    Assessment/Plan   * Hydronephrosis with urterocalculus  Assessment & Plan  Patient with flank pain and blood in urine  CT: Mild right hydronephrosis secondary to a punctate calculi in the proximal ureter  Urology was consulted  Patient was taken for ureteral stent placement on 4/22/2024  Continue IV fluids as needed  Continue antibiotics, follow-up cultures  Pain control as needed  Repeat BMP in the morning to monitor kidney function    Acute pyelonephritis  Assessment & Plan  UA trace leuks, nitrite positive +blood, micro 10-20 wbc  IV rocephin  Status post ureteral stent placement on 4/22/2024  Follow up urine culture    Acute kidney injury (nontraumatic) ([facility])  Assessment & Plan  Patient with JENAE w/ mild hydronephrosis and calculi in ureter  Baseline creat 1.3 w/ CKD3a  Patient had ureteral stent placed on 4/22/2024  Continue IV fluids as needed  Avoid nephrotoxic meds  Repeat BMP in the morning    Morbid obesity with BMI of 40.0-44.9, adult ([facility])  Assessment & Plan  BMI 45  Healthy diet and lifestyle modification recommended             VTE Pharmacologic Prophylaxis: VTE Score: 4 Moderate Risk (Score 3-4) - Pharmacological DVT Prophylaxis Ordered: heparin.    Mobility:   Basic Mobility Inpatient Raw Score: 24  JH-HLM Goal: 8: Walk 250 feet or more  JH-HLM Achieved: 8: Walk 250 feet ot more  JH-HLM Goal achieved. Continue to encourage appropriate mobility.    Patient Centered Rounds: I performed bedside rounds with nursing staff today.   Discussions with Specialists or Other Care Team Provider: yes    Education and Discussions with Family / Patient: Updated  (wife) at bedside.    Current Length of Stay: 1 day(s)  Current Patient Status: Inpatient   Certification Statement: The patient will continue to  require additional inpatient hospital stay due to yes  Discharge Plan: Anticipate discharge in 24-48 hrs to home.    Code Status: Level 1 - Full Code    Subjective:   Ureteral stent placed today.  Tolerated procedure.    Objective:     Vitals:   Temp (24hrs), Av.8 °F (36.6 °C), Min:97.1 °F (36.2 °C), Max:98.1 °F (36.7 °C)    Temp:  [97.1 °F (36.2 °C)-98.1 °F (36.7 °C)] 98.1 °F (36.7 °C)  HR:  [62-83] 67  Resp:  [16-20] 20  BP: (144-192)/() 158/100  SpO2:  [94 %-99 %] 94 %  Body mass index is 45.92 kg/m².     Input and Output Summary (last 24 hours):     Intake/Output Summary (Last 24 hours) at 2024 1126  Last data filed at 2024 1101  Gross per 24 hour   Intake 2050 ml   Output 575 ml   Net 1475 ml       Physical Exam:   Physical Exam  Constitutional:       General: He is not in acute distress.     Appearance: He is obese.   HENT:      Head: Normocephalic and atraumatic.      Nose: Nose normal.      Mouth/Throat:      Mouth: Mucous membranes are moist.   Eyes:      Extraocular Movements: Extraocular movements intact.      Conjunctiva/sclera: Conjunctivae normal.   Cardiovascular:      Rate and Rhythm: Normal rate and regular rhythm.   Pulmonary:      Effort: Pulmonary effort is normal. No respiratory distress.   Abdominal:      Palpations: Abdomen is soft.      Tenderness: There is no abdominal tenderness.   Musculoskeletal:         General: Normal range of motion.      Cervical back: Normal range of motion and neck supple.   Skin:     General: Skin is warm and dry.   Neurological:      General: No focal deficit present.      Mental Status: He is alert. Mental status is at baseline.      Cranial Nerves: No cranial nerve deficit.   Psychiatric:         Mood and Affect: Mood normal.         Behavior: Behavior normal.          Additional Data:     Labs:  Results from last 7 days   Lab Units 24  0402 24  1740   WBC Thousand/uL 11.48* 14.77*   HEMOGLOBIN g/dL 13.8 15.7   HEMATOCRIT % 42.1  47.2   PLATELETS Thousands/uL 215 250   LYMPHO PCT %  --  7*   MONO PCT %  --  6   EOS PCT %  --  0     Results from last 7 days   Lab Units 04/22/24 0402 04/21/24 2035 04/21/24  1824   SODIUM mmol/L 136   < > 137   POTASSIUM mmol/L 4.3   < > 4.4   CHLORIDE mmol/L 107   < > 106   CO2 mmol/L 21   < > 23   BUN mg/dL 28*   < > 23   CREATININE mg/dL 3.06*   < > 1.91*   ANION GAP mmol/L 8   < > 8   CALCIUM mg/dL 8.6   < > 9.3   ALBUMIN g/dL  --   --  4.3   TOTAL BILIRUBIN mg/dL  --   --  0.49   ALK PHOS U/L  --   --  63   ALT U/L  --   --  12   AST U/L  --   --  17   GLUCOSE RANDOM mg/dL 116   < > 109    < > = values in this interval not displayed.     Results from last 7 days   Lab Units 04/21/24  1740   INR  0.98             Results from last 7 days   Lab Units 04/22/24 0402 04/21/24 2035   PROCALCITONIN ng/ml 0.13 0.10       Lines/Drains:  Invasive Devices       Peripheral Intravenous Line  Duration             Peripheral IV 04/21/24 Right Antecubital <1 day              Drain  Duration             Colostomy RLQ -- days                    Imaging: No pertinent imaging reviewed.    Recent Cultures (last 7 days):         Last 24 Hours Medication List:   Current Facility-Administered Medications   Medication Dose Route Frequency Provider Last Rate    acetaminophen  650 mg Oral Q4H PRN Fiona Joshua PA-C      cefepime  2,000 mg Intravenous Q12H Fiona Joshua PA-C 2,000 mg (04/22/24 0738)    HYDROmorphone  0.5 mg Intravenous Q4H PRN Fiona Joshua PA-C      HYDROmorphone  1 mg Intravenous Q4H PRN Fiona Joshua PA-C      ondansetron  4 mg Intravenous Q6H PRN Fiona Joshua PA-C      sodium chloride  150 mL/hr Intravenous Continuous Fiona Joshua PA-C 150 mL/hr (04/22/24 0416)    tamsulosin  0.4 mg Oral Daily With Dinner Fiona Joshua PA-C          Today, Patient Was Seen By: Rebeka Jean MD    **Please Note: This note may have been  constructed using a voice recognition system.**

## 2024-04-22 NOTE — ASSESSMENT & PLAN NOTE
Patient with flank pain and blood in urine  IVF  Pain control  Flomax  Urology consult  Plan for stent in AM  CT: Mild right hydronephrosis secondary to a punctate calculi in the proximal ureter.

## 2024-04-22 NOTE — ED NOTES
Patient placed in hospital bed for comfort while being in the emergency department       Daly Covington RN  04/21/24 1242

## 2024-04-22 NOTE — ANESTHESIA POSTPROCEDURE EVALUATION
Post-Op Assessment Note        /72 (04/22/24 0936)    Temp 97.6 °F (36.4 °C) (04/22/24 0936)    Pulse 77 (04/22/24 0936)   Resp 16 (04/22/24 0935)    SpO2 98 % (04/22/24 0936)

## 2024-04-22 NOTE — OP NOTE
OPERATIVE REPORT  PATIENT NAME: Fredrick Zamudio    :  1970  MRN: [id number]  Pt Location: [facility] OR ROOM 02    SURGERY DATE: 2024    Surgeons and Role:     * Gualberto Mosley MD - Primary    Preop Diagnosis:  JENAE (acute kidney injury) (HCC) [N17.9]  Hydronephrosis with urinary obstruction due to ureteral calculus [N13.2]    Post-Op Diagnosis Codes:     * JENAE (acute kidney injury) (HCC) [N17.9]     * Hydronephrosis with urinary obstruction due to ureteral calculus [N13.2]    Procedure(s):  Right - CYSTOSCOPY RETROGRADE PYELOGRAM WITH INSERTION STENT URETERAL    Specimen(s):  ID Type Source Tests Collected by Time Destination   A :  Urine Urinary Bladder URINE CULTURE, URINALYSIS WITH REFLEX TO SCOPE Gualberto Mosley MD 2024 0913    B :  Urine Kidney, Right URINE CULTURE Gualberto Mosley MD 2024 0918        Estimated Blood Loss:   Minimal    Drains:  Colostomy RLQ (Active)   Stoma Shape Round 24 0826   Number of days:        Anesthesia Type:   General    Operative Indications:  JENAE (acute kidney injury) (HCC) [N17.9]  Hydronephrosis with urinary obstruction due to ureteral calculus [N13.2]  This is a 53-year-old male presenting with right renal colic and progressive JENAE secondary to an elongated obstructing proximal right ureteral stone.  Options, procedures, benefits and risks were discussed and he agrees to the planned procedure.    Operative Findings:  Moderate right hydronephrosis    Complications:   None    Procedure and Technique:  The patient was properly identified in the operating room and after adequate general anesthesia was placed in the lithotomy position.  The lower abdomen and genitalia were prepped and draped in the usual fashion.  Cystourethroscopy was performed with 21 Algerian cystoscope using 30 degree lens.  The anterior urethra was normal.  The prostate was mildly enlarged.  The bladder was anterior urine was drained and sent for urinalysis and culture.  Due to the  probability of UTI, the procedure was performed with as little manipulation as possible.  Previous cystoscopy was performed showing no mass or large stone.  There some yellowish tiny sand particles dependently within the bladder.  Visibility was decreased due to cloudiness.  The ureteral orifices were normal bilaterally.  Attempt was made to pass a 0.035 solo guidewire into the right ureteral orifice with the aid of a 5 Macedonian open-ended catheter without success due to angulation.  Therefore a 70 degree lens was utilized with a deflecting element.  The guidewire and ureteral catheter were then easily passed into the right ureteral orifice.  The wire was advanced up the ureter under fluoroscopic guidance easily into the collecting system.  The catheter followed easily into the collecting system.  The wire was removed.  A very rapid drip of clear yellow urine was drained and sent for culture.  A small amount of dilute contrast was instilled outlining a moderately dilated collecting system.  The wire was replaced into the upper pole and the ureteral length was measured.  The catheter was removed.  A 6 Macedonian 28 cm Bard inlay Balta stent was passed over the wire and the wire was removed leaving a good curl of stent within the collecting system and within the bladder.  The bladder was emptied and the cystoscope was removed.  The patient tolerated the procedure well and left the operating room in good condition.   I was present for the entire procedure.    Patient Disposition:  PACU        SIGNATURE: Gualberto Mosley MD  DATE: April 22, 2024  TIME: 9:29 AM

## 2024-04-22 NOTE — ASSESSMENT & PLAN NOTE
Patient with JENAE w/ mild hydronephrosis and calculi in ureter  Baseline creat 1.3 w/ CKD3a  IVF  Monitor

## 2024-04-22 NOTE — H&P
[facility]  H&P  Name: Fredrick Zamudio 53 y.o. male I MRN: [id number]  Unit/Bed#: ED 02 I Date of Admission: 4/21/2024   Date of Service: 4/21/2024 I Hospital Day: 0      Assessment/Plan   * Hydronephrosis with urterocalculus  Assessment & Plan  Patient with flank pain and blood in urine  IVF  Pain control  Flomax  Urology consult  Plan for stent in AM  CT: Mild right hydronephrosis secondary to a punctate calculi in the proximal ureter.     Acute pyelonephritis  Assessment & Plan  UA trace leuks, nitrite positive +blood, micro 10-20 wbc  IV rocephin  Follow up urine culture    Acute kidney injury (nontraumatic) ([facility])  Assessment & Plan  Patient with JENAE w/ mild hydronephrosis and calculi in ureter  Baseline creat 1.3 w/ CKD3a  IVF  Monitor    Morbid obesity with BMI of 40.0-44.9, adult ([facility])  Assessment & Plan  BMI 45  Healthy diet and lifestyle modification recommended       VTE Pharmacologic Prophylaxis: VTE Score: 4 Moderate Risk (Score 3-4) - Pharmacological DVT Prophylaxis Ordered: enoxaparin (Lovenox).  Code Status: Level 1 - Full Code   Discussion with patient    Anticipated Length of Stay: Patient will be admitted on an inpatient basis with an anticipated length of stay of greater than 2 midnights secondary to IVF, lab monitoring, specialist input.    Total Time Spent on Date of Encounter in care of patient: 75 mins. This time was spent on one or more of the following: performing physical exam; counseling and coordination of care; obtaining or reviewing history; documenting in the medical record; reviewing/ordering tests, medications or procedures; communicating with other healthcare professionals and discussing with patient's family/caregivers.    Chief Complaint: right flank pain    History of Present Illness:  Fredrick Zamudio is a 53 y.o. male with a PMH of ileus, ulcerative colitis with ostomy, morbid obesity, CKD3a  who presents with right flank pain. Patient reports he woke  up feeling fine, started doing work outside, in afternoon he developed pain in his right back. Has history of prior stones on left. Pain was severe and came to ED around 3 pm. He started vomiting in the afternoon. He noted blood in his urine today around 12pm.     Review of Systems:  Review of Systems   Constitutional:  Positive for chills. Negative for fever.   HENT:  Negative for rhinorrhea, sore throat and trouble swallowing.    Eyes:  Negative for pain and discharge.   Respiratory:  Negative for cough and shortness of breath.    Cardiovascular:  Negative for chest pain and palpitations.   Gastrointestinal:  Positive for nausea and vomiting. Negative for abdominal pain and diarrhea.   Genitourinary:  Positive for flank pain and hematuria.   Musculoskeletal:  Positive for back pain. Negative for myalgias and neck pain.   Skin:  Negative for rash and wound.   Neurological:  Negative for dizziness, weakness and headaches.   Psychiatric/Behavioral:  Negative for agitation and confusion.        Past Medical and Surgical History:   Past Medical History:   Diagnosis Date    Ileus ([facility]) 04/01/2022    Ulcerative colitis ([facility])        Past Surgical History:   Procedure Laterality Date    COLOSTOMY  2005    ILEOSTOMY Right 2007       Meds/Allergies:  Prior to Admission medications    Medication Sig Start Date End Date Taking? Authorizing Provider   ergocalciferol (Drisdol) 1.25 MG (45509 UT) capsule Take 1 capsule (50,000 Units total) by mouth once a week 12/29/23  Yes ANITA Lim   pravastatin (PRAVACHOL) 10 mg tablet Take 20 mg by mouth daily 6/19/23  Yes Historical Provider, MD     I have reviewed home medications with patient personally.    Allergies: No Known Allergies    Social History:  Marital Status: /Civil Union   Occupation:  for [employer]  Patient Pre-hospital Living Situation: Home  Patient Pre-hospital Level of Mobility: walks  Patient Pre-hospital Diet Restrictions:  none  Substance Use History:   Social History     Substance and Sexual Activity   Alcohol Use Not Currently    Comment: social     Social History     Tobacco Use   Smoking Status Never   Smokeless Tobacco Never     Social History     Substance and Sexual Activity   Drug Use Never       Family History:  Family History   Problem Relation Age of Onset    Alcohol abuse Mother     Alzheimer's disease Father     Cancer Sister        Physical Exam:     Vitals:   Blood Pressure: 155/83 (04/21/24 2200)  Pulse: 83 (04/21/24 2200)  Temperature: (!) 97.1 °F (36.2 °C) (04/21/24 1729)  Respirations: 17 (04/21/24 2200)  Weight - Scale: (!) 145 kg (320 lb) (04/21/24 1729)  SpO2: 96 % (04/21/24 2200)    Physical Exam  Vitals reviewed.   Constitutional:       General: He is not in acute distress.     Appearance: Normal appearance. He is morbidly obese.   HENT:      Head: Normocephalic and atraumatic.      Right Ear: External ear normal.      Left Ear: External ear normal.      Nose: Nose normal.   Eyes:      General:         Right eye: No discharge.         Left eye: No discharge.      Conjunctiva/sclera: Conjunctivae normal.   Cardiovascular:      Rate and Rhythm: Normal rate and regular rhythm.      Heart sounds: Normal heart sounds. No murmur heard.  Pulmonary:      Effort: Pulmonary effort is normal. No respiratory distress.      Breath sounds: Normal breath sounds. No wheezing or rales.   Abdominal:      General: Bowel sounds are normal. There is no distension.      Palpations: Abdomen is soft.      Tenderness: There is no abdominal tenderness. There is no guarding.      Comments: RLQ with colostomy   Musculoskeletal:         General: Normal range of motion.      Cervical back: Normal range of motion.   Skin:     General: Skin is warm and dry.   Neurological:      Mental Status: He is alert and oriented to person, place, and time. Mental status is at baseline.   Psychiatric:         Mood and Affect: Mood normal.          Behavior: Behavior normal.         Thought Content: Thought content normal.         Judgment: Judgment normal.            Additional Data:     Lab Results:  Results from last 7 days   Lab Units 04/21/24  1740   WBC Thousand/uL 14.77*   HEMOGLOBIN g/dL 15.7   HEMATOCRIT % 47.2   PLATELETS Thousands/uL 250   LYMPHO PCT % 7*   MONO PCT % 6   EOS PCT % 0     Results from last 7 days   Lab Units 04/21/24  2035 04/21/24  1824   SODIUM mmol/L 137 137   POTASSIUM mmol/L 4.8 4.4   CHLORIDE mmol/L 106 106   CO2 mmol/L 23 23   BUN mg/dL 24 23   CREATININE mg/dL 2.10* 1.91*   ANION GAP mmol/L 8 8   CALCIUM mg/dL 9.1 9.3   ALBUMIN g/dL  --  4.3   TOTAL BILIRUBIN mg/dL  --  0.49   ALK PHOS U/L  --  63   ALT U/L  --  12   AST U/L  --  17   GLUCOSE RANDOM mg/dL 113 109     Results from last 7 days   Lab Units 04/21/24  1740   INR  0.98       Lines/Drains:  Invasive Devices       Peripheral Intravenous Line  Duration             Peripheral IV 04/21/24 Right Antecubital <1 day              Drain  Duration             Colostomy RLQ -- days                  Imaging: Reviewed radiology reports from this admission including: abdominal/pelvic CT  CT abdomen pelvis wo contrast   Final Result by Maximo Zapata MD (04/21 1925)      Mild right hydronephrosis secondary to a punctate calculi in the proximal ureter.      The study was marked in EPIC for immediate notification.      Workstation performed: [id number]         XR abdomen 1 view kub    (Results Pending)       EKG and Other Studies Reviewed on Admission:   EKG: No EKG obtained.    ** Please Note: This note has been constructed using a voice recognition system. **

## 2024-04-22 NOTE — ED NOTES
Patient given urine stainer and instructed to strain when urinating.      Daly Covington RN  04/21/24 9668

## 2024-04-22 NOTE — ASSESSMENT & PLAN NOTE
Patient with JENAE w/ mild hydronephrosis and calculi in ureter  Baseline creat 1.3 w/ CKD3a  Patient had ureteral stent placed on 4/22/2024  Continue IV fluids as needed  Avoid nephrotoxic meds  Repeat BMP in the morning

## 2024-04-22 NOTE — UTILIZATION REVIEW
Initial Clinical Review    Admission: Date/Time/Statement:   Admission Orders (From admission, onward)       Ordered        04/21/24 2114  INPATIENT ADMISSION  Once                          Orders Placed This Encounter   Procedures    INPATIENT ADMISSION     Standing Status:   Standing     Number of Occurrences:   1     Order Specific Question:   Level of Care     Answer:   Med Surg [16]     Order Specific Question:   Estimated length of stay     Answer:   More than 2 Midnights     Order Specific Question:   Certification     Answer:   I certify that inpatient services are medically necessary for this patient for a duration of greater than two midnights. See H&P and MD Progress Notes for additional information about the patient's course of treatment.     ED Arrival Information       Expected   -    Arrival   4/21/2024 17:22    Acuity   Urgent              Means of arrival   Walk-In    Escorted by   Spouse    Service   Hospitalist    Admission type   Emergency              Arrival complaint   flank pain             Chief Complaint   Patient presents with    Flank Pain     Started about noon today , blood in urine today        Initial Presentation: 53 y.o. male with PMHx: ileus, ulcerative colitis with ostomy, morbid obesity, CKD3a who presented to [facility] ED due to right flank pain and vomiting for one day, blood in urine, with hx of prior stones on left.  On exam, hypertensive at 192/103, RLQ colostomy present.  Labs revealed WBC 14.77, Cr 1.91. UA brown in color and positive for ketones, blood protein, nitrites, bilirubin, leuks, wbc, rbc and bacteria.  CT AP revealed right hydronephrosis secondary to calculi.  Given 1L IVF bolus following by infusion, IV pain meds x3 and started on IV cefepime in ED. Plan:  Admit Inpatient status to med surg dt Hydronephrosis with urterocalculus: keep NPO, continue IVF, pain control, Flomax, Urology consult, strain all urine, bladder scan, continue IV rocephin, fu on  urine cxs, monitor labs including Cr.     Date: 4/22   Day 2: Ureteral stent placed today. Tolerated procedure. Continue pain control prn, repeat BMP in AM, monitor renal function, fu on urine cx, continue IVF.    4/22 Per Urology: Obstructing proximal right elongated ureteral calculus with right renal colic and worsening JENAE.  Possible UTI.  Atrophic left kidney with renal pelvic calculus without obstruction.  Plan:  Pt will undergo cystoscopy with right retrograde urogram and insertion of right ureteral stent.  He understands that the stone will not be removed at this time and will require additional procedures.  He also is on cefepime pending culture results for possible UTI.  He also will require outpatient evaluation of the left kidney to rule out nonfunctioning kidney.  Further management depends upon clinical course.    4/22 OP Note:  Procedure(s):  Right - CYSTOSCOPY RETROGRADE PYELOGRAM WITH INSERTION STENT URETERAL  Anesthesia Type: General  Operative Findings: Moderate right hydronephrosis     ED Triage Vitals   Temperature Pulse Respirations Blood Pressure SpO2   04/21/24 1729 04/21/24 1729 04/21/24 1729 04/21/24 1729 04/21/24 1729   (!) 97.1 °F (36.2 °C) 64 18 (!) 192/103 96 %      Temp Source Heart Rate Source Patient Position - Orthostatic VS BP Location FiO2 (%)   04/22/24 0810 04/21/24 1745 04/21/24 1954 04/21/24 1954 --   Temporal Monitor Sitting Left arm       Pain Score       04/21/24 1729       10 - Worst Possible Pain          Wt Readings from Last 1 Encounters:   04/22/24 (!) 145 kg (320 lb)     Additional Vital Signs:   Date/Time Temp Pulse Resp BP MAP (mmHg) SpO2 O2 Device Patient Position - Orthostatic VS   04/22/24 0810 98.1 °F (36.7 °C) -- 16 177/82 Abnormal  -- 97 % Nasal cannula --   04/21/24 2347 98 °F (36.7 °C) 63 16 160/79 -- 98 % None (Room air) --   04/21/24 2315 -- 64 17 159/73 105 95 % None (Room air) --   04/21/24 2200 -- 83 17 155/83 -- 96 % None (Room air) Sitting   04/21/24  2100 -- 69 18 162/72 104 94 % None (Room air) --   04/21/24 2030 -- 65 17 169/78 112 94 % -- --   04/21/24 1954 -- 62 17 174/83 Abnormal  -- 95 % None (Room air) Sitting   04/21/24 1745 -- 65 17 163/78 112 97 % None (Room air) --   04/21/24 1738 -- -- -- -- -- -- None (Room air) --   04/21/24 1729 97.1 °F (36.2 °C) Abnormal  64 18 192/103 Abnormal  -- 96 % None (Room air) --     Pertinent Labs/Diagnostic Test Results:   CT abdomen pelvis wo contrast   Final Result by Maximo Zapata MD (04/21 1925)      Mild right hydronephrosis secondary to a punctate calculi in the proximal ureter.      The study was marked in EPIC for immediate notification.      Workstation performed: [id number]         XR abdomen 1 view kub    (Results Pending)   FL retrograde pyelogram    (Results Pending)         Results from last 7 days   Lab Units 04/22/24 0402 04/21/24  1740   WBC Thousand/uL 11.48* 14.77*   HEMOGLOBIN g/dL 13.8 15.7   HEMATOCRIT % 42.1 47.2   PLATELETS Thousands/uL 215 250         Results from last 7 days   Lab Units 04/22/24 0402 04/21/24 2035 04/21/24  1824   SODIUM mmol/L 136 137 137   POTASSIUM mmol/L 4.3 4.8 4.4   CHLORIDE mmol/L 107 106 106   CO2 mmol/L 21 23 23   ANION GAP mmol/L 8 8 8   BUN mg/dL 28* 24 23   CREATININE mg/dL 3.06* 2.10* 1.91*   EGFR ml/min/1.73sq m 21 34 38   CALCIUM mg/dL 8.6 9.1 9.3     Results from last 7 days   Lab Units 04/21/24  1824   AST U/L 17   ALT U/L 12   ALK PHOS U/L 63   TOTAL PROTEIN g/dL 7.3   ALBUMIN g/dL 4.3   TOTAL BILIRUBIN mg/dL 0.49         Results from last 7 days   Lab Units 04/22/24 0402 04/21/24 2035 04/21/24  1824   GLUCOSE RANDOM mg/dL 116 113 109     Results from last 7 days   Lab Units 04/21/24  1740   PROTIME seconds 13.2   INR  0.98   PTT seconds 31         Results from last 7 days   Lab Units 04/22/24  0402 04/21/24  2035   PROCALCITONIN ng/ml 0.13 0.10     Results from last 7 days   Lab Units 04/21/24  1741   CLARITY UA  Turbid*   COLOR UA  Brown*    SPEC GRAV UA  1.020   PH UA  6.5   GLUCOSE UA mg/dl Negative   KETONES UA mg/dl Trace*   BLOOD UA  3+*   PROTEIN UA mg/dl 3+*   NITRITE UA  Positive*   BILIRUBIN UA  2+*   UROBILINOGEN UA E.U./dl 1.0   LEUKOCYTES UA  Trace*   WBC UA /hpf 10-20*   RBC UA /hpf Innumerable*   BACTERIA UA /hpf Moderate*   EPITHELIAL CELLS WET PREP /hpf None Seen     ED Treatment:   Medication Administration from 04/21/2024 1722 to 04/22/2024 0806         Date/Time Order Dose Route Action     04/21/2024 1745 EDT sodium chloride 0.9 % bolus 1,000 mL 1,000 mL Intravenous New Bag     04/21/2024 1757 EDT HYDROmorphone (DILAUDID) injection 1 mg 1 mg Intravenous Given     04/21/2024 1827 EDT cefepime (MAXIPIME) IVPB (premix in dextrose) 2,000 mg 50 mL 2,000 mg Intravenous New Bag     04/21/2024 1954 EDT HYDROmorphone (DILAUDID) injection 1 mg 1 mg Intravenous Given     04/22/2024 0357 EDT sodium chloride 0.9 % infusion 150 mL/hr Intravenous New Bag     04/21/2024 2143 EDT sodium chloride 0.9 % infusion 150 mL/hr Intravenous New Bag     04/22/2024 0311 EDT HYDROmorphone (DILAUDID) injection 0.5 mg 0.5 mg Intravenous Given     04/22/2024 0738 EDT cefepime (MAXIPIME) IVPB (premix in dextrose) 2,000 mg 50 mL 2,000 mg Intravenous New Bag          Past Medical History:   Diagnosis Date    Ileus ([facility]) 04/01/2022    Ulcerative colitis ([facility])      Present on Admission:   Hydronephrosis with urterocalculus   Acute pyelonephritis   Acute kidney injury (nontraumatic) ([facility])      Admitting Diagnosis: Ureterolithiasis [N20.1]  Pyelonephritis [N12]  Flank pain [R10.9]  JENAE (acute kidney injury) ([facility]) [N17.9]  Hydronephrosis with urinary obstruction due to ureteral calculus [N13.2]  Hydronephrosis, unspecified hydronephrosis type [N13.30]  Age/Sex: 53 y.o. male  Admission Orders:  Scheduled Medications:  cefepime, 2,000 mg, Intravenous, Q12H  tamsulosin, 0.4 mg, Oral, Daily With Dinner      Continuous IV Infusions:  sodium chloride, 150 mL/hr, Intravenous,  Continuous      PRN Meds:  acetaminophen, 650 mg, Oral, Q4H PRN  HYDROmorphone, 0.5 mg, Intravenous, Q4H PRN x1 thus far  HYDROmorphone, 1 mg, Intravenous, Q4H PRN  ondansetron, 4 mg, Intravenous, Q6H PRN        IP CONSULT TO UROLOGY    Network Utilization Review Department  ATTENTION: Please call with any questions or concerns to [phone] and carefully listen to the prompts so that you are directed to the right person. All voicemails are confidential.   For Discharge needs, contact Care Management DC Support Team at [phone] opt. [phone]  Send all requests for admission clinical reviews, approved or denied determinations and any other requests to dedicated fax number below belonging to the campus where the patient is receiving treatment. List of dedicated fax numbers for the Facilities:  FACILITY NAME UR FAX NUMBER   ADMISSION DENIALS (Administrative/Medical Necessity) [phone]   DISCHARGE SUPPORT TEAM (NETWORK) [phone]   PARENT CHILD HEALTH (Maternity/NICU/Pediatrics) [phone]   [facility] [phone]   [facility] [phone]   [facility] [phone]   [facility] [phone]   [facility] [phone]   [facility] [phone]   [facility] [phone]   [facility] [phone]   [facility] [phone]   [facility] [phone]   General acute hospital [phone]   [facility] [phone]

## 2024-04-23 ENCOUNTER — APPOINTMENT (INPATIENT)
Dept: RADIOLOGY | Facility: HOSPITAL | Age: 54
DRG: 660 | End: 2024-04-23
Payer: COMMERCIAL

## 2024-04-23 LAB
ANION GAP SERPL CALCULATED.3IONS-SCNC: 6 MMOL/L (ref 4–13)
BASOPHILS # BLD AUTO: 0.02 THOUSANDS/ÂΜL (ref 0–0.1)
BASOPHILS NFR BLD AUTO: 0 % (ref 0–1)
BUN SERPL-MCNC: 30 MG/DL (ref 5–25)
CALCIUM SERPL-MCNC: 8.9 MG/DL (ref 8.4–10.2)
CHLORIDE SERPL-SCNC: 113 MMOL/L (ref 96–108)
CO2 SERPL-SCNC: 20 MMOL/L (ref 21–32)
CREAT SERPL-MCNC: 2.14 MG/DL (ref 0.6–1.3)
EOSINOPHIL # BLD AUTO: 0.02 THOUSAND/ÂΜL (ref 0–0.61)
EOSINOPHIL NFR BLD AUTO: 0 % (ref 0–6)
ERYTHROCYTE [DISTWIDTH] IN BLOOD BY AUTOMATED COUNT: 13.4 % (ref 11.6–15.1)
GFR SERPL CREATININE-BSD FRML MDRD: 33 ML/MIN/1.73SQ M
GLUCOSE SERPL-MCNC: 103 MG/DL (ref 65–140)
HCT VFR BLD AUTO: 42.4 % (ref 36.5–49.3)
HGB BLD-MCNC: 13.7 G/DL (ref 12–17)
IMM GRANULOCYTES # BLD AUTO: 0.04 THOUSAND/UL (ref 0–0.2)
IMM GRANULOCYTES NFR BLD AUTO: 1 % (ref 0–2)
LYMPHOCYTES # BLD AUTO: 0.96 THOUSANDS/ÂΜL (ref 0.6–4.47)
LYMPHOCYTES NFR BLD AUTO: 11 % (ref 14–44)
MCH RBC QN AUTO: 29.2 PG (ref 26.8–34.3)
MCHC RBC AUTO-ENTMCNC: 32.3 G/DL (ref 31.4–37.4)
MCV RBC AUTO: 90 FL (ref 82–98)
MONOCYTES # BLD AUTO: 0.53 THOUSAND/ÂΜL (ref 0.17–1.22)
MONOCYTES NFR BLD AUTO: 6 % (ref 4–12)
NEUTROPHILS # BLD AUTO: 6.92 THOUSANDS/ÂΜL (ref 1.85–7.62)
NEUTS SEG NFR BLD AUTO: 82 % (ref 43–75)
NRBC BLD AUTO-RTO: 0 /100 WBCS
PLATELET # BLD AUTO: 228 THOUSANDS/UL (ref 149–390)
PMV BLD AUTO: 9.8 FL (ref 8.9–12.7)
POTASSIUM SERPL-SCNC: 4.9 MMOL/L (ref 3.5–5.3)
RBC # BLD AUTO: 4.69 MILLION/UL (ref 3.88–5.62)
SODIUM SERPL-SCNC: 139 MMOL/L (ref 135–147)
WBC # BLD AUTO: 8.49 THOUSAND/UL (ref 4.31–10.16)

## 2024-04-23 PROCEDURE — 80048 BASIC METABOLIC PNL TOTAL CA: CPT | Performed by: INTERNAL MEDICINE

## 2024-04-23 PROCEDURE — 99232 SBSQ HOSP IP/OBS MODERATE 35: CPT | Performed by: INTERNAL MEDICINE

## 2024-04-23 PROCEDURE — 85025 COMPLETE CBC W/AUTO DIFF WBC: CPT | Performed by: INTERNAL MEDICINE

## 2024-04-23 PROCEDURE — 74018 RADEX ABDOMEN 1 VIEW: CPT

## 2024-04-23 RX ORDER — SODIUM CHLORIDE 9 MG/ML
75 INJECTION, SOLUTION INTRAVENOUS ONCE
Status: COMPLETED | OUTPATIENT
Start: 2024-04-23 | End: 2024-04-23

## 2024-04-23 RX ADMIN — TAMSULOSIN HYDROCHLORIDE 0.4 MG: 0.4 CAPSULE ORAL at 17:30

## 2024-04-23 RX ADMIN — SODIUM CHLORIDE 150 ML/HR: 0.9 INJECTION, SOLUTION INTRAVENOUS at 02:35

## 2024-04-23 RX ADMIN — CEFEPIME HYDROCHLORIDE 2000 MG: 2 INJECTION, SOLUTION INTRAVENOUS at 08:30

## 2024-04-23 RX ADMIN — CEFEPIME HYDROCHLORIDE 2000 MG: 2 INJECTION, SOLUTION INTRAVENOUS at 20:28

## 2024-04-23 RX ADMIN — SODIUM CHLORIDE 75 ML/HR: 0.9 INJECTION, SOLUTION INTRAVENOUS at 17:20

## 2024-04-23 RX ADMIN — HEPARIN SODIUM 7500 UNITS: 5000 INJECTION, SOLUTION INTRAVENOUS; SUBCUTANEOUS at 05:36

## 2024-04-23 RX ADMIN — HEPARIN SODIUM 7500 UNITS: 5000 INJECTION, SOLUTION INTRAVENOUS; SUBCUTANEOUS at 21:56

## 2024-04-23 NOTE — PROGRESS NOTES
Critical access hospital  Progress Note  Name: Fredrick Zamudio I  MRN: [id number]  Unit/Bed#: -01 I Date of Admission: 4/21/2024   Date of Service: 4/23/2024 I Hospital Day: 2    Assessment/Plan   * Hydronephrosis with urterocalculus  Assessment & Plan  Patient with flank pain and blood in urine  CT: Mild right hydronephrosis secondary to a punctate calculi in the proximal ureter  Urology was consulted  Patient was taken for ureteral stent placement on 4/22/2024  Continue IV fluids as needed  Continue antibiotics, follow-up cultures  Pain control as needed  Repeat BMP in the morning to monitor kidney function    Acute pyelonephritis  Assessment & Plan  UA trace leuks, nitrite positive +blood, micro 10-20 wbc  Continue cefepime  Status post ureteral stent placement on 4/22/2024  Follow up urine culture    Acute kidney injury (nontraumatic) ([facility])  Assessment & Plan  Patient with JENAE w/ mild hydronephrosis and calculi in ureter  Baseline creat 1.3 w/ CKD3a  Patient had ureteral stent placed on 4/22/2024  Continue IV fluids as needed  Avoid nephrotoxic meds  Creatinine gradually improving.  Repeat BMP in the morning    Morbid obesity with BMI of 40.0-44.9, adult ([facility])  Assessment & Plan  BMI 45  Healthy diet and lifestyle modification recommended           VTE Pharmacologic Prophylaxis: VTE Score: 4 Moderate Risk (Score 3-4) - Pharmacological DVT Prophylaxis Ordered: heparin.    Mobility:   Basic Mobility Inpatient Raw Score: 24  JH-HLM Goal: 8: Walk 250 feet or more  JH-HLM Achieved: 8: Walk 250 feet ot more  JH-HLM Goal achieved. Continue to encourage appropriate mobility.    Patient Centered Rounds: I performed bedside rounds with nursing staff today.   Discussions with Specialists or Other Care Team Provider: yes    Education and Discussions with Family / Patient:  Updated patient regarding plan of care.     Current Length of Stay: 2 day(s)  Current Patient Status: Inpatient   Certification  Statement: The patient will continue to require additional inpatient hospital stay due to renal failure, kidney stone  Discharge Plan: Anticipate discharge tomorrow to home.    Code Status: Level 1 - Full Code    Subjective:   Patient gradually improving.  Tolerating diet.  No fevers today.    Objective:     Vitals:   Temp (24hrs), Av.2 °F (36.8 °C), Min:98.1 °F (36.7 °C), Max:98.3 °F (36.8 °C)    Temp:  [98.1 °F (36.7 °C)-98.3 °F (36.8 °C)] 98.1 °F (36.7 °C)  HR:  [73-74] 74  Resp:  [17-20] 20  BP: (110-147)/(71-76) 110/71  SpO2:  [93 %-96 %] 95 %  Body mass index is 45.66 kg/m².     Input and Output Summary (last 24 hours):     Intake/Output Summary (Last 24 hours) at 2024 1635  Last data filed at 2024 1300  Gross per 24 hour   Intake 2522.5 ml   Output 1950 ml   Net 572.5 ml       Physical Exam:   Physical Exam  Constitutional:       General: He is not in acute distress.     Appearance: He is obese.   HENT:      Head: Normocephalic and atraumatic.      Nose: Nose normal.      Mouth/Throat:      Mouth: Mucous membranes are moist.   Eyes:      Extraocular Movements: Extraocular movements intact.      Conjunctiva/sclera: Conjunctivae normal.   Cardiovascular:      Rate and Rhythm: Normal rate and regular rhythm.   Pulmonary:      Effort: Pulmonary effort is normal. No respiratory distress.   Abdominal:      Palpations: Abdomen is soft.      Tenderness: There is no abdominal tenderness. There is no right CVA tenderness or left CVA tenderness.   Musculoskeletal:         General: Normal range of motion.      Cervical back: Normal range of motion and neck supple.   Skin:     General: Skin is warm and dry.   Neurological:      General: No focal deficit present.      Mental Status: He is alert. Mental status is at baseline.      Cranial Nerves: No cranial nerve deficit.   Psychiatric:         Mood and Affect: Mood normal.         Behavior: Behavior normal.          Additional Data:     Labs:  Results from  last 7 days   Lab Units 04/23/24  0537   WBC Thousand/uL 8.49   HEMOGLOBIN g/dL 13.7   HEMATOCRIT % 42.4   PLATELETS Thousands/uL 228   SEGS PCT % 82*   LYMPHO PCT % 11*   MONO PCT % 6   EOS PCT % 0     Results from last 7 days   Lab Units 04/23/24  0537 04/21/24 2035 04/21/24  1824   SODIUM mmol/L 139   < > 137   POTASSIUM mmol/L 4.9   < > 4.4   CHLORIDE mmol/L 113*   < > 106   CO2 mmol/L 20*   < > 23   BUN mg/dL 30*   < > 23   CREATININE mg/dL 2.14*   < > 1.91*   ANION GAP mmol/L 6   < > 8   CALCIUM mg/dL 8.9   < > 9.3   ALBUMIN g/dL  --   --  4.3   TOTAL BILIRUBIN mg/dL  --   --  0.49   ALK PHOS U/L  --   --  63   ALT U/L  --   --  12   AST U/L  --   --  17   GLUCOSE RANDOM mg/dL 103   < > 109    < > = values in this interval not displayed.     Results from last 7 days   Lab Units 04/21/24  1740   INR  0.98             Results from last 7 days   Lab Units 04/22/24  0402 04/21/24 2035   PROCALCITONIN ng/ml 0.13 0.10       Lines/Drains:  Invasive Devices       Peripheral Intravenous Line  Duration             Peripheral IV 04/21/24 Right Antecubital 1 day              Drain  Duration             Colostomy RLQ -- days                    Imaging: No pertinent imaging reviewed.    Recent Cultures (last 7 days):   Results from last 7 days   Lab Units 04/22/24  0918 04/22/24  0913   URINE CULTURE  No Growth <100 cfu/mL No Growth <100 cfu/mL       Last 24 Hours Medication List:   Current Facility-Administered Medications   Medication Dose Route Frequency Provider Last Rate    acetaminophen  650 mg Oral Q4H PRN Fiona Joshua PA-C      cefepime  2,000 mg Intravenous Q12H Fiona Joshua PA-C 2,000 mg (04/23/24 0830)    heparin (porcine)  7,500 Units Subcutaneous Q8H ALLEY Jean MD      HYDROmorphone  0.5 mg Intravenous Q4H PRN Fioan Joshua PA-C      HYDROmorphone  1 mg Intravenous Q4H PRN Fiona Joshua PA-C      ondansetron  4 mg Intravenous Q6H PRN Fiona  Savi Joshua PA-C      sodium chloride  75 mL/hr Intravenous Once Rebeka Jean MD      tamsulosin  0.4 mg Oral Daily With Dinner Fiona Savi Joshua PA-C          Today, Patient Was Seen By: Rebeka Jean MD    **Please Note: This note may have been constructed using a voice recognition system.**

## 2024-04-23 NOTE — NUTRITION
"   04/23/24 1354   Biochemical Data,Medical Tests, and Procedures   Biochemical Data/Medical Tests/Procedures Lab values reviewed;Meds reviewed   Labs (Comment) 4/23/24 Cl 113, BUN 30, creat 2.14   Meds (Comment) heparin   Nutrition-Focused Physical Exam   Nutrition-Focused Physical Exam Findings RN skin assessment reviewed;No edema documented;No skin issues documented   Medical-Related Concerns obesity, JENAE, stage III CKD, vitamin D deficiency, ulcerative colitis with ostomy   Adequacy of Intake   Nutrition Modality PO   Feeding Route   PO Independent   Current PO Intake   Current Diet Order Regular diet, thin liquids   Current Meal Intake %   Estimated calorie intake compared to estimated need Nutrient needs are met.   PES Statement   Weight (3) Overweight/obesity NC-3.3   Related to Energy intake>energy output over time   As evidenced by: BMI   Recommendations/Interventions   Adult BMI Classifications Morbid Obesity 45-49.9   Summary High BMI.  Presents with back pain.  Found to have hydro nephrosis with urterocalculus.  Cystoscopy with insertion of ureteral stent 4/22.  Past medical history significant for obesity, JENAE, stage III CKD, vitamin D deficiency, ulcerative colitis with ostomy.  Weight history reviewed.  No significant changes.  No edema.  No pressure areas.  Prescribed a regular diet, thin liquids.  Meal completion 100%.  Nutrient needs are met. Met with patient at bedside. He reports having a good appetite. Usually has 3 meals daily. Reports goal of weight loss. He reports speaking with RD at [facility] but was then lost to follow up. Reports barrier to weight loss is not knowing when he is full. Patient cooks and grocery shops. NKFA. Denies dysphagia. Discussed diet as prescribed. RD provided and discussed \"General, Healthful Mediterranean Nutrition Therapy,\" \"My Plate for Meal Planning,\" and \"Tips to Support Weight Loss\" handouts. Brochure for outpatient MNT provided. Recommended patient follow " with RD in outpatient setting. He verbalizes understanding. RD to follow.   Interventions/Recommendations Continue current diet order;OP MNT follow up   Education Assessment   Education Education initiated/ completed   Patient Nutrition Goals   Goal Avoid weight gain;Improve to healthful diet

## 2024-04-23 NOTE — PLAN OF CARE
Problem: PAIN - ADULT  Goal: Verbalizes/displays adequate comfort level or baseline comfort level  Description: Interventions:  - Encourage patient to monitor pain and request assistance  - Assess pain using appropriate pain scale  - Administer analgesics based on type and severity of pain and evaluate response  - Implement non-pharmacological measures as appropriate and evaluate response  - Consider cultural and social influences on pain and pain management  - Notify physician/advanced practitioner if interventions unsuccessful or patient reports new pain  Outcome: Progressing     Problem: INFECTION - ADULT  Goal: Absence or prevention of progression during hospitalization  Description: INTERVENTIONS:  - Assess and monitor for signs and symptoms of infection  - Monitor lab/diagnostic results  - Monitor all insertion sites, i.e. indwelling lines, tubes, and drains  - Monitor endotracheal if appropriate and nasal secretions for changes in amount and color  - [place] appropriate cooling/warming therapies per order  - Administer medications as ordered  - Instruct and encourage patient and family to use good hand hygiene technique  - Identify and instruct in appropriate isolation precautions for identified infection/condition  Outcome: Progressing     Problem: SAFETY ADULT  Goal: Patient will remain free of falls  Description: INTERVENTIONS:  - Educate patient/family on patient safety including physical limitations  - Instruct patient to call for assistance with activity   - Consult OT/PT to assist with strengthening/mobility   - Keep Call bell within reach  - Keep bed low and locked with side rails adjusted as appropriate  - Keep care items and personal belongings within reach  - Initiate and maintain comfort rounds    Outcome: Progressing     Problem: DISCHARGE PLANNING  Goal: Discharge to home or other facility with appropriate resources  Description: INTERVENTIONS:  - Identify barriers to discharge w/patient and  caregiver  - Arrange for needed discharge resources and transportation as appropriate  - Identify discharge learning needs (meds, wound care, etc.)  - Refer to Case Management Department for coordinating discharge planning if the patient needs post-hospital services based on physician/advanced practitioner order or complex needs related to functional status, cognitive ability, or social support system  Outcome: Progressing     Problem: Knowledge Deficit  Goal: Patient/family/caregiver demonstrates understanding of disease process, treatment plan, medications, and discharge instructions  Description: Complete learning assessment and assess knowledge base.  Interventions:  - Provide teaching at level of understanding  - Provide teaching via preferred learning methods  Outcome: Progressing

## 2024-04-23 NOTE — PROGRESS NOTES
"Progress Note - Fredrick Zamudio 53 y.o. male MRN: [id number]    Unit/Bed#: -01 Encounter: [id number]      Assessment:  Stable status post right ureteral stent insertion.  Creatinine improved to 2.14.  KUB shows no obvious right-sided calcifications and perhaps a very faint left-sided calcification    Plan:  Continue stent.  Options discussed with the patient and the case discussed with the hospitalist.  His renal function is improving, but still well above baseline.  The patient is willing to stay today for continued hydration and repeat laboratory studies in the morning as the safer option.  Will follow-up with definitive stone management as an outpatient.    Subjective:   Feels well.  Tolerating the stent.  Voiding without difficulty.    Objective:     Vitals: Blood pressure 147/71, pulse 74, temperature 98.3 °F (36.8 °C), resp. rate 18, height 5' 10\" (1.778 m), weight (!) 144 kg (318 lb 3.2 oz), SpO2 95%.,Body mass index is 45.66 kg/m².      Intake/Output Summary (Last 24 hours) at 4/23/2024 0840  Last data filed at 4/23/2024 0752  Gross per 24 hour   Intake 3222.5 ml   Output 4025 ml   Net -802.5 ml       Physical Exam: No abdominal or flank tenderness    Invasive Devices       Peripheral Intravenous Line  Duration             Peripheral IV 04/21/24 Right Antecubital 1 day              Drain  Duration             Colostomy RLQ -- days                    Lab, Imaging and other studies: I have personally reviewed pertinent reports.    VTE Pharmacologic Prophylaxis: Sequential compression device (Venodyne)   VTE Mechanical Prophylaxis: sequential compression device   "

## 2024-04-23 NOTE — PLAN OF CARE
Problem: PAIN - ADULT  Goal: Verbalizes/displays adequate comfort level or baseline comfort level  Description: Interventions:  - Encourage patient to monitor pain and request assistance  - Assess pain using appropriate pain scale  - Administer analgesics based on type and severity of pain and evaluate response  - Implement non-pharmacological measures as appropriate and evaluate response  - Consider cultural and social influences on pain and pain management  - Notify physician/advanced practitioner if interventions unsuccessful or patient reports new pain  Outcome: Progressing     Problem: SAFETY ADULT  Goal: Patient will remain free of falls  Description: INTERVENTIONS:  - Educate patient/family on patient safety including physical limitations  - Instruct patient to call for assistance with activity   - Consult OT/PT to assist with strengthening/mobility   - Keep Call bell within reach  - Keep bed low and locked with side rails adjusted as appropriate  - Keep care items and personal belongings within reach  - Initiate and maintain comfort rounds    Outcome: Progressing

## 2024-04-23 NOTE — ASSESSMENT & PLAN NOTE
Patient with JENAE w/ mild hydronephrosis and calculi in ureter  Baseline creat 1.3 w/ CKD3a  Patient had ureteral stent placed on 4/22/2024  Continue IV fluids as needed  Avoid nephrotoxic meds  Creatinine gradually improving.  Repeat BMP in the morning

## 2024-04-23 NOTE — CASE MANAGEMENT
Case Management Assessment & Discharge Planning Note    Patient name Fredrick Zamudio  Location /-01 MRN [id number]  : 1970 Date 2024       Current Admission Date: 2024  Current Admission Diagnosis:Hydronephrosis with urterocalculus   Patient Active Problem List    Diagnosis Date Noted    Hydronephrosis with urterocalculus 2024    Acute pyelonephritis 2024    Elevated fasting blood sugar 2023    Vitamin D deficiency 2023    Blood pressure elevated without history of HTN 2023    Stage 3a chronic kidney disease (HCC) 2022    Acute kidney injury (nontraumatic) (HCC) 2022    Intra-abdominal infection 2022    Morbid obesity with BMI of 40.0-44.9, adult (HCC) 2022      LOS (days): 2  Geometric Mean LOS (GMLOS) (days):   Days to GMLOS:     OBJECTIVE:    Risk of Unplanned Readmission Score: 7.54         Current admission status: Inpatient  Referral Reason: Other (Discharge planning)    Preferred Pharmacy:   [id number] - [address] [address]  Phone: [phone] Fax: [phone]    Primary Care Provider: Ray Soto DO    Primary Insurance: [facility] BLUE [facility]  Secondary Insurance:     ASSESSMENT:  Active Health Care Proxies    There are no active Health Care Proxies on file.       Advance Directives  Does patient have a Health Care POA?: No  Was patient offered paperwork?: Yes  Does patient currently have a Health Care decision maker?: No  Does patient have Advance Directives?: No  Was patient offered paperwork?: Yes  Primary Contact: Shawnajose Zamudio - spouse         Readmission Root Cause  30 Day Readmission: No    Patient Information  Admitted from:: Home  Mental Status: Alert  During Assessment patient was accompanied by: Not accompanied during assessment  Assessment information provided by:: Patient  Primary Caregiver: Self  Support Systems: Spouse/significant  other  County of Residence: [place]  What city do you live in?: [place]  Home entry access options. Select all that apply.: Stairs  Number of steps to enter home.: 4  Do the steps have railings?: Yes  Type of Current Residence: 2 story home  Upon entering residence, is there a bedroom on the main floor (no further steps)?: No  A bedroom is located on the following floor levels of residence (select all that apply):: 2nd Floor  Upon entering residence, is there a bathroom on the main floor (no further steps)?: No  Indicate which floors of current residence have a bathroom (select all the apply):: 2nd Floor  Number of steps to 2nd floor from main floor: One Flight  Living Arrangements: Lives w/ Spouse/significant other  Is patient a ?: No    Activities of Daily Living Prior to Admission  Functional Status: Independent  Completes ADLs independently?: Yes  Ambulates independently?: Yes  Does patient use assisted devices?: No  Does patient currently own DME?: No  Does patient have a history of Outpatient Therapy (PT/OT)?: No  Does the patient have a history of Short-Term Rehab?: No  Does patient have a history of HHC?: No  Does patient currently have HHC?: No         Patient Information Continued  Income Source: Employed  Does patient have prescription coverage?: Yes  Does patient receive dialysis treatments?: No  Does patient have a history of substance abuse?: No  Does patient have a history of Mental Health Diagnosis?: No         Means of Transportation  Means of Transport to Appts:: Drives Self      Social Determinants of Health (SDOH)      Flowsheet Row Most Recent Value   Housing Stability    In the last 12 months, was there a time when you were not able to pay the mortgage or rent on time? N   In the last 12 months, how many places have you lived? 1   In the last 12 months, was there a time when you did not have a steady place to sleep or slept in a shelter (including now)? N   Transportation Needs    In  the past 12 months, has lack of transportation kept you from medical appointments or from getting medications? no   In the past 12 months, has lack of transportation kept you from meetings, work, or from getting things needed for daily living? No   Food Insecurity    Within the past 12 months, you worried that your food would run out before you got the money to buy more. Never true   Within the past 12 months, the food you bought just didn't last and you didn't have money to get more. Never true   Utilities    In the past 12 months has the electric, gas, oil, or water company threatened to shut off services in your home? No            DISCHARGE DETAILS:    Discharge planning discussed with:: Patient  Freedom of Choice: Yes                        Requested Home Health Care         Is the patient interested in HHC at discharge?: No    DME Referral Provided  Referral made for DME?: No         Would you like to participate in our [facility] service program?  : No - Declined    Treatment Team Recommendation: Home  Discharge Destination Plan:: Home  Transport at Discharge : Family                                      Additional Comments: Pt has no identified CM discharge needs at this time. Pt will return home with family when stable. CM to follow as needed.

## 2024-04-23 NOTE — ASSESSMENT & PLAN NOTE
UA trace leuks, nitrite positive +blood, micro 10-20 wbc  Continue cefepime  Status post ureteral stent placement on 4/22/2024  Follow up urine culture

## 2024-04-24 VITALS
TEMPERATURE: 98 F | HEART RATE: 64 BPM | BODY MASS INDEX: 44.96 KG/M2 | RESPIRATION RATE: 19 BRPM | DIASTOLIC BLOOD PRESSURE: 84 MMHG | SYSTOLIC BLOOD PRESSURE: 148 MMHG | HEIGHT: 70 IN | OXYGEN SATURATION: 95 % | WEIGHT: 314.05 LBS

## 2024-04-24 LAB
ANION GAP SERPL CALCULATED.3IONS-SCNC: 7 MMOL/L (ref 4–13)
BACTERIA UR CULT: NORMAL
BACTERIA UR CULT: NORMAL
BUN SERPL-MCNC: 25 MG/DL (ref 5–25)
CALCIUM SERPL-MCNC: 8.9 MG/DL (ref 8.4–10.2)
CHLORIDE SERPL-SCNC: 111 MMOL/L (ref 96–108)
CO2 SERPL-SCNC: 22 MMOL/L (ref 21–32)
CREAT SERPL-MCNC: 1.57 MG/DL (ref 0.6–1.3)
GFR SERPL CREATININE-BSD FRML MDRD: 48 ML/MIN/1.73SQ M
GLUCOSE SERPL-MCNC: 88 MG/DL (ref 65–140)
POTASSIUM SERPL-SCNC: 4.3 MMOL/L (ref 3.5–5.3)
SODIUM SERPL-SCNC: 140 MMOL/L (ref 135–147)

## 2024-04-24 PROCEDURE — 99239 HOSP IP/OBS DSCHRG MGMT >30: CPT | Performed by: INTERNAL MEDICINE

## 2024-04-24 PROCEDURE — 80048 BASIC METABOLIC PNL TOTAL CA: CPT | Performed by: INTERNAL MEDICINE

## 2024-04-24 RX ORDER — TAMSULOSIN HYDROCHLORIDE 0.4 MG/1
0.4 CAPSULE ORAL
Qty: 30 CAPSULE | Refills: 0 | Status: SHIPPED | OUTPATIENT
Start: 2024-04-24

## 2024-04-24 RX ORDER — CEFPODOXIME PROXETIL 200 MG/1
200 TABLET, FILM COATED ORAL 2 TIMES DAILY
Qty: 10 TABLET | Refills: 0 | Status: SHIPPED | OUTPATIENT
Start: 2024-04-24 | End: 2024-04-29

## 2024-04-24 RX ADMIN — HEPARIN SODIUM 7500 UNITS: 5000 INJECTION, SOLUTION INTRAVENOUS; SUBCUTANEOUS at 05:25

## 2024-04-24 RX ADMIN — CEFEPIME HYDROCHLORIDE 2000 MG: 2 INJECTION, SOLUTION INTRAVENOUS at 07:55

## 2024-04-24 NOTE — DISCHARGE SUMMARY
[facility]  Discharge- Fredrick Zamudio 1970, 53 y.o. male MRN: [id number]  Unit/Bed#: -01 Encounter: [id number]  Primary Care Provider: Ray Soto DO   Date and time admitted to hospital: 4/21/2024  5:23 PM    * Hydronephrosis with urterocalculus  Assessment & Plan  Patient with flank pain and blood in urine  CT: Mild right hydronephrosis secondary to a punctate calculi in the proximal ureter  Urology was consulted  Patient was taken for ureteral stent placement on 4/22/2024  Clinically improved  Patient will be discharged on oral antibiotics and tamsulosin  Outpatient follow-up with urology    Acute pyelonephritis  Assessment & Plan  UA trace leuks, nitrite positive +blood, micro 10-20 wbc  Urine cultures have been negative  Status post ureteral stent placement on 4/22/2024  Plan to discharge on cefpodoxime to complete course of therapy  Advised to return with any worsening symptoms    Acute kidney injury (nontraumatic) ([facility])  Assessment & Plan  Patient with JENAE w/ mild hydronephrosis and calculi in ureter  Baseline creat 1.3 w/ CKD3a  Patient had ureteral stent placed on 4/22/2024  Creatinine gradually improving  Repeat routine labs with PCP as outpatient in 1 to 2 weeks    Morbid obesity with BMI of 40.0-44.9, adult (HCC)  Assessment & Plan  BMI 45  Healthy diet and lifestyle modification recommended      Medical Problems       Resolved Problems  Date Reviewed: 4/21/2024   None       Discharging Physician / Practitioner: Rebeka Jean MD  PCP: Ray Soto DO  Admission Date:   Admission Orders (From admission, onward)       Ordered        04/21/24 2114  INPATIENT ADMISSION  Once                          Discharge Date: 04/24/24    Consultations During Hospital Stay:  Urology    Procedures Performed:   Ureteral stent placement    Significant Findings / Test Results:   Kidney stone, UTI    Incidental Findings:   None    Test Results Pending at Discharge  "(will require follow up):   none     Outpatient Tests Requested:  Routine labs with PCP in 1 to 2 weeks    Complications: None    Reason for Admission: Kidney stone    Hospital Course:   Fredrick Zamudio is a 53 y.o. male patient who originally presented to the hospital on 4/21/2024 due to flank pain.  Patient found to have kidney stone.  Started on IV antibiotics for suspected UTI/pyelonephritis.  Urology was consulted.  Also noted to have acute kidney injury secondary to obstructing renal calculi.  Patient was taken to the OR for stent placement.  Tolerated procedure well.  Creatinine has been improving appropriately.  Patient doing well today.  Plan to discharge home with oral antibiotics.  Advised to follow-up with PCP and urology as outpatient.    Please see above list of diagnoses and related plan for additional information.     Condition at Discharge: stable    Discharge Day Visit / Exam:   Subjective: No complaints at this time  Vitals: Blood Pressure: 148/84 (04/24/24 0703)  Pulse: 64 (04/24/24 0703)  Temperature: 98 °F (36.7 °C) (04/24/24 0703)  Temp Source: Oral (04/24/24 0703)  Respirations: 19 (04/24/24 0703)  Height: 5' 10\" (177.8 cm) (04/22/24 0810)  Weight - Scale: (!) 142 kg (314 lb 0.7 oz) (04/24/24 0600)  SpO2: 95 % (04/24/24 0703)  Exam:   Physical Exam  Constitutional:       General: He is not in acute distress.     Appearance: He is obese.   HENT:      Head: Normocephalic and atraumatic.      Nose: Nose normal.      Mouth/Throat:      Mouth: Mucous membranes are moist.   Eyes:      Extraocular Movements: Extraocular movements intact.      Conjunctiva/sclera: Conjunctivae normal.   Cardiovascular:      Rate and Rhythm: Normal rate and regular rhythm.   Pulmonary:      Effort: Pulmonary effort is normal. No respiratory distress.   Abdominal:      Palpations: Abdomen is soft.      Tenderness: There is no abdominal tenderness. There is no right CVA tenderness or left CVA tenderness. "   Musculoskeletal:         General: Normal range of motion.      Cervical back: Normal range of motion and neck supple.   Skin:     General: Skin is warm and dry.   Neurological:      General: No focal deficit present.      Mental Status: He is alert. Mental status is at baseline.      Cranial Nerves: No cranial nerve deficit.   Psychiatric:         Mood and Affect: Mood normal.         Behavior: Behavior normal.          Discussion with Family:  Updated patient regarding plan of care.     Discharge instructions/Information to patient and family:   See after visit summary for information provided to patient and family.      Provisions for Follow-Up Care:  See after visit summary for information related to follow-up care and any pertinent home health orders.      Mobility at time of Discharge:   Basic Mobility Inpatient Raw Score: 24  JH-HLM Goal: 8: Walk 250 feet or more  JH-HLM Achieved: 7: Walk 25 feet or more  HLM Goal achieved. Continue to encourage appropriate mobility.     Disposition:   Home    Planned Readmission: no     Discharge Statement:  I spent 35 minutes discharging the patient. This time was spent on the day of discharge. I had direct contact with the patient on the day of discharge. Greater than 50% of the total time was spent examining patient, answering all patient questions, arranging and discussing plan of care with patient as well as directly providing post-discharge instructions.  Additional time then spent on discharge activities.    Discharge Medications:  See after visit summary for reconciled discharge medications provided to patient and/or family.      **Please Note: This note may have been constructed using a voice recognition system**

## 2024-04-24 NOTE — ASSESSMENT & PLAN NOTE
Patient with flank pain and blood in urine  CT: Mild right hydronephrosis secondary to a punctate calculi in the proximal ureter  Urology was consulted  Patient was taken for ureteral stent placement on 4/22/2024  Clinically improved  Patient will be discharged on oral antibiotics and tamsulosin  Outpatient follow-up with urology

## 2024-04-24 NOTE — ASSESSMENT & PLAN NOTE
UA trace leuks, nitrite positive +blood, micro 10-20 wbc  Urine cultures have been negative  Status post ureteral stent placement on 4/22/2024  Plan to discharge on cefpodoxime to complete course of therapy  Advised to return with any worsening symptoms

## 2024-04-24 NOTE — PROGRESS NOTES
All discharge instructions revierwed with patient.  Masimo removed.  Heplock removed.  Scripts called to pharmacy.  Pt discharged to home and awaiting ride at present.

## 2024-04-24 NOTE — ASSESSMENT & PLAN NOTE
Patient with JENAE w/ mild hydronephrosis and calculi in ureter  Baseline creat 1.3 w/ CKD3a  Patient had ureteral stent placed on 4/22/2024  Creatinine gradually improving  Repeat routine labs with PCP as outpatient in 1 to 2 weeks

## 2024-04-25 NOTE — UTILIZATION REVIEW
NOTIFICATION OF ADMISSION DISCHARGE   This is a Notification of Discharge from [facility]. Please be advised that this patient has been discharge from our facility. Below you will find the admission and discharge date and time including the patient’s disposition.   UTILIZATION REVIEW CONTACT:  Claudine Lomax  Utilization   Network Utilization Review Department  Phone: [phone] x carefully listen to the prompts. All voicemails are confidential.  Email: [email or web address]     ADMISSION INFORMATION  PRESENTATION DATE: 4/21/2024  5:23 PM  OBERVATION ADMISSION DATE:   INPATIENT ADMISSION DATE: 4/21/24  9:14 PM   DISCHARGE DATE: 4/24/2024 10:50 AM   DISPOSITION:Home/Self Care    Network Utilization Review Department  ATTENTION: Please call with any questions or concerns to [phone] and carefully listen to the prompts so that you are directed to the right person. All voicemails are confidential.   For Discharge needs, contact Care Management DC Support Team at [phone] opt. 2  Send all requests for admission clinical reviews, approved or denied determinations and any other requests to dedicated fax number below belonging to the campus where the patient is receiving treatment. List of dedicated fax numbers for the Facilities:  FACILITY NAME UR FAX NUMBER   ADMISSION DENIALS (Administrative/Medical Necessity) [phone]   DISCHARGE SUPPORT TEAM ([facility]) [phone]   PARENT CHILD HEALTH (Maternity/NICU/Pediatrics) [phone]   [facility] [phone]   [facility] [phone]   [facility] [phone]   [facility] [phone]   [facility] [phone]   [facility] [phone]   [facility] [phone]   [facility] [phone]    [facility] [phone]   [facility] [phone]   [facility] [phone]   [facility] [phone]

## 2024-05-14 ENCOUNTER — HOSPITAL ENCOUNTER (OUTPATIENT)
Dept: NUCLEAR MEDICINE | Facility: HOSPITAL | Age: 54
Discharge: HOME | End: 2024-05-14
Attending: UROLOGY
Payer: COMMERCIAL

## 2024-05-14 DIAGNOSIS — N20.0 URIC ACID NEPHROLITHIASIS: ICD-10-CM

## 2024-05-14 DIAGNOSIS — N28.0 RENAL ARTERY OBSTRUCTION (HCC): ICD-10-CM

## 2024-05-14 DIAGNOSIS — N26.1 ATROPHY OF KIDNEY: ICD-10-CM

## 2024-05-14 PROCEDURE — 78708 K FLOW/FUNCT IMAGE W/DRUG: CPT

## 2024-05-14 PROCEDURE — A9562 TC99M MERTIATIDE: HCPCS

## 2024-05-14 RX ORDER — FUROSEMIDE 10 MG/ML
40 INJECTION INTRAMUSCULAR; INTRAVENOUS
Status: DISCONTINUED | OUTPATIENT
Start: 2024-05-14 | End: 2024-05-18 | Stop reason: HOSPADM

## 2024-05-18 ENCOUNTER — HOSPITAL ENCOUNTER (EMERGENCY)
Facility: HOSPITAL | Age: 54
Discharge: HOME | End: 2024-05-18
Attending: INTERNAL MEDICINE
Payer: COMMERCIAL

## 2024-05-18 ENCOUNTER — APPOINTMENT (EMERGENCY)
Dept: CT IMAGING | Facility: HOSPITAL | Age: 54
End: 2024-05-18
Payer: COMMERCIAL

## 2024-05-18 VITALS
HEART RATE: 72 BPM | OXYGEN SATURATION: 97 % | TEMPERATURE: 97.8 F | SYSTOLIC BLOOD PRESSURE: 197 MMHG | RESPIRATION RATE: 18 BRPM | DIASTOLIC BLOOD PRESSURE: 94 MMHG

## 2024-05-18 DIAGNOSIS — N20.0 KIDNEY STONE: Primary | ICD-10-CM

## 2024-05-18 DIAGNOSIS — N39.0 UTI (URINARY TRACT INFECTION): ICD-10-CM

## 2024-05-18 DIAGNOSIS — R10.9 LEFT FLANK PAIN: ICD-10-CM

## 2024-05-18 LAB
ALBUMIN SERPL BCP-MCNC: 4.6 G/DL (ref 3.5–5)
ALP SERPL-CCNC: 64 U/L (ref 34–104)
ALT SERPL W P-5'-P-CCNC: 15 U/L (ref 7–52)
ANION GAP SERPL CALCULATED.3IONS-SCNC: 10 MMOL/L (ref 4–13)
AST SERPL W P-5'-P-CCNC: 18 U/L (ref 13–39)
BACTERIA UR QL AUTO: ABNORMAL /HPF
BASOPHILS # BLD AUTO: 0.05 THOUSANDS/ÂΜL (ref 0–0.1)
BASOPHILS NFR BLD AUTO: 1 % (ref 0–1)
BILIRUB SERPL-MCNC: 0.57 MG/DL (ref 0.2–1)
BILIRUB UR QL STRIP: NEGATIVE
BUN SERPL-MCNC: 20 MG/DL (ref 5–25)
CALCIUM SERPL-MCNC: 9.6 MG/DL (ref 8.4–10.2)
CHLORIDE SERPL-SCNC: 104 MMOL/L (ref 96–108)
CLARITY UR: ABNORMAL
CO2 SERPL-SCNC: 24 MMOL/L (ref 21–32)
COLOR UR: ABNORMAL
CREAT SERPL-MCNC: 1.42 MG/DL (ref 0.6–1.3)
EOSINOPHIL # BLD AUTO: 0.08 THOUSAND/ÂΜL (ref 0–0.61)
EOSINOPHIL NFR BLD AUTO: 1 % (ref 0–6)
ERYTHROCYTE [DISTWIDTH] IN BLOOD BY AUTOMATED COUNT: 12.6 % (ref 11.6–15.1)
GFR SERPL CREATININE-BSD FRML MDRD: 55 ML/MIN/1.73SQ M
GLUCOSE SERPL-MCNC: 104 MG/DL (ref 65–140)
GLUCOSE UR STRIP-MCNC: NEGATIVE MG/DL
HCT VFR BLD AUTO: 46.4 % (ref 36.5–49.3)
HGB BLD-MCNC: 15.1 G/DL (ref 12–17)
HGB UR QL STRIP.AUTO: ABNORMAL
IMM GRANULOCYTES # BLD AUTO: 0.03 THOUSAND/UL (ref 0–0.2)
IMM GRANULOCYTES NFR BLD AUTO: 0 % (ref 0–2)
KETONES UR STRIP-MCNC: NEGATIVE MG/DL
LEUKOCYTE ESTERASE UR QL STRIP: NEGATIVE
LYMPHOCYTES # BLD AUTO: 1.25 THOUSANDS/ÂΜL (ref 0.6–4.47)
LYMPHOCYTES NFR BLD AUTO: 13 % (ref 14–44)
MCH RBC QN AUTO: 29.2 PG (ref 26.8–34.3)
MCHC RBC AUTO-ENTMCNC: 32.5 G/DL (ref 31.4–37.4)
MCV RBC AUTO: 90 FL (ref 82–98)
MONOCYTES # BLD AUTO: 0.58 THOUSAND/ÂΜL (ref 0.17–1.22)
MONOCYTES NFR BLD AUTO: 6 % (ref 4–12)
MUCOUS THREADS UR QL AUTO: ABNORMAL
NEUTROPHILS # BLD AUTO: 7.94 THOUSANDS/ÂΜL (ref 1.85–7.62)
NEUTS SEG NFR BLD AUTO: 79 % (ref 43–75)
NITRITE UR QL STRIP: NEGATIVE
NON-SQ EPI CELLS URNS QL MICRO: ABNORMAL /HPF
NRBC BLD AUTO-RTO: 0 /100 WBCS
PH UR STRIP.AUTO: 5.5 [PH]
PLATELET # BLD AUTO: 276 THOUSANDS/UL (ref 149–390)
PMV BLD AUTO: 9.7 FL (ref 8.9–12.7)
POTASSIUM SERPL-SCNC: 4.3 MMOL/L (ref 3.5–5.3)
PROT SERPL-MCNC: 7.6 G/DL (ref 6.4–8.4)
PROT UR STRIP-MCNC: ABNORMAL MG/DL
RBC # BLD AUTO: 5.18 MILLION/UL (ref 3.88–5.62)
RBC #/AREA URNS AUTO: ABNORMAL /HPF
SODIUM SERPL-SCNC: 138 MMOL/L (ref 135–147)
SP GR UR STRIP.AUTO: 1.02
UROBILINOGEN UR QL STRIP.AUTO: 0.2 E.U./DL
WBC # BLD AUTO: 9.93 THOUSAND/UL (ref 4.31–10.16)
WBC #/AREA URNS AUTO: ABNORMAL /HPF

## 2024-05-18 PROCEDURE — 99285 EMERGENCY DEPT VISIT HI MDM: CPT | Performed by: INTERNAL MEDICINE

## 2024-05-18 PROCEDURE — 96375 TX/PRO/DX INJ NEW DRUG ADDON: CPT

## 2024-05-18 PROCEDURE — 36415 COLL VENOUS BLD VENIPUNCTURE: CPT | Performed by: INTERNAL MEDICINE

## 2024-05-18 PROCEDURE — 81003 URINALYSIS AUTO W/O SCOPE: CPT | Performed by: INTERNAL MEDICINE

## 2024-05-18 PROCEDURE — 80053 COMPREHEN METABOLIC PANEL: CPT | Performed by: INTERNAL MEDICINE

## 2024-05-18 PROCEDURE — 74176 CT ABD & PELVIS W/O CONTRAST: CPT

## 2024-05-18 PROCEDURE — 81001 URINALYSIS AUTO W/SCOPE: CPT | Performed by: INTERNAL MEDICINE

## 2024-05-18 PROCEDURE — 96365 THER/PROPH/DIAG IV INF INIT: CPT

## 2024-05-18 PROCEDURE — 99284 EMERGENCY DEPT VISIT MOD MDM: CPT

## 2024-05-18 PROCEDURE — 85025 COMPLETE CBC W/AUTO DIFF WBC: CPT | Performed by: INTERNAL MEDICINE

## 2024-05-18 PROCEDURE — 96361 HYDRATE IV INFUSION ADD-ON: CPT

## 2024-05-18 PROCEDURE — 96376 TX/PRO/DX INJ SAME DRUG ADON: CPT

## 2024-05-18 RX ORDER — HYDROMORPHONE HCL/PF 1 MG/ML
0.5 SYRINGE (ML) INJECTION ONCE
Status: COMPLETED | OUTPATIENT
Start: 2024-05-18 | End: 2024-05-18

## 2024-05-18 RX ORDER — CEPHALEXIN 500 MG/1
500 CAPSULE ORAL EVERY 6 HOURS SCHEDULED
Qty: 28 CAPSULE | Refills: 0 | Status: SHIPPED | OUTPATIENT
Start: 2024-05-18 | End: 2024-05-25

## 2024-05-18 RX ORDER — CEFTRIAXONE 2 G/50ML
2000 INJECTION, SOLUTION INTRAVENOUS ONCE
Status: COMPLETED | OUTPATIENT
Start: 2024-05-18 | End: 2024-05-18

## 2024-05-18 RX ORDER — OXYCODONE HYDROCHLORIDE AND ACETAMINOPHEN 5; 325 MG/1; MG/1
1 TABLET ORAL EVERY 4 HOURS PRN
Qty: 12 TABLET | Refills: 0 | Status: SHIPPED | OUTPATIENT
Start: 2024-05-18 | End: 2024-05-28

## 2024-05-18 RX ADMIN — CEFTRIAXONE 2000 MG: 2 INJECTION, SOLUTION INTRAVENOUS at 20:03

## 2024-05-18 RX ADMIN — HYDROMORPHONE HYDROCHLORIDE 0.5 MG: 1 INJECTION, SOLUTION INTRAMUSCULAR; INTRAVENOUS; SUBCUTANEOUS at 17:27

## 2024-05-18 RX ADMIN — SODIUM CHLORIDE 1000 ML: 0.9 INJECTION, SOLUTION INTRAVENOUS at 17:23

## 2024-05-18 RX ADMIN — HYDROMORPHONE HYDROCHLORIDE 0.5 MG: 1 INJECTION, SOLUTION INTRAMUSCULAR; INTRAVENOUS; SUBCUTANEOUS at 20:02

## 2024-05-18 NOTE — ED PROVIDER NOTES
History  Chief Complaint   Patient presents with    Flank Pain     Patient comes in with left flank pain that started at 1200 today. Patient denies nausea, vomiting, chest pain, or shortness of breath. Hx of kidney stones.      53-year-old male presents with left flank pain which started around noon today.  Patient has a history of nephrolithiasis, last stone was on the right side and Dr. Mosley put a stent and this was about 2 weeks ago.  Around noon today the patient started developing left flank pain, radiating into his groin region, he states he is been passing a lot of stones and some blood in his urine.  He has had no fever chills or rigors.  He denies any nausea or vomiting.    Patient has colostomy which is functioning at this time, he is status post total colectomy secondary to ulcerative colitis.  The colectomy was performed about 15 years ago.        Prior to Admission Medications   Prescriptions Last Dose Informant Patient Reported? Taking?   ergocalciferol (Drisdol) 1.25 MG (33493 UT) capsule   No No   Sig: Take 1 capsule (50,000 Units total) by mouth once a week   pravastatin (PRAVACHOL) 10 mg tablet  Self Yes No   Sig: Take 20 mg by mouth daily   tamsulosin (FLOMAX) 0.4 mg   No No   Sig: Take 1 capsule (0.4 mg total) by mouth daily with dinner      Facility-Administered Medications: None       Past Medical History:   Diagnosis Date    Ileus (HCC) 04/01/2022    Ulcerative colitis (HCC)        Past Surgical History:   Procedure Laterality Date    COLOSTOMY  2005    FL RETROGRADE PYELOGRAM  4/22/2024    ILEOSTOMY Right 2007    GA CYSTO BLADDER W/URETERAL CATHETERIZATION Right 4/22/2024    Procedure: CYSTOSCOPY RETROGRADE PYELOGRAM WITH INSERTION STENT URETERAL;  Surgeon: Gualberto Mosley MD;  Location: [facility];  Service: Urology       Family History   Problem Relation Age of Onset    Alcohol abuse Mother     Alzheimer's disease Father     Cancer Sister      I have reviewed and agree with the history as  documented.    E-Cigarette/Vaping     E-Cigarette/Vaping Substances    Nicotine No     THC No     CBD No     Flavoring No     Other No     Unknown No      Social History     Tobacco Use    Smoking status: Never    Smokeless tobacco: Never   Substance Use Topics    Alcohol use: Not Currently     Comment: social    Drug use: Never       Review of Systems   Constitutional: Negative.  Negative for chills and fever.   Respiratory: Negative.     Cardiovascular: Negative.    Gastrointestinal: Negative.    Genitourinary:  Positive for flank pain. Negative for testicular pain.   Skin: Negative.    Neurological: Negative.    Hematological: Negative.    Psychiatric/Behavioral: Negative.         Physical Exam  Physical Exam  Vitals and nursing note reviewed.   Constitutional:       General: He is not in acute distress.     Appearance: He is obese. He is not ill-appearing.   HENT:      Head: Normocephalic and atraumatic.   Eyes:      Extraocular Movements: Extraocular movements intact.      Conjunctiva/sclera: Conjunctivae normal.   Cardiovascular:      Rate and Rhythm: Normal rate and regular rhythm.      Pulses: Normal pulses.      Heart sounds: Normal heart sounds.   Pulmonary:      Effort: Pulmonary effort is normal.      Breath sounds: Normal breath sounds.   Abdominal:      General: Abdomen is flat. Bowel sounds are normal.      Palpations: Abdomen is soft.      Tenderness: There is no abdominal tenderness. There is no right CVA tenderness or left CVA tenderness.      Hernia: No hernia is present.   Musculoskeletal:         General: Normal range of motion.      Cervical back: Normal range of motion and neck supple.   Skin:     General: Skin is warm and dry.   Neurological:      General: No focal deficit present.      Mental Status: He is alert.   Psychiatric:         Mood and Affect: Mood normal.         Behavior: Behavior normal.         Thought Content: Thought content normal.         Judgment: Judgment normal.          Vital Signs  ED Triage Vitals [05/18/24 1704]   Temperature Pulse Respirations Blood Pressure SpO2   97.8 °F (36.6 °C) 72 18 (!) 212/115 97 %      Temp src Heart Rate Source Patient Position - Orthostatic VS BP Location FiO2 (%)   -- -- -- -- --      Pain Score       9           Vitals:    05/18/24 1704 05/18/24 1709   BP: (!) 212/115 (!) 197/94   Pulse: 72          Visual Acuity      ED Medications  Medications   cefTRIAXone (ROCEPHIN) IVPB (premix in dextrose) 2,000 mg 50 mL (has no administration in time range)   HYDROmorphone (DILAUDID) injection 0.5 mg (has no administration in time range)   sodium chloride 0.9 % bolus 1,000 mL (1,000 mL Intravenous New Bag 5/18/24 1723)   HYDROmorphone (DILAUDID) injection 0.5 mg (0.5 mg Intravenous Given 5/18/24 1727)       Diagnostic Studies  Results Reviewed       Procedure Component Value Units Date/Time    Comprehensive metabolic panel [[id number]]  (Abnormal) Collected: 05/18/24 1722    Lab Status: Final result Specimen: Blood from Arm, Right Updated: 05/18/24 1748     Sodium 138 mmol/L      Potassium 4.3 mmol/L      Chloride 104 mmol/L      CO2 24 mmol/L      ANION GAP 10 mmol/L      BUN 20 mg/dL      Creatinine 1.42 mg/dL      Glucose 104 mg/dL      Calcium 9.6 mg/dL      AST 18 U/L      ALT 15 U/L      Alkaline Phosphatase 64 U/L      Total Protein 7.6 g/dL      Albumin 4.6 g/dL      Total Bilirubin 0.57 mg/dL      eGFR 55 ml/min/1.73sq m     Narrative:      National Kidney Disease Foundation guidelines for Chronic Kidney Disease (CKD):     Stage 1 with normal or high GFR (GFR > 90 mL/min/1.73 square meters)    Stage 2 Mild CKD (GFR = 60-89 mL/min/1.73 square meters)    Stage 3A Moderate CKD (GFR = 45-59 mL/min/1.73 square meters)    Stage 3B Moderate CKD (GFR = 30-44 mL/min/1.73 square meters)    Stage 4 Severe CKD (GFR = 15-29 mL/min/1.73 square meters)    Stage 5 End Stage CKD (GFR <15 mL/min/1.73 square meters)  Note: GFR calculation is accurate only with a  steady state creatinine    Urine Microscopic [[id number]]  (Abnormal) Collected: 05/18/24 1722    Lab Status: Final result Specimen: Urine, Clean Catch Updated: 05/18/24 1746     RBC, UA 30-50 /hpf      WBC, UA 0-1 /hpf      Epithelial Cells Occasional /hpf      Bacteria, UA None Seen /hpf      MUCUS THREADS Occasional    UA w Reflex to Microscopic w Reflex to Culture [[id number]]  (Abnormal) Collected: 05/18/24 1722    Lab Status: Final result Specimen: Urine, Clean Catch Updated: 05/18/24 1732     Color, UA Red     Clarity, UA Cloudy     Specific Gravity, UA 1.025     pH, UA 5.5     Leukocytes, UA Negative     Nitrite, UA Negative     Protein, UA 1+ mg/dl      Glucose, UA Negative mg/dl      Ketones, UA Negative mg/dl      Urobilinogen, UA 0.2 E.U./dl      Bilirubin, UA Negative     Occult Blood, UA 3+    CBC and differential [[id number]]  (Abnormal) Collected: 05/18/24 1722    Lab Status: Final result Specimen: Blood from Arm, Right Updated: 05/18/24 1730     WBC 9.93 Thousand/uL      RBC 5.18 Million/uL      Hemoglobin 15.1 g/dL      Hematocrit 46.4 %      MCV 90 fL      MCH 29.2 pg      MCHC 32.5 g/dL      RDW 12.6 %      MPV 9.7 fL      Platelets 276 Thousands/uL      nRBC 0 /100 WBCs      Segmented % 79 %      Immature Grans % 0 %      Lymphocytes % 13 %      Monocytes % 6 %      Eosinophils Relative 1 %      Basophils Relative 1 %      Absolute Neutrophils 7.94 Thousands/µL      Absolute Immature Grans 0.03 Thousand/uL      Absolute Lymphocytes 1.25 Thousands/µL      Absolute Monocytes 0.58 Thousand/µL      Eosinophils Absolute 0.08 Thousand/µL      Basophils Absolute 0.05 Thousands/µL                    CT renal stone study abdomen pelvis wo contrast   Final Result by Jim Chapman MD (05/18 1903)      1.  Mild left-sided hydroureteronephrosis secondary to a 2 mm left UVJ calculus.      2.  Right-sided nephroureteral stent in place with continued mild pelvicaliectasis. There is no ureteral calculus.      3.   Left greater than right renal calculi measuring up to 1.6 cm.      4.  Cholelithiasis.      The study was marked in EPIC for immediate notification.      Workstation performed: [id number]                    Procedures  Procedures         ED Course                               SBIRT 20yo+      Flowsheet Row Most Recent Value   Initial Alcohol Screen: US AUDIT-C     1. How often do you have a drink containing alcohol? 0 Filed at: 05/18/2024 1704   2. How many drinks containing alcohol do you have on a typical day you are drinking?  0 Filed at: 05/18/2024 1704   3a. Male UNDER 65: How often do you have five or more drinks on one occasion? 0 Filed at: 05/18/2024 1704   3b. FEMALE Any Age, or MALE 65+: How often do you have 4 or more drinks on one occassion? 0 Filed at: 05/18/2024 1704   Audit-C Score 0 Filed at: 05/18/2024 1704   GORDON: How many times in the past year have you...    Used an illegal drug or used a prescription medication for non-medical reasons? Never Filed at: 05/18/2024 1704                      Medical Decision Making  Patient present with left-sided flank pain, CT scan shows a 2 mm stone in the UV junction.  I discussed case with urology Dr. Vega and agreed with discharge plan antibiotic pain med and patient to follow-up with his urologist Dr. Mosley.    Amount and/or Complexity of Data Reviewed  Labs: ordered.  Radiology: ordered.    Risk  Prescription drug management.             Disposition  Final diagnoses:   Kidney stone   Left flank pain   UTI (urinary tract infection)     Time reflects when diagnosis was documented in both MDM as applicable and the Disposition within this note       Time User Action Codes Description Comment    5/18/2024  7:28 PM Andrzej Baltazar Add [N20.0] Kidney stone     5/18/2024  7:28 PM Andrzej Baltazar Add [R10.9] Left flank pain     5/18/2024  7:28 PM Andrzej Baltazar Add [N39.0] UTI (urinary tract infection)           ED Disposition       ED Disposition    Discharge    Condition   Stable    Date/Time   Sat May 18, 2024 1928    Comment   Fredrick TEQUILA Trevorjamaal discharge to home/self care.                   Follow-up Information       Follow up With Specialties Details Why Contact Info    Gualberto Mosley MD Urology In 2 days  [address] [address]  [phone]              Patient's Medications   Discharge Prescriptions    CEPHALEXIN (KEFLEX) 500 MG CAPSULE    Take 1 capsule (500 mg total) by mouth every 6 (six) hours for 7 days       Start Date: 5/18/2024 End Date: 5/25/2024       Order Dose: 500 mg       Quantity: 28 capsule    Refills: 0    OXYCODONE-ACETAMINOPHEN (PERCOCET) 5-325 MG PER TABLET    Take 1 tablet by mouth every 4 (four) hours as needed for moderate pain for up to 10 days Max Daily Amount: 6 tablets       Start Date: 5/18/2024 End Date: 5/28/2024       Order Dose: 1 tablet       Quantity: 12 tablet    Refills: 0       No discharge procedures on file.    PDMP Review       None            ED Provider  Electronically Signed by             Andrzej Baltazar MD  05/18/24 1931

## 2024-05-21 PROBLEM — N10 ACUTE PYELONEPHRITIS: Status: RESOLVED | Noted: 2024-04-21 | Resolved: 2024-05-21

## 2024-05-22 PROCEDURE — 82360 CALCULUS ASSAY QUANT: CPT | Performed by: UROLOGY

## 2024-05-23 ENCOUNTER — LAB REQUISITION (OUTPATIENT)
Dept: LAB | Facility: HOSPITAL | Age: 54
End: 2024-05-23
Payer: COMMERCIAL

## 2024-05-23 DIAGNOSIS — N20.0 CALCULUS OF KIDNEY: ICD-10-CM

## 2024-06-03 LAB
COLOR STONE: NORMAL
COM MFR STONE: 10 %
COMMENT-STONE3: NORMAL
COMPOSITION: NORMAL
LABORATORY COMMENT REPORT: NORMAL
PHOTO: NORMAL
SIZE STONE: NORMAL MM
SPEC SOURCE SUBJ: NORMAL
STONE ANALYSIS-IMP: NORMAL
URATE MFR STONE: 90 %
WT STONE: 12 MG

## 2024-06-07 ENCOUNTER — ANESTHESIA EVENT (OUTPATIENT)
Dept: PERIOP | Facility: HOSPITAL | Age: 54
End: 2024-06-07
Payer: COMMERCIAL

## 2024-06-07 NOTE — PRE-PROCEDURE INSTRUCTIONS
Pre-Surgery Instructions:   Medication Instructions    ergocalciferol (Drisdol) 1.25 MG (50612 UT) capsule Continue normal schedule    Medication instructions for day surgery reviewed. Please use only a sip of water to take your instructed medications. Avoid all over the counter vitamins, supplements and NSAIDS for one week prior to surgery per anesthesia guidelines. Tylenol is ok to take as needed.     You will receive a call one business day prior to surgery with an arrival time and hospital directions. If your surgery is scheduled on a Monday, the hospital will be calling you on the Friday prior to your surgery. If you have not heard from anyone by 8pm, please call the hospital supervisor through the hospital  at [phone]. ([place] [phone] or [place] [phone]).    Do not eat or drink anything after midnight the night before your surgery, including candy, mints, lifesavers, or chewing gum. Do not drink alcohol 24hrs before your surgery. Try not to smoke at least 24hrs before your surgery.       Follow the pre surgery showering instructions as listed in the “My Surgical Experience Booklet” or otherwise provided by your surgeon's office. Do not use a blade to shave the surgical area 1 week before surgery. It is okay to use a clean electric clippers up to 24 hours before surgery. Do not apply any lotions, creams, including makeup, cologne, deodorant, or perfumes after showering on the day of your surgery. Do not use dry shampoo, hair spray, hair gel, or any type of hair products.     No contact lenses, eye make-up, or artificial eyelashes. Remove nail polish, including gel polish, and any artificial, gel, or acrylic nails if possible. Remove all jewelry including rings and body piercing jewelry.     Wear causal clothing that is easy to take on and off. Consider your type of surgery.    Keep any valuables, jewelry, piercings at home. Please bring any specially ordered equipment (sling, braces)  if indicated.    Arrange for a responsible person to drive you to and from the hospital on the day of your surgery. Please confirm the visitor policy for the day of your procedure when you receive your phone call with an arrival time.     Call the surgeon's office with any new illnesses, exposures, or additional questions prior to surgery.    Please reference your “My Surgical Experience Booklet” for additional information to prepare for your upcoming surgery.

## 2024-06-10 ENCOUNTER — HOSPITAL ENCOUNTER (OUTPATIENT)
Facility: HOSPITAL | Age: 54
Setting detail: OUTPATIENT SURGERY
Discharge: HOME | End: 2024-06-10
Attending: UROLOGY | Admitting: UROLOGY
Payer: COMMERCIAL

## 2024-06-10 ENCOUNTER — APPOINTMENT (OUTPATIENT)
Dept: RADIOLOGY | Facility: HOSPITAL | Age: 54
End: 2024-06-10
Payer: COMMERCIAL

## 2024-06-10 ENCOUNTER — ANESTHESIA (OUTPATIENT)
Dept: PERIOP | Facility: HOSPITAL | Age: 54
End: 2024-06-10
Payer: COMMERCIAL

## 2024-06-10 VITALS
WEIGHT: 300 LBS | DIASTOLIC BLOOD PRESSURE: 89 MMHG | RESPIRATION RATE: 16 BRPM | BODY MASS INDEX: 42.95 KG/M2 | TEMPERATURE: 97.4 F | HEART RATE: 66 BPM | HEIGHT: 70 IN | OXYGEN SATURATION: 96 % | SYSTOLIC BLOOD PRESSURE: 169 MMHG

## 2024-06-10 DIAGNOSIS — N20.1 CALCULUS OF URETER: ICD-10-CM

## 2024-06-10 LAB
BACTERIA UR QL AUTO: ABNORMAL /HPF
BILIRUB UR QL STRIP: NEGATIVE
CLARITY UR: CLEAR
COLOR UR: YELLOW
GLUCOSE UR STRIP-MCNC: NEGATIVE MG/DL
HGB UR QL STRIP.AUTO: ABNORMAL
KETONES UR STRIP-MCNC: NEGATIVE MG/DL
LEUKOCYTE ESTERASE UR QL STRIP: NEGATIVE
NITRITE UR QL STRIP: NEGATIVE
NON-SQ EPI CELLS URNS QL MICRO: ABNORMAL /HPF
PH UR STRIP.AUTO: 6 [PH]
PROT UR STRIP-MCNC: NEGATIVE MG/DL
RBC #/AREA URNS AUTO: ABNORMAL /HPF
SP GR UR STRIP.AUTO: 1.01
UROBILINOGEN UR QL STRIP.AUTO: 0.2 E.U./DL
WBC #/AREA URNS AUTO: ABNORMAL /HPF

## 2024-06-10 PROCEDURE — 82360 CALCULUS ASSAY QUANT: CPT | Performed by: UROLOGY

## 2024-06-10 PROCEDURE — C1894 INTRO/SHEATH, NON-LASER: HCPCS | Performed by: UROLOGY

## 2024-06-10 PROCEDURE — 81003 URINALYSIS AUTO W/O SCOPE: CPT | Performed by: UROLOGY

## 2024-06-10 PROCEDURE — C2617 STENT, NON-COR, TEM W/O DEL: HCPCS | Performed by: UROLOGY

## 2024-06-10 PROCEDURE — 74018 RADEX ABDOMEN 1 VIEW: CPT

## 2024-06-10 PROCEDURE — 87086 URINE CULTURE/COLONY COUNT: CPT | Performed by: UROLOGY

## 2024-06-10 PROCEDURE — C1747 URETEROSCOPE DIGITAL FLEX SNGL USE STD DEFLECTION APTRA: HCPCS | Performed by: UROLOGY

## 2024-06-10 PROCEDURE — 81001 URINALYSIS AUTO W/SCOPE: CPT | Performed by: UROLOGY

## 2024-06-10 PROCEDURE — 74420 UROGRAPHY RTRGR +-KUB: CPT

## 2024-06-10 PROCEDURE — C1769 GUIDE WIRE: HCPCS | Performed by: UROLOGY

## 2024-06-10 RX ORDER — PROPOFOL 10 MG/ML
INJECTION, EMULSION INTRAVENOUS AS NEEDED
Status: DISCONTINUED | OUTPATIENT
Start: 2024-06-10 | End: 2024-06-10

## 2024-06-10 RX ORDER — FENTANYL CITRATE/PF 50 MCG/ML
25 SYRINGE (ML) INJECTION
Status: DISCONTINUED | OUTPATIENT
Start: 2024-06-10 | End: 2024-06-10 | Stop reason: HOSPADM

## 2024-06-10 RX ORDER — MIDAZOLAM HYDROCHLORIDE 2 MG/2ML
INJECTION, SOLUTION INTRAMUSCULAR; INTRAVENOUS AS NEEDED
Status: DISCONTINUED | OUTPATIENT
Start: 2024-06-10 | End: 2024-06-10

## 2024-06-10 RX ORDER — DIPHENHYDRAMINE HYDROCHLORIDE 50 MG/ML
12.5 INJECTION INTRAMUSCULAR; INTRAVENOUS
Status: DISCONTINUED | OUTPATIENT
Start: 2024-06-10 | End: 2024-06-10 | Stop reason: HOSPADM

## 2024-06-10 RX ORDER — HYDROMORPHONE HCL/PF 1 MG/ML
0.5 SYRINGE (ML) INJECTION
Status: DISCONTINUED | OUTPATIENT
Start: 2024-06-10 | End: 2024-06-10 | Stop reason: HOSPADM

## 2024-06-10 RX ORDER — DEXAMETHASONE SODIUM PHOSPHATE 10 MG/ML
INJECTION, SOLUTION INTRAMUSCULAR; INTRAVENOUS AS NEEDED
Status: DISCONTINUED | OUTPATIENT
Start: 2024-06-10 | End: 2024-06-10

## 2024-06-10 RX ORDER — SODIUM CHLORIDE, SODIUM LACTATE, POTASSIUM CHLORIDE, CALCIUM CHLORIDE 600; 310; 30; 20 MG/100ML; MG/100ML; MG/100ML; MG/100ML
INJECTION, SOLUTION INTRAVENOUS CONTINUOUS PRN
Status: DISCONTINUED | OUTPATIENT
Start: 2024-06-10 | End: 2024-06-10

## 2024-06-10 RX ORDER — FENTANYL CITRATE 50 UG/ML
INJECTION, SOLUTION INTRAMUSCULAR; INTRAVENOUS AS NEEDED
Status: DISCONTINUED | OUTPATIENT
Start: 2024-06-10 | End: 2024-06-10

## 2024-06-10 RX ORDER — LIDOCAINE HYDROCHLORIDE 20 MG/ML
INJECTION, SOLUTION EPIDURAL; INFILTRATION; INTRACAUDAL; PERINEURAL AS NEEDED
Status: DISCONTINUED | OUTPATIENT
Start: 2024-06-10 | End: 2024-06-10

## 2024-06-10 RX ORDER — ONDANSETRON 2 MG/ML
4 INJECTION INTRAMUSCULAR; INTRAVENOUS ONCE AS NEEDED
Status: DISCONTINUED | OUTPATIENT
Start: 2024-06-10 | End: 2024-06-10 | Stop reason: HOSPADM

## 2024-06-10 RX ORDER — CEFTRIAXONE 2 G/50ML
2000 INJECTION, SOLUTION INTRAVENOUS ONCE
Status: COMPLETED | OUTPATIENT
Start: 2024-06-10 | End: 2024-06-10

## 2024-06-10 RX ORDER — MAGNESIUM HYDROXIDE 1200 MG/15ML
LIQUID ORAL AS NEEDED
Status: DISCONTINUED | OUTPATIENT
Start: 2024-06-10 | End: 2024-06-10 | Stop reason: HOSPADM

## 2024-06-10 RX ADMIN — FENTANYL CITRATE 25 MCG: 50 INJECTION INTRAMUSCULAR; INTRAVENOUS at 10:20

## 2024-06-10 RX ADMIN — FENTANYL CITRATE 25 MCG: 50 INJECTION INTRAMUSCULAR; INTRAVENOUS at 08:44

## 2024-06-10 RX ADMIN — MIDAZOLAM 2 MG: 1 INJECTION INTRAMUSCULAR; INTRAVENOUS at 08:32

## 2024-06-10 RX ADMIN — DEXAMETHASONE SODIUM PHOSPHATE 10 MG: 10 INJECTION, SOLUTION INTRAMUSCULAR; INTRAVENOUS at 08:36

## 2024-06-10 RX ADMIN — FENTANYL CITRATE 50 MCG: 50 INJECTION INTRAMUSCULAR; INTRAVENOUS at 09:36

## 2024-06-10 RX ADMIN — SODIUM CHLORIDE, SODIUM LACTATE, POTASSIUM CHLORIDE, AND CALCIUM CHLORIDE: .6; .31; .03; .02 INJECTION, SOLUTION INTRAVENOUS at 08:31

## 2024-06-10 RX ADMIN — LIDOCAINE HYDROCHLORIDE 100 MG: 20 INJECTION, SOLUTION EPIDURAL; INFILTRATION; INTRACAUDAL; PERINEURAL at 08:36

## 2024-06-10 RX ADMIN — PROPOFOL 200 MG: 10 INJECTION, EMULSION INTRAVENOUS at 08:36

## 2024-06-10 RX ADMIN — FENTANYL CITRATE 25 MCG: 50 INJECTION INTRAMUSCULAR; INTRAVENOUS at 09:32

## 2024-06-10 RX ADMIN — CEFTRIAXONE 2000 MG: 2 INJECTION, SOLUTION INTRAVENOUS at 08:40

## 2024-06-10 NOTE — OP NOTE
OPERATIVE REPORT  PATIENT NAME: Fredrick Zamudio    :  1970  MRN: [id number]  Pt Location: [facility]OM 01    SURGERY DATE: 6/10/2024    Surgeons and Role:     * Gualberto Mosley MD - Primary    Preop Diagnosis:  Calculus of ureter [N20.1]    Post-Op Diagnosis Codes:     * Calculus of ureter [N20.1]    Procedure(s):  Right - CYSTOSCOPY; RETROGRADE; URETEROSCOPY; LASER LITHOTRIPSY; STONE BASKETING; STENT EXCHANGE    Specimen(s):  ID Type Source Tests Collected by Time Destination   A :  Urine Urinary Bladder URINE CULTURE, URINALYSIS WITH REFLEX TO SCOPE Gualberto Mosley MD 6/10/2024 0850    B :  Calculus Ureter, Right STONE ANALYSIS Gualberto Mosley MD 6/10/2024 1032        Estimated Blood Loss:   Minimal    Drains:  Colostomy RLQ (Active)   Number of days:        Anesthesia Type:   General    Operative Indications:  Calculus of ureter [N20.1]  This is a 54-year-old male presenting with obstructing calcification at the right UPJ.  This was a very long greater than 2 cm calcification, but it was very thin.  He required stent insertion.  He also passed a 2 mm left ureteral stone that was 90% uric acid and 10% calcium.  Renal scan showed the left kidney was only 11% of his split renal function.  Preoperative CT scan and KUB x-ray failed to show any evidence of the right renal or ureteral calculi.  Options, procedures, benefits and risks were discussed and he agreed to proceed with the planned procedure.    Operative Findings:  Multiple calcifications in the ureter and at the UPJ.  Most were yellow irregular relatively soft stones and some appear to be related to the encrusted stent as they were flat with semicircular indentation.  There were several in the mid ureter at the L4 vertebral level and the largest bulk was at the UPJ laterally.      Complications:   None    Procedure and Technique:  The patient was properly identified in the operating room and after adequate general anesthesia was placed in the  lithotomy position.  The lower abdomen and genitalia were prepped and draped in the usual fashion.  Cystourethroscopy was performed with a 21 Thai cystoscope using 30 degree lens.  Bladder was entered and urine was drained and sent for urinalysis and culture.  The stent was seen in good position from the right ureteral orifice, but was well encrusted with a thin layer of yellowish calcification.  With the aid of an open Thai catheter, a Solo guidewire was passed alongside the stent into the collecting system and externalized and secured as a safety wire.  The cystoscope was reintroduced and the stent was removed with minimal resistance with a flexible grasping forcep.  The cystoscope was reintroduced and a second guidewire was passed up the ureter into the collecting system.  The cystoscope was removed.  A 12/14 Thai 35 cm ureteral access sheath was passed over the wire as far as it would go, which was at the L4/L5 vertebral level.  The inner sheath and wire were removed.  A single use flexible ureteroscope was introduced through the sheath.  Just above the sheath was an area of yellow stone material.  Holmium laser lithotripsy was utilized with a 272 µm fiber on the dusting setting of 0.2 J and 50 Hz.  The stone was treated until very small pieces.  The largest piece was basketed with a 0 tip 4 wire basket and sent as specimen.  The remainder of the ureteral stones were then dusted with the laser including a few smaller stones more proximally.  There was then a larger stone laterally at the UPJ which was treated on the dusting setting and easily fragmented.  Most of these pieces traveled into the upper pole calyx and were treated in this location.  Attempt was made to basket the stones, but most were too small to be basketed.  Some were sent as specimen.  The lower pole calyx was also inspected and did have a small amount of stone fragments which were treated until truly dusted.  Upon completion, the entire  collecting system was inspected and only very tiny fragments remained in the upper pole and lower pole.  The UPJ and ureter were inspected down to the sheath with only truly tiny fragments remaining.  Dilute contrast was instilled through the scope and the collecting system and down the ureter with no extravasation and mild dilation throughout.  The ureter was inspected as the sheath was removed and the ureter appeared intact.  The wire was then backloaded onto the cystoscope.  An open-ended catheter was passed into the collecting system.  Ureteral length was measured and the catheter was removed.  A 7 Belgian 26 cm Bard inlay [place] stent was passed over the wire and the wire was removed leaving a good curl of stent within the renal pelvis and within the bladder.  There was some pieces of stent encrustation within the bladder which were removed with a flexible grasping forcep.  The bladder was then emptied and the cystoscope was removed.  The patient tolerated the procedure well and left the operating room in good condition.   I was present for the entire procedure.    Patient Disposition:  PACU         SIGNATURE: Gualberto Mosley MD  DATE: Belle 10, 2024  TIME: 10:38 AM

## 2024-06-10 NOTE — ANESTHESIA PREPROCEDURE EVALUATION
Procedure:  CYSTOSCOPY; RETROGRADE; URETEROSCOPY; LASER LITHOTRIPSY; STONE BASKETING; STENT EXCHANGE (Right: Bladder)    Relevant Problems   /RENAL   (+) Acute kidney injury (nontraumatic) (HCC)   (+) Hydronephrosis with urterocalculus   (+) Stage 3a chronic kidney disease (HCC)      Past Medical History:   Diagnosis Date    Chronic kidney disease     CPAP (continuous positive airway pressure) dependence     History of transfusion     Hyperlipidemia     Ileus ([facility]) 04/01/2022    Irregular heart beat     Kidney stone     Sleep apnea     Ulcerative colitis ([facility])      Lab Results   Component Value Date    WBC 9.93 05/18/2024    HGB 15.1 05/18/2024    HCT 46.4 05/18/2024    MCV 90 05/18/2024     05/18/2024         Physical Exam    Airway    Mallampati score: III  TM Distance: >3 FB  Neck ROM: full     Dental   No notable dental hx     Cardiovascular  Rhythm: regular, Rate: normal    Pulmonary   Breath sounds clear to auscultation    Other Findings  Intercisor Distance > 3cm          Anesthesia Plan  ASA Score- 3     Anesthesia Type- general with ASA Monitors.         Additional Monitors:     Airway Plan: LMA.    Comment: Discussed benefits/risks of general anesthesia including possibility of mouth/throat pain, injury to lips/teeth, nausea/vomiting, and surgical pain along with more rare complications such as stroke, MI, pneumonia, aspiration, and injury to blood vessels. All questions answered.  .       Plan Factors-Exercise tolerance (METS): >4 METS.    Chart reviewed. EKG reviewed.  Existing labs reviewed.                   Induction- intravenous.    Postoperative Plan- Plan for postoperative opioid use. Planned trial extubation        Informed Consent- Anesthetic plan and risks discussed with patient.  I personally reviewed this patient with the CRNA. Discussed and agreed on the Anesthesia Plan with the CRNA..

## 2024-06-10 NOTE — NURSING NOTE
Patient ambulated to bathroom and urinated.  Patient reports it was a little painful to start urination and a blood clot came out of his penis at the start of urination.  Urine was bloody and there were drops of blood on the floor.  Patient continued to have small to moderate amount of blood dripping from tip of penis.  Guaze held over penis and patient returned to stretcher.  Gauze in place and continuing to monitor.

## 2024-06-10 NOTE — DISCHARGE INSTR - AVS FIRST PAGE
Take cefpodoxime 200 mg twice a day beginning tomorrow 6/11/2024.  Take this for 3 days and then save the remainder to begin the night before stent removal.  My office will call to schedule a CT scan stone protocol to make sure there are no significant visible stones prior to stent removal.  Please call my office after you obtain the CT scan so that Dr. Mosley may review and plan stent removal.

## 2024-06-10 NOTE — NURSING NOTE
Bleeding from tip of penis has subsided.  Scant amount of blood noted on guaze which is on the tip of the penis.

## 2024-06-10 NOTE — INTERVAL H&P NOTE
H&P reviewed. After examining the patient I find no changes in the patients condition since the H&P had been written.    Vitals:    06/10/24 0738   BP: 154/85   Pulse: 58   Resp: 16   Temp: 98.8 °F (37.1 °C)   SpO2: 97%

## 2024-06-10 NOTE — ANESTHESIA POSTPROCEDURE EVALUATION
Post-Op Assessment Note    CV Status:  Stable    Pain management: adequate       Mental Status:  Alert and awake   Hydration Status:  Euvolemic   PONV Controlled:  Controlled   Airway Patency:  Patent  Two or more mitigation strategies used for obstructive sleep apnea   Post Op Vitals Reviewed: Yes    No anethesia notable event occurred.    Staff: Anesthesiologist, CRNA               BP   193/99   Temp 98   Pulse 67   Resp 17   SpO2 98

## 2024-06-10 NOTE — NURSING NOTE
Patient urinated a larger amount.  Patient reports the urine was more clear, however had blood dripping into toilet from tip of penis at the end of urination.  Guaze placed at tip of penis.

## 2024-06-12 LAB — BACTERIA UR CULT: NORMAL

## 2024-06-17 LAB
COLOR STONE: NORMAL
COMMENT-STONE3: NORMAL
COMPOSITION: NORMAL
LABORATORY COMMENT REPORT: NORMAL
PHOTO: NORMAL
SIZE STONE: NORMAL MM
SPEC SOURCE SUBJ: NORMAL
STONE ANALYSIS-IMP: NORMAL
STONE ANALYSIS-IMP: NORMAL
URATE MFR STONE: 100 %
WT STONE: 12 MG

## 2024-06-19 ENCOUNTER — HOSPITAL ENCOUNTER (OUTPATIENT)
Dept: CT IMAGING | Facility: HOSPITAL | Age: 54
Discharge: HOME | End: 2024-06-19
Attending: UROLOGY
Payer: COMMERCIAL

## 2024-06-19 DIAGNOSIS — N20.0 KIDNEY STONE: ICD-10-CM

## 2024-06-19 PROCEDURE — 74176 CT ABD & PELVIS W/O CONTRAST: CPT

## 2024-07-01 ENCOUNTER — OFFICE VISIT (OUTPATIENT)
Dept: NEPHROLOGY | Facility: CLINIC | Age: 54
End: 2024-07-01
Payer: COMMERCIAL

## 2024-07-01 VITALS
HEIGHT: 70 IN | SYSTOLIC BLOOD PRESSURE: 120 MMHG | WEIGHT: 305 LBS | BODY MASS INDEX: 43.67 KG/M2 | DIASTOLIC BLOOD PRESSURE: 78 MMHG | OXYGEN SATURATION: 98 % | HEART RATE: 74 BPM

## 2024-07-01 DIAGNOSIS — E55.9 VITAMIN D DEFICIENCY: ICD-10-CM

## 2024-07-01 DIAGNOSIS — E79.0 HYPERURICEMIA: ICD-10-CM

## 2024-07-01 DIAGNOSIS — N18.31 STAGE 3A CHRONIC KIDNEY DISEASE (HCC): ICD-10-CM

## 2024-07-01 DIAGNOSIS — R79.89 HIGH SERUM PARATHYROID HORMONE (PTH): ICD-10-CM

## 2024-07-01 DIAGNOSIS — N20.0 NEPHROLITHIASIS: Primary | ICD-10-CM

## 2024-07-01 PROCEDURE — 99214 OFFICE O/P EST MOD 30 MIN: CPT | Performed by: NURSE PRACTITIONER

## 2024-07-01 RX ORDER — ERGOCALCIFEROL 1.25 MG/1
50000 CAPSULE ORAL WEEKLY
Qty: 12 CAPSULE | Refills: 1 | Status: SHIPPED | OUTPATIENT
Start: 2024-07-01

## 2024-07-01 RX ORDER — ERGOCALCIFEROL 1.25 MG/1
50000 CAPSULE ORAL WEEKLY
Qty: 12 CAPSULE | Refills: 1 | Status: SHIPPED | OUTPATIENT
Start: 2024-07-01 | End: 2024-07-01 | Stop reason: SDUPTHER

## 2024-07-01 RX ORDER — POTASSIUM CITRATE 10 MEQ/1
10 TABLET, EXTENDED RELEASE ORAL
Qty: 270 TABLET | Refills: 3 | Status: SHIPPED | OUTPATIENT
Start: 2024-07-01

## 2024-07-01 RX ORDER — ALLOPURINOL 100 MG/1
100 TABLET ORAL DAILY
Qty: 90 TABLET | Refills: 1 | Status: SHIPPED | OUTPATIENT
Start: 2024-07-01 | End: 2024-07-01

## 2024-07-01 RX ORDER — ALLOPURINOL 100 MG/1
100 TABLET ORAL DAILY
Qty: 90 TABLET | Refills: 1 | Status: SHIPPED | OUTPATIENT
Start: 2024-07-01

## 2024-07-01 NOTE — PATIENT INSTRUCTIONS
" high oxalate noted as well- Limit foods and drinks with a lot of oxalate. Examples include spinach, rhubarb, strawberries, chocolate, almonds, peanuts, pecans, beets, tea, whole-wheat products, non-dairy animal proteins like meat and eggs, and foods high in added sucrose and fructose, which are types of sugar. Do not take vitamin C or calcium supplements.   Uric acid stones - Limit foods with purines. Examples include oats, whole milk and whole-milk products, asparagus, spinach, and certain meats, fish, and poultry.      Start potassium citrate 10 mEq three times a day and repeat BMP in 1-2 weeks  Start allopurinol 100 mg daily and repeat uric acid 1-2 weeks  Just do them both together  Increase fluid intake to 2.5 liters per day at least  Reduce sodium in diet  Avoid \"sneaky salts\" gatorade/propel etc in drinks    I may consider adding a calcium supplement but want to wait and repeat litholink in about 2-3 months      "

## 2024-07-01 NOTE — PROGRESS NOTES
Nephrology   Office Follow-Up  Fredrick Zamudio 54 y.o. male MRN: [id number]    Encounter: [id number]        Assessment & Plan    Fredrick Zamudio was seen in the [place] office today. All diagnoses and orders for visit:     1. Nephrolithiasis  Litholink done February of 2024 significant for urine volume 2 L, hyperoxaluria, marked hypocitraturia, urine pH 5.079, elevated urine sodium.  It was recommended that he add lemon juice to food/drink  Hospitalized at [facility] from 4/21 - 4/24/2024 for flank pain and hematuria noted to have right hydronephrosis secondary to punctate calculi in the proximal ureter status post ureteral stent placement 4/22/2024.  Patient underwent cystoscopy, retrograde ureteroscopy, laser lithotripsy, stent exchange on 6/10 with urology.  Analysis was significant for 100% uric acid stone.    Reviewed Litholink and lab work in detail with patient.  Recommend potassium citrate 10 mill equivalents 3 times a day, allopurinol 100 mg daily, vitamin D 50,000 units weekly, reducing oxalate in diet, decreasing sodium in diet, increasing hydration and diet for goal urine output 2.5 L/day.  Patient agreeable.  Will repeat lab work in about 2 weeks and repeat Litholink in 1 to 3 months.  -     potassium citrate (UROCIT-K) 10 mEq; Take 1 tablet (10 mEq total) by mouth 3 (three) times a day with meals  -     Litholink Kidney Stone Panel; Future; Expected date: 08/01/2024  -     Uric acid; Future  -     allopurinol (ZYLOPRIM) 100 mg tablet; Take 1 tablet (100 mg total) by mouth daily  2. Stage 3a chronic kidney disease (HCC)  [id number] baseline creatinine 1.3-1.5 mg/dL.  Most recent creatinine 1.41 mg/dL.  Did have acute kidney injury in April associated with hydronephrosis with peak creatinine 3.06.  Etiology decreased renal function in setting of left atrophic kidney with renal function 11%, recurrent JENAE, obesity related  3. Hyperuricemia  As above will place patient on potassium citrate and  allopurinol follow labs plus Litholink  -     potassium citrate (UROCIT-K) 10 mEq; Take 1 tablet (10 mEq total) by mouth 3 (three) times a day with meals  -     Basic metabolic panel; Future; Expected date: 07/15/2024  -     allopurinol (ZYLOPRIM) 100 mg tablet; Take 1 tablet (100 mg total) by mouth daily  4. High serum parathyroid hormone (PTH)  Suspect secondary to vitamin D deficiency.  Will check PTH and vitamin D next labs  -     PTH, intact; Future; Expected date: 07/15/2024  -     Vitamin D 25 hydroxy; Future; Expected date: 07/15/2024  5. Vitamin D deficiency  -     ergocalciferol (Drisdol) 1.25 MG (11103 UT) capsule; Take 1 capsule (50,000 Units total) by mouth once a week  6.  Left renal atrophy  Status post Lasix function and flow study significant for differential renal function 11% on the left and 89% on the right      HPI: Fredrick Zamudio is a 54 y.o. male who is here for scheduled follow-up     Pertinent problems include CKD 3 AA with severe acute kidney injury in 2022 peak creatinine 5.2 mg/dL, morbid obesity, ulcerative colitis status post total colectomy, parastomal hernia, nephrolithiasis, hyperlipidemia, hyperuricemia    Litholink done February of 2024 significant for urine volume 2 L, hyperoxaluria, marked hypocitraturia, urine pH 5.079, elevated urine sodium.  It was recommended that he add lemon juice to food/drink    Hospitalized at [facility] from 4/21 - 4/24/2024 for flank pain and hematuria noted to have right hydronephrosis secondary to punctate calculi in the proximal ureter status post ureteral stent placement 4/22/2024.  Patient underwent cystoscopy, retrograde ureteroscopy, laser lithotripsy, stent exchange on 6/10 with urology.  Analysis was significant for 100% uric acid stone.      Reviewed Litholink and lab work in detail with patient.  Recommend potassium citrate 10 mill equivalents 3 times a day, allopurinol 100 mg daily, vitamin D 50,000 units weekly, reducing oxalate  in diet, decreasing sodium in diet, increasing hydration and diet for goal urine output 2.5 L/day.  Patient agreeable.  Will repeat lab work in about 2 weeks and repeat Litholink in 1 to 3 months.      ROS:   Review of Systems   Constitutional:  Negative for chills and fever.        Diarrhea from Pravachol   HENT:  Negative for ear pain and sore throat.    Eyes:  Negative for pain and visual disturbance.   Respiratory:  Negative for cough and shortness of breath.    Cardiovascular:  Negative for chest pain and palpitations.   Gastrointestinal:  Negative for abdominal pain and vomiting.   Genitourinary:  Negative for dysuria and hematuria.   Musculoskeletal:  Negative for arthralgias and back pain.   Skin:  Negative for color change and rash.   Neurological:  Negative for seizures and syncope.   All other systems reviewed and are negative.      Allergies: Patient has no known allergies.    Medications:   Current Outpatient Medications:     allopurinol (ZYLOPRIM) 100 mg tablet, Take 1 tablet (100 mg total) by mouth daily, Disp: 90 tablet, Rfl: 1    ergocalciferol (Drisdol) 1.25 MG (27134 UT) capsule, Take 1 capsule (50,000 Units total) by mouth once a week, Disp: 12 capsule, Rfl: 1    potassium citrate (UROCIT-K) 10 mEq, Take 1 tablet (10 mEq total) by mouth 3 (three) times a day with meals, Disp: 270 tablet, Rfl: 3    tamsulosin (FLOMAX) 0.4 mg, Take 1 capsule (0.4 mg total) by mouth daily with dinner, Disp: 30 capsule, Rfl: 0    Past Medical History:   Diagnosis Date    Chronic kidney disease     CPAP (continuous positive airway pressure) dependence     History of transfusion     Hyperlipidemia     Ileus (HCC) 04/01/2022    Irregular heart beat     Kidney stone     Sleep apnea     Ulcerative colitis (HCC)      Past Surgical History:   Procedure Laterality Date    COLONOSCOPY      COLOSTOMY  2005    CYSTOSCOPY W/ LASER LITHOTRIPSY Right 6/10/2024    Procedure: CYSTOSCOPY; RETROGRADE; URETEROSCOPY; LASER LITHOTRIPSY;  "STONE BASKETING; STENT EXCHANGE;  Surgeon: Gualberto Mosley MD;  Location: [facility];  Service: Urology    FL RETROGRADE PYELOGRAM  04/22/2024    FL RETROGRADE PYELOGRAM  6/10/2024    ILEOSTOMY Right 2007    MULTIPLE TOOTH EXTRACTIONS      KY CYSTO BLADDER W/URETERAL CATHETERIZATION Right 04/22/2024    Procedure: CYSTOSCOPY RETROGRADE PYELOGRAM WITH INSERTION STENT URETERAL;  Surgeon: Gualberto Mosley MD;  Location: [facility];  Service: Urology     Family History   Problem Relation Age of Onset    Alcohol abuse Mother     Alzheimer's disease Father     Cancer Sister       reports that he has never smoked. He has never used smokeless tobacco. He reports that he does not currently use alcohol. He reports that he does not use drugs.      Physical Exam:   Vitals:    07/01/24 1558   BP: 120/78   BP Location: Left arm   Patient Position: Sitting   Cuff Size: Large   Pulse: 74   SpO2: 98%   Weight: (!) 138 kg (305 lb)   Height: 5' 10\" (1.778 m)     Body mass index is 43.76 kg/m².    General: conscious, cooperative, in no acute distress, appears stated age  Eyes: conjunctivae pale, anicteric sclerae  ENT: lips and mucous membranes moist  Neck: supple, no JVD, no masses  Chest:  essentially clear breath sounds bilaterally, no crackles, ronchus or wheezings  CVS: S1 & S2, normal rate, regular rhythm  Abdomen: soft, non-tender, non-distended, normoactive bowel sounds, rounded  Extremities: no edema of both legs  Skin: no rash   Neuro: awake, alert, oriented       Diagnostic Data:  Lab: I have personally reviewed pertinent lab results.,   CBC:       CMP: No results found for: \"SODIUM\", \"K\", \"CL\", \"CO2\", \"ANIONGAP\", \"BUN\", \"CREATININE\", \"GLUCOSE\", \"CALCIUM\", \"AST\", \"ALT\", \"ALKPHOS\", \"PROT\", \"BILITOT\", \"EGFR\",   PT/INR: No results found for: \"PT\", \"INR\",   Magnesium: No components found for: \"MAG\",  Phosphorous: No results found for: \"PHOS\"    Patient Instructions    high oxalate noted as well- Limit foods and drinks with a lot " "of oxalate. Examples include spinach, rhubarb, strawberries, chocolate, almonds, peanuts, pecans, beets, tea, whole-wheat products, non-dairy animal proteins like meat and eggs, and foods high in added sucrose and fructose, which are types of sugar. Do not take vitamin C or calcium supplements.   Uric acid stones - Limit foods with purines. Examples include oats, whole milk and whole-milk products, asparagus, spinach, and certain meats, fish, and poultry.      Start potassium citrate 10 mEq three times a day and repeat BMP in 1-2 weeks  Start allopurinol 100 mg daily and repeat uric acid 1-2 weeks  Just do them both together  Increase fluid intake to 2.5 liters per day at least  Reduce sodium in diet  Avoid \"sneaky salts\" gatorade/propel etc in drinks    I may consider adding a calcium supplement but want to wait and repeat litholink in about 2-3 months        Portions of the record may have been created with voice recognition software. Occasional wrong word or \"sound a like\" substitutions may have occurred due to the inherent limitations of voice recognition software. Read the chart carefully and recognize, using context, where substitutions have occurred.    If you have any questions, please contact the dictating provider.  "

## 2024-07-20 ENCOUNTER — HOSPITAL ENCOUNTER (OUTPATIENT)
Dept: ULTRASOUND IMAGING | Facility: HOSPITAL | Age: 54
Discharge: HOME | End: 2024-07-20
Attending: UROLOGY
Payer: COMMERCIAL

## 2024-07-20 DIAGNOSIS — N20.0 KIDNEY STONE: ICD-10-CM

## 2024-07-20 DIAGNOSIS — N20.1 CALCULUS OF URETER: ICD-10-CM

## 2024-07-20 PROCEDURE — 76775 US EXAM ABDO BACK WALL LIM: CPT

## 2024-08-06 ENCOUNTER — HOSPITAL ENCOUNTER (OUTPATIENT)
Dept: CT IMAGING | Facility: HOSPITAL | Age: 54
Discharge: HOME | End: 2024-08-06
Attending: UROLOGY
Payer: COMMERCIAL

## 2024-08-06 DIAGNOSIS — N20.0 CALCULUS OF KIDNEY: ICD-10-CM

## 2024-08-06 PROCEDURE — 74176 CT ABD & PELVIS W/O CONTRAST: CPT

## 2024-11-14 ENCOUNTER — LAB REQUISITION (OUTPATIENT)
Dept: LAB | Facility: HOSPITAL | Age: 54
End: 2024-11-14
Payer: COMMERCIAL

## 2024-11-14 DIAGNOSIS — R31.29 OTHER MICROSCOPIC HEMATURIA: ICD-10-CM

## 2024-11-14 LAB
BACTERIA UR QL AUTO: ABNORMAL /HPF
BILIRUB UR QL STRIP: NEGATIVE
CLARITY UR: ABNORMAL
COLOR UR: ABNORMAL
GLUCOSE UR STRIP-MCNC: NEGATIVE MG/DL
HGB UR QL STRIP.AUTO: ABNORMAL
KETONES UR STRIP-MCNC: NEGATIVE MG/DL
LEUKOCYTE ESTERASE UR QL STRIP: NEGATIVE
MUCOUS THREADS UR QL AUTO: ABNORMAL
NITRITE UR QL STRIP: NEGATIVE
NON-SQ EPI CELLS URNS QL MICRO: ABNORMAL /HPF
PH UR STRIP.AUTO: 5 [PH]
PROT UR STRIP-MCNC: NEGATIVE MG/DL
RBC #/AREA URNS AUTO: ABNORMAL /HPF
SP GR UR STRIP.AUTO: 1.02 (ref 1–1.03)
UROBILINOGEN UR STRIP-ACNC: <2 MG/DL
WBC #/AREA URNS AUTO: ABNORMAL /HPF

## 2024-11-14 PROCEDURE — 81001 URINALYSIS AUTO W/SCOPE: CPT | Performed by: UROLOGY

## 2025-01-02 ENCOUNTER — TELEPHONE (OUTPATIENT)
Dept: NEPHROLOGY | Facility: CLINIC | Age: 55
End: 2025-01-02

## 2025-01-07 ENCOUNTER — APPOINTMENT (OUTPATIENT)
Dept: LAB | Facility: CLINIC | Age: 55
End: 2025-01-07
Payer: COMMERCIAL

## 2025-01-07 DIAGNOSIS — E79.0 HYPERURICEMIA: ICD-10-CM

## 2025-01-07 DIAGNOSIS — R79.89 HIGH SERUM PARATHYROID HORMONE (PTH): ICD-10-CM

## 2025-01-07 DIAGNOSIS — N20.0 NEPHROLITHIASIS: ICD-10-CM

## 2025-01-07 LAB
25(OH)D3 SERPL-MCNC: 24 NG/ML (ref 30–100)
ANION GAP SERPL CALCULATED.3IONS-SCNC: 8 MMOL/L (ref 4–13)
BUN SERPL-MCNC: 19 MG/DL (ref 5–25)
CALCIUM SERPL-MCNC: 9.4 MG/DL (ref 8.4–10.2)
CHLORIDE SERPL-SCNC: 106 MMOL/L (ref 96–108)
CO2 SERPL-SCNC: 25 MMOL/L (ref 21–32)
CREAT SERPL-MCNC: 1.37 MG/DL (ref 0.6–1.3)
GFR SERPL CREATININE-BSD FRML MDRD: 57 ML/MIN/1.73SQ M
GLUCOSE P FAST SERPL-MCNC: 101 MG/DL (ref 65–99)
POTASSIUM SERPL-SCNC: 4.2 MMOL/L (ref 3.5–5.3)
PTH-INTACT SERPL-MCNC: 90.4 PG/ML (ref 12–88)
SODIUM SERPL-SCNC: 139 MMOL/L (ref 135–147)
URATE SERPL-MCNC: 9.6 MG/DL (ref 3.5–8.5)

## 2025-01-07 PROCEDURE — 80048 BASIC METABOLIC PNL TOTAL CA: CPT

## 2025-01-07 PROCEDURE — 82306 VITAMIN D 25 HYDROXY: CPT

## 2025-01-07 PROCEDURE — 84550 ASSAY OF BLOOD/URIC ACID: CPT

## 2025-01-07 PROCEDURE — 83970 ASSAY OF PARATHORMONE: CPT

## 2025-01-07 PROCEDURE — 36415 COLL VENOUS BLD VENIPUNCTURE: CPT

## 2025-01-08 ENCOUNTER — OFFICE VISIT (OUTPATIENT)
Age: 55
End: 2025-01-08
Payer: COMMERCIAL

## 2025-01-08 VITALS
WEIGHT: 315 LBS | TEMPERATURE: 98.9 F | OXYGEN SATURATION: 98 % | BODY MASS INDEX: 45.1 KG/M2 | SYSTOLIC BLOOD PRESSURE: 136 MMHG | HEART RATE: 88 BPM | DIASTOLIC BLOOD PRESSURE: 80 MMHG | HEIGHT: 70 IN

## 2025-01-08 DIAGNOSIS — E55.9 VITAMIN D DEFICIENCY: ICD-10-CM

## 2025-01-08 DIAGNOSIS — N20.0 NEPHROLITHIASIS: Primary | ICD-10-CM

## 2025-01-08 DIAGNOSIS — E21.3 HYPERPARATHYROIDISM (HCC): ICD-10-CM

## 2025-01-08 DIAGNOSIS — N18.31 STAGE 3A CHRONIC KIDNEY DISEASE (HCC): ICD-10-CM

## 2025-01-08 DIAGNOSIS — E79.0 HYPERURICEMIA: ICD-10-CM

## 2025-01-08 PROCEDURE — 99214 OFFICE O/P EST MOD 30 MIN: CPT | Performed by: INTERNAL MEDICINE

## 2025-01-08 RX ORDER — ALLOPURINOL 100 MG/1
100 TABLET ORAL DAILY
Qty: 90 TABLET | Refills: 1 | Status: SHIPPED | OUTPATIENT
Start: 2025-01-08

## 2025-01-08 RX ORDER — ERGOCALCIFEROL 1.25 MG/1
50000 CAPSULE ORAL WEEKLY
Qty: 12 CAPSULE | Refills: 1 | Status: SHIPPED | OUTPATIENT
Start: 2025-01-08

## 2025-01-08 NOTE — PATIENT INSTRUCTIONS
Refilled vitamin D and allopurinol.   Recommend to repeat 24 hour to decide on medication changes.  Recommend calling 2 weeks after doing the test.   Maintain a normal calcium intake 800-1200mg per day.  Follow a low oxalate diet.

## 2025-01-08 NOTE — PROGRESS NOTES
Assessment & Plan:    1. Nephrolithiasis  -     allopurinol (ZYLOPRIM) 100 mg tablet; Take 1 tablet (100 mg total) by mouth daily  -     Urinalysis with microscopic; Future; Expected date: 06/16/2025  -     Albumin / creatinine urine ratio; Future; Expected date: 06/16/2025  -     CBC and differential; Future; Expected date: 06/16/2025  -     Comprehensive metabolic panel; Future; Expected date: 06/16/2025  -     PTH, intact; Future; Expected date: 06/16/2025  -     Uric acid; Future; Expected date: 06/16/2025  -     Litholink Kidney Stone Panel; Future  2. Vitamin D deficiency  -     ergocalciferol (Drisdol) 1.25 MG (65987 UT) capsule; Take 1 capsule (50,000 Units total) by mouth once a week  3. Hyperuricemia  -     allopurinol (ZYLOPRIM) 100 mg tablet; Take 1 tablet (100 mg total) by mouth daily  4. Hyperparathyroidism (HCC)  -     Urinalysis with microscopic; Future; Expected date: 06/16/2025  -     Albumin / creatinine urine ratio; Future; Expected date: 06/16/2025  -     CBC and differential; Future; Expected date: 06/16/2025  -     Comprehensive metabolic panel; Future; Expected date: 06/16/2025  -     PTH, intact; Future; Expected date: 06/16/2025  -     Uric acid; Future; Expected date: 06/16/2025  5. Stage 3a chronic kidney disease (HCC)       Nephrolithiasis, stone analysis in the past 100% uric acid, 6/10/24.   Litholink performed 2/2024 and showed urine volume 2 L, hyperoxaluria, marked hypocitraturia, urine pH 5.079, elevated urine sodium. Patient started on potassium citrate 10 meq TID, alliopurinol 100mg/day, reduce oxalate in diet, decrease sodium in diet and increase hydration to urine volume goal 2.5 L at last visit 7/1/24.  Patient advised to repeat 24 hr urine but did not complete as of yet. Advise to repeat 24 hr urine to guide management.  Continue allopurinol therapy 100mg/day.  Encourage hydration to produce urine volume 2.5 L/day.    Continue urology follow up for definitive stone  management.Most recent imaging 8/2024.   Maintain normal calcium intake 800-1200mg/day.   Follow low oxalate diet.    2. Vitamin D deficiency complicated by total colectomy in the past. 25 vitamin D 24.0 1/7/25.   Continue Vitamin D 50,000 IU/week.    3. Hyperuricemia with hx uric acid stones.  Managed on allopurinol 100mg/day but stopped taking 1 month ago.   Advise to continue allopurinol therapy.    4. Hyperparathyroidism, likely secondary due to vitamin D deficiency.  Continue vitamin D supplementation 50,000 iu/week.   PTH 90.4, mildly elevated.    Follow up in 6 months with labs prior.     5. CKD3a  CKD3a, baseline Cr 1.3-1.5 mg/dl. Patient has hx JENAE April 2022 with peak Cr 5.2 associated with hydronephrosis. Etiology CKD 2/2 atrophic left kidney ,obstructive uropathy, recurrent JENAE, obesity related.   1/7/25 Cr 1.37 with eGFR 57 ml/min.    The benefits, risks and alternatives to the treatment plan were discussed at this visit. Patient was advised of common adverse effects of any medical therapies prescribed. All questions were answered and discussed with the patient and any accompanying family members or caretakers.      Subjective:      Patient ID: Fredrick Zamudio is a 54 y.o. male presents for follow up of nephrolithiasis in the [place] office. Patient last seen 7/1/24.   Patient has hx CKD3a, JENAE, in 2022 peak Cr 5.2, obesity, ulcerative colitis sp total colectomy, nephrolithiasis, hyperurciemia.    HPI  In the interim since last visit, patient following with urology for stone management. Currently follows with Dr Mosley and reports right kidney clear of stones and left kidney has a few stones. His left kidney is only 20% functional and has atrophy. He is not recommended any intervention at this time and is asymptomatic.  Denies passing stones actively.  Last visit he was advised to start K citrate 10 meq BID, allopurinol 100mg/day, vitamin D 50,000 unit weekly, reducing oxalate and sodium in his diet.  "Reports compliance with potassium citrate but ran out of allopurinol 1 month ago. Denies hx gout. Most recent uric acid test 9.6 1/7/25.     He has cut back on soda intake. He is adding flavor droplets to water.  Taking     Denies flank pain/blood in urine/LUTS.  Denies chest pain/sob.     He has some weight loss but goes back to his old habits. Previously talking with nutritionist.       Denies repeat 24 hr urine since last visit in July. Patient had litholink Feb 2024 and showed urine volume 2 L, hyperoxaluria, hypocitraturia, urine ph 5.079 and elevated urine sodium.    Last renal imaging 8/6/24.     The following portions of the patient's history were reviewed and updated as appropriate: allergies, current medications, past family history, past medical history, past social history, past surgical history, and problem list.    Review of Systems   Respiratory: Negative.     Cardiovascular: Negative.    Gastrointestinal: Negative.    Genitourinary: Negative.    Neurological: Negative.    All other systems reviewed and are negative.        Objective:      /80   Pulse 88   Temp 98.9 °F (37.2 °C)   Ht 5' 10\" (1.778 m)   Wt (!) 144 kg (318 lb)   SpO2 98%   BMI 45.63 kg/m²          Physical Exam  Vitals reviewed.   Constitutional:       General: He is not in acute distress.     Appearance: He is obese. He is not ill-appearing.   HENT:      Nose: Nose normal. No congestion.      Mouth/Throat:      Mouth: Mucous membranes are moist.      Pharynx: Oropharynx is clear.   Cardiovascular:      Rate and Rhythm: Normal rate and regular rhythm.      Heart sounds:      No friction rub.   Pulmonary:      Breath sounds: Normal breath sounds. No wheezing or rales.   Abdominal:      General: There is no distension.      Palpations: Abdomen is soft.      Tenderness: There is no guarding.   Musculoskeletal:      Right lower leg: No edema.      Left lower leg: No edema.   Skin:     Coloration: Skin is not jaundiced.      " "Findings: No bruising.   Neurological:      General: No focal deficit present.      Mental Status: He is alert and oriented to person, place, and time. Mental status is at baseline.      Cranial Nerves: No cranial nerve deficit.   Psychiatric:         Mood and Affect: Mood normal.         Behavior: Behavior normal.             Lab Results   Component Value Date    SODIUM 139 01/07/2025    K 4.2 01/07/2025     01/07/2025    CO2 25 01/07/2025    AGAP 8 01/07/2025    BUN 19 01/07/2025    CREATININE 1.37 (H) 01/07/2025    GLUC 106 (H) 11/19/2024    GLUF 101 (H) 01/07/2025    CALCIUM 9.4 01/07/2025    AST 16 11/19/2024    ALT 13 11/19/2024    ALKPHOS 70 11/19/2024    TP 7.1 11/19/2024    TBILI 0.6 11/19/2024    EGFR 57 01/07/2025      Lab Results   Component Value Date    CREATININE 1.37 (H) 01/07/2025    CREATININE 1.39 (H) 11/19/2024    CREATININE 1.41 (H) 06/06/2024    CREATININE 1.42 (H) 05/18/2024    CREATININE 1.47 (H) 05/17/2024    CREATININE 1.45 (H) 05/06/2024    CREATININE 1.57 (H) 04/24/2024    CREATININE 2.14 (H) 04/23/2024    CREATININE 3.06 (H) 04/22/2024    CREATININE 2.10 (H) 04/21/2024    CREATININE 1.91 (H) 04/21/2024    CREATININE 1.33 (H) 12/26/2023    CREATININE 1.38 (H) 10/31/2023    CREATININE 1.39 (H) 06/27/2023    CREATININE 1.42 (H) 05/05/2023      Lab Results   Component Value Date    COLORU Light Orange 11/14/2024    CLARITYU Extra Turbid 11/14/2024    SPECGRAV 1.019 11/14/2024    PHUR 5.0 11/14/2024    LEUKOCYTESUR Negative 11/14/2024    NITRITE Negative 11/14/2024    PROTEIN UA Negative 11/14/2024    GLUCOSEU Negative 11/14/2024    KETONESU Negative 11/14/2024    UROBILINOGEN <2.0 11/14/2024    BILIRUBINUR Negative 11/14/2024    BLOODU Trace (A) 11/14/2024    RBCUA 1-2 11/14/2024    WBCUA None Seen 11/14/2024    EPIS None Seen 11/14/2024    BACTERIA None Seen 11/14/2024      No results found for: \"LABPROT\"  No results found for: \"MICROALBUR\", \"YNMT11UCP\"  Lab Results   Component Value " "Date    WBC 9.93 05/18/2024    HGB 15.1 05/18/2024    HCT 46.4 05/18/2024    MCV 90 05/18/2024     05/18/2024      Lab Results   Component Value Date    HGB 15.1 05/18/2024    HGB 13.7 04/23/2024    HGB 13.8 04/22/2024    HGB 15.7 04/21/2024    HGB 15.0 06/27/2023      No results found for: \"IRON\", \"TIBC\", \"FERRITIN\"   No results found for: \"PTHCALCIUM\", \"DHFR21ZROKME\", \"PHOSPHORUS\"   Lab Results   Component Value Date    TRIG 226 (H) 10/31/2023      Lab Results   Component Value Date    URICACID 9.6 (H) 01/07/2025      Lab Results   Component Value Date    HGBA1C 5.7 (H) 06/27/2023      No results found for: \"TSHANTIBODY\", \"D8FCSBM\", \"FREET4\"   No results found for: \"GARRETT\", \"DSDNAAB\", \"RFIGM\"   No results found for: \"PROT\", \"UPEP\", \"IMMUNOFIX\", \"KAPPALAMBDA\", \"KAPPALIGHT\"     Portions of the record may have been created with voice recognition software. Occasional wrong word or \"sound a like\" substitutions may have occurred due to the inherent limitations of voice recognition software. Read the chart carefully and recognize, using context, where substitutions have occurred. If you have any questions, please contact the dictating provider.  "

## 2025-01-10 ENCOUNTER — TELEPHONE (OUTPATIENT)
Age: 55
End: 2025-01-10

## 2025-02-17 ENCOUNTER — OFFICE VISIT (OUTPATIENT)
Dept: URGENT CARE | Facility: CLINIC | Age: 55
End: 2025-02-17
Payer: COMMERCIAL

## 2025-02-17 VITALS
BODY MASS INDEX: 45.1 KG/M2 | HEIGHT: 70 IN | WEIGHT: 315 LBS | DIASTOLIC BLOOD PRESSURE: 72 MMHG | OXYGEN SATURATION: 98 % | SYSTOLIC BLOOD PRESSURE: 124 MMHG | TEMPERATURE: 100.3 F | HEART RATE: 97 BPM | RESPIRATION RATE: 18 BRPM

## 2025-02-17 DIAGNOSIS — R68.89 FLU-LIKE SYMPTOMS: Primary | ICD-10-CM

## 2025-02-17 DIAGNOSIS — R05.1 ACUTE COUGH: ICD-10-CM

## 2025-02-17 LAB
SARS-COV-2 AG UPPER RESP QL IA: NEGATIVE
VALID CONTROL: NORMAL

## 2025-02-17 PROCEDURE — 87811 SARS-COV-2 COVID19 W/OPTIC: CPT | Performed by: ORTHOPAEDIC SURGERY

## 2025-02-17 PROCEDURE — 99213 OFFICE O/P EST LOW 20 MIN: CPT | Performed by: ORTHOPAEDIC SURGERY

## 2025-02-17 RX ORDER — BROMPHENIRAMINE MALEATE, PSEUDOEPHEDRINE HYDROCHLORIDE, AND DEXTROMETHORPHAN HYDROBROMIDE 2; 30; 10 MG/5ML; MG/5ML; MG/5ML
5 SYRUP ORAL 4 TIMES DAILY PRN
Qty: 120 ML | Refills: 0 | Status: SHIPPED | OUTPATIENT
Start: 2025-02-17

## 2025-02-17 RX ORDER — OSELTAMIVIR PHOSPHATE 75 MG/1
75 CAPSULE ORAL EVERY 12 HOURS SCHEDULED
Qty: 10 CAPSULE | Refills: 0 | Status: SHIPPED | OUTPATIENT
Start: 2025-02-17 | End: 2025-02-22

## 2025-02-17 NOTE — PROGRESS NOTES
[facility]        NAME: Fredrick Zamudio is a 54 y.o. male  : 1970    MRN: [id number]  DATE: 2025  TIME: 11:47 AM    Assessment and Plan   Flu-like symptoms [R68.89]  1. Flu-like symptoms  brompheniramine-pseudoephedrine-DM 30-2-10 MG/5ML syrup    oseltamivir (TAMIFLU) 75 mg capsule      2. Acute cough  Poct Covid 19 Rapid Antigen Test        POCT COVID negative. Will treat for influenza with Tamiflu.     Patient Instructions       Most upper respiratory infections are viral and resolve on their own within 10-14 days. Antibiotics are not indicated for the viral infection, and are only prescribed if there is evidence for a bacterial infection. Viral infections are the most common, with bacterial infections only accounting for 0.5-2 percent of cases. Sometimes an upper respiratory infection may lead to secondary bacterial infection, such as bacterial sinusitis, in which case antibiotics would be indicated at that time. If your symptoms continue beyond 10-14 days or if you experience ongoing fevers, productive cough with green, brown, bloody phlegm production, you may have developed a bacterial infection. For the uncomplicated viral upper respiratory infection conservative management includes:    Fever and pain control:  Ibuprofen (Motrin) 600mg every 6 hours for fever, headaches, body aches   Ibuprofen is an NSAID. Please stop medication if you experience stomach/abdominal pain and report to your primary care provider.   Ask your primary care provider before you take NSAIDs if you are on any blood thinners, or if you have a history of heart disease, kidney disease, gastric bypass surgery, GI bleed, or poorly controlled high blood pressure.   May use acetaminophen (Tylenol) as directed on the bottle between doses of ibuprofen. Do not exceed 4,000mg of Tylenol a day.   Cough & Congestion:  Guaifenesin (Mucinex) as directed on the bottle for congestion and mucous-y cough.   Dextromethorphan  (Delsym, Robitussin) for dry cough and cough suppression   Pseudoephedrine (Sudafed) for congestion and sinus pressure   Sudafed may cause increased heart rate, irregular heart rate, and an increase in blood pressure. Please do not take Sudafed if you have a history of heart disease or high blood pressure.   Sudafed should not be taken if you are on anti-depressants such as those belonging to the class MAOIs or tricyclics.  Coricidin HBP (chlorpheniramine maleate) can be used as a decongestant in place of other options for those unable to take Sudafed.   Combination cough and cold such as Dimetapp and Mucinex DM also available  Sudafed PE Head Congestion +Flu Severe contains a combination of Sudafed, Tylenol, Mucinex, and Delsym  If prescribed, take Tessalon Pearles or Bromfed/Phenergan DM as directed  Avoid taking prescription cough/congestion medication and OTC options at the same time  Sore Throat:  Cepacol lozenges  Chloraseptic spray  Throat Coat tea  Warm salt water gargles   Vitamin/Minerals:  Vitamin D3 2,000 IU daily  Vitamin C 1000mg twice a day  Some studies suggest that Zinc 12.5-15mg every 2 hours while awake for 5 days may shorten symptom duration by 1-2 days  Other:   Plenty of fluids and rest  Cool mist humidifiers  Nasal sinus rinses such as NettiPot, Neimed, or Navage can be used to help flush out sinuses  Please only use distilled/sterile water that can be purchased at your local pharmacy  Nasal spray options:  Nasal steroid sprays such as Flonase, Nasonex, Nasacort may help with sinus congestion, itchy/watery eyes, clogged ears  These options must be used consistently for at least 2 weeks for full effect  Afrin nasal spray for quick acting congestion relief  Saline nasal spray for dry nose, irritation of the nasal passages  Follow up with PCP in 3-5 days  Proceed to the ED if symptoms worsen      If tests are performed, our office will contact you with results only if changes need to made to the  care plan discussed with you at the visit. You can review your full results on [facility].    Chief Complaint     Chief Complaint   Patient presents with    Cold Like Symptoms     Patient c/o body aches, watery eyes, chills, jaw pain, cough and sneezing that started on Saturday.         History of Present Illness       54-year-old male presents to the urgent care for evaluation of cough, body aches, congestion.  Symptoms started Saturday.  He denies any fevers at home, but has had a lot of chills.  He denies any nausea, vomit, diarrhea.  He notes that there are coworkers who are currently sick.  He denies any history of asthma, COPD, smoking.  For symptom relief he has been using over-the-counter inhalers and nasal rinses.        Review of Systems   Review of Systems   Constitutional:  Positive for chills and fatigue. Negative for fever.   HENT:  Positive for congestion and sinus pressure. Negative for ear pain and sore throat.    Eyes:  Negative for pain and visual disturbance.   Respiratory:  Negative for cough and shortness of breath.    Cardiovascular:  Negative for chest pain and palpitations.   Gastrointestinal:  Negative for abdominal pain, diarrhea, nausea and vomiting.   Genitourinary:  Negative for dysuria and hematuria.   Musculoskeletal:  Positive for myalgias. Negative for arthralgias and back pain.   Skin:  Negative for color change and rash.   Neurological:  Negative for dizziness, seizures, syncope, light-headedness and headaches.   All other systems reviewed and are negative.        Current Medications       Current Outpatient Medications:     allopurinol (ZYLOPRIM) 100 mg tablet, Take 1 tablet (100 mg total) by mouth daily, Disp: 90 tablet, Rfl: 1    brompheniramine-pseudoephedrine-DM 30-2-10 MG/5ML syrup, Take 5 mL by mouth 4 (four) times a day as needed for cough or congestion, Disp: 120 mL, Rfl: 0    ergocalciferol (Drisdol) 1.25 MG (58440 UT) capsule, Take 1 capsule (50,000 Units total)  by mouth once a week, Disp: 12 capsule, Rfl: 1    oseltamivir (TAMIFLU) 75 mg capsule, Take 1 capsule (75 mg total) by mouth every 12 (twelve) hours for 5 days, Disp: 10 capsule, Rfl: 0    potassium citrate (UROCIT-K) 10 mEq, Take 1 tablet (10 mEq total) by mouth 3 (three) times a day with meals, Disp: 270 tablet, Rfl: 3    tamsulosin (FLOMAX) 0.4 mg, Take 1 capsule (0.4 mg total) by mouth daily with dinner (Patient not taking: Reported on 1/8/2025), Disp: 30 capsule, Rfl: 0    Current Allergies     Allergies as of 02/17/2025 - Reviewed 02/17/2025   Allergen Reaction Noted    Lactose - food allergy Other (See Comments) 02/17/2025            The following portions of the patient's history were reviewed and updated as appropriate: allergies, current medications, past family history, past medical history, past social history, past surgical history and problem list.     Past Medical History:   Diagnosis Date    Chronic kidney disease     CPAP (continuous positive airway pressure) dependence     History of transfusion     Hyperlipidemia     Ileus (HCC) 04/01/2022    Irregular heart beat     Kidney stone     Sleep apnea     Ulcerative colitis (HCC)        Past Surgical History:   Procedure Laterality Date    COLONOSCOPY      COLOSTOMY  2005    CYSTOSCOPY W/ LASER LITHOTRIPSY Right 6/10/2024    Procedure: CYSTOSCOPY; RETROGRADE; URETEROSCOPY; LASER LITHOTRIPSY; STONE BASKETING; STENT EXCHANGE;  Surgeon: Gualberto Mosley MD;  Location: [facility];  Service: Urology    FL RETROGRADE PYELOGRAM  04/22/2024    FL RETROGRADE PYELOGRAM  6/10/2024    ILEOSTOMY Right 2007    MULTIPLE TOOTH EXTRACTIONS      OK CYSTOURETHROSCOPY W/URETERAL CATHETERIZATION Right 04/22/2024    Procedure: CYSTOSCOPY RETROGRADE PYELOGRAM WITH INSERTION STENT URETERAL;  Surgeon: Gualberto Mosley MD;  Location: [facility];  Service: Urology       Family History   Problem Relation Age of Onset    Alcohol abuse Mother     Alzheimer's disease Father      "Cancer Sister          Medications have been verified.        Objective   /72 (Patient Position: Sitting, Cuff Size: Large)   Pulse 97   Temp 100.3 °F (37.9 °C) (Temporal)   Resp 18   Ht 5' 10\" (1.778 m)   Wt (!) 144 kg (318 lb)   SpO2 98%   BMI 45.63 kg/m²        Physical Exam     Physical Exam  Vitals and nursing note reviewed.   Constitutional:       General: He is not in acute distress.     Appearance: Normal appearance. He is not ill-appearing.   HENT:      Head: Normocephalic and atraumatic.      Right Ear: Tympanic membrane normal.      Left Ear: Tympanic membrane normal.      Nose: Nose normal.      Mouth/Throat:      Mouth: Mucous membranes are moist.      Pharynx: Oropharynx is clear. No oropharyngeal exudate or posterior oropharyngeal erythema.   Eyes:      Extraocular Movements: Extraocular movements intact.      Pupils: Pupils are equal, round, and reactive to light.   Cardiovascular:      Rate and Rhythm: Normal rate and regular rhythm.      Pulses: Normal pulses.      Heart sounds: Normal heart sounds. No murmur heard.  Pulmonary:      Effort: Pulmonary effort is normal. No respiratory distress.      Breath sounds: Normal breath sounds. No wheezing or rhonchi.   Abdominal:      Palpations: Abdomen is soft.      Tenderness: There is no abdominal tenderness.   Musculoskeletal:         General: Normal range of motion.      Cervical back: Normal range of motion.   Lymphadenopathy:      Cervical: No cervical adenopathy.   Skin:     General: Skin is warm and dry.      Capillary Refill: Capillary refill takes less than 2 seconds.   Neurological:      General: No focal deficit present.      Mental Status: He is alert and oriented to person, place, and time.   Psychiatric:         Mood and Affect: Mood normal.         Behavior: Behavior normal.                   "

## 2025-02-17 NOTE — LETTER
February 17, 2025     Patient: Fredrick Zamudio   YOB: 1970   Date of Visit: 2/17/2025       To Whom It May Concern:    It is my medical opinion that Fredrick Zamudio may return to work on Thursday, 2/20/2025 .    If you have any questions or concerns, please don't hesitate to call.         Sincerely,        Lissy Calderón PA-C    CC: No Recipients

## 2025-02-24 ENCOUNTER — APPOINTMENT (EMERGENCY)
Dept: CT IMAGING | Facility: HOSPITAL | Age: 55
End: 2025-02-24
Payer: COMMERCIAL

## 2025-02-24 ENCOUNTER — ANESTHESIA (OUTPATIENT)
Dept: PERIOP | Facility: HOSPITAL | Age: 55
End: 2025-02-24
Payer: COMMERCIAL

## 2025-02-24 ENCOUNTER — ANESTHESIA EVENT (OUTPATIENT)
Dept: PERIOP | Facility: HOSPITAL | Age: 55
End: 2025-02-24
Payer: COMMERCIAL

## 2025-02-24 ENCOUNTER — HOSPITAL ENCOUNTER (OUTPATIENT)
Facility: HOSPITAL | Age: 55
Setting detail: OBSERVATION
Discharge: HOME | End: 2025-02-25
Attending: EMERGENCY MEDICINE | Admitting: HOSPITALIST
Payer: COMMERCIAL

## 2025-02-24 ENCOUNTER — APPOINTMENT (OUTPATIENT)
Dept: RADIOLOGY | Facility: HOSPITAL | Age: 55
End: 2025-02-24
Payer: COMMERCIAL

## 2025-02-24 DIAGNOSIS — N20.1 URETEROPELVIC JUNCTION CALCULUS: Primary | ICD-10-CM

## 2025-02-24 DIAGNOSIS — K43.5 PARASTOMAL HERNIA: ICD-10-CM

## 2025-02-24 DIAGNOSIS — N20.1 URETEROPELVIC JUNCTION CALCULUS: ICD-10-CM

## 2025-02-24 DIAGNOSIS — N17.9 ACUTE KIDNEY INJURY (NONTRAUMATIC) (HCC): ICD-10-CM

## 2025-02-24 DIAGNOSIS — N26.1 LEFT RENAL ATROPHY: ICD-10-CM

## 2025-02-24 DIAGNOSIS — N13.30 HYDROURETERONEPHROSIS: ICD-10-CM

## 2025-02-24 DIAGNOSIS — K80.20 CHOLELITHIASIS: ICD-10-CM

## 2025-02-24 PROBLEM — K51.90 ULCERATIVE COLITIS (HCC): Status: ACTIVE | Noted: 2025-02-24

## 2025-02-24 PROBLEM — M1A.09X0 CHRONIC GOUT OF MULTIPLE SITES: Status: ACTIVE | Noted: 2025-02-24

## 2025-02-24 LAB
ALBUMIN SERPL BCG-MCNC: 4.4 G/DL (ref 3.5–5)
ALP SERPL-CCNC: 62 U/L (ref 34–104)
ALT SERPL W P-5'-P-CCNC: 17 U/L (ref 7–52)
ANION GAP SERPL CALCULATED.3IONS-SCNC: 8 MMOL/L (ref 4–13)
APTT PPP: 29 SECONDS (ref 23–34)
AST SERPL W P-5'-P-CCNC: 19 U/L (ref 13–39)
BACTERIA UR QL AUTO: ABNORMAL /HPF
BACTERIA UR QL AUTO: ABNORMAL /HPF
BASOPHILS # BLD AUTO: 0.04 THOUSANDS/ÂΜL (ref 0–0.1)
BASOPHILS NFR BLD AUTO: 1 % (ref 0–1)
BILIRUB SERPL-MCNC: 0.43 MG/DL (ref 0.2–1)
BILIRUB UR QL STRIP: NEGATIVE
BILIRUB UR QL STRIP: NEGATIVE
BUN SERPL-MCNC: 15 MG/DL (ref 5–25)
CALCIUM SERPL-MCNC: 9.3 MG/DL (ref 8.4–10.2)
CHLORIDE SERPL-SCNC: 101 MMOL/L (ref 96–108)
CLARITY UR: ABNORMAL
CLARITY UR: CLEAR
CO2 SERPL-SCNC: 26 MMOL/L (ref 21–32)
COLOR UR: YELLOW
COLOR UR: YELLOW
CREAT SERPL-MCNC: 1.55 MG/DL (ref 0.6–1.3)
EOSINOPHIL # BLD AUTO: 0.16 THOUSAND/ÂΜL (ref 0–0.61)
EOSINOPHIL NFR BLD AUTO: 2 % (ref 0–6)
ERYTHROCYTE [DISTWIDTH] IN BLOOD BY AUTOMATED COUNT: 12.6 % (ref 11.6–15.1)
GFR SERPL CREATININE-BSD FRML MDRD: 49 ML/MIN/1.73SQ M
GLUCOSE SERPL-MCNC: 128 MG/DL (ref 65–140)
GLUCOSE UR STRIP-MCNC: NEGATIVE MG/DL
GLUCOSE UR STRIP-MCNC: NEGATIVE MG/DL
HCT VFR BLD AUTO: 47.9 % (ref 36.5–49.3)
HGB BLD-MCNC: 15.6 G/DL (ref 12–17)
HGB UR QL STRIP.AUTO: ABNORMAL
HGB UR QL STRIP.AUTO: ABNORMAL
IMM GRANULOCYTES # BLD AUTO: 0.05 THOUSAND/UL (ref 0–0.2)
IMM GRANULOCYTES NFR BLD AUTO: 1 % (ref 0–2)
INR PPP: 0.98 (ref 0.85–1.19)
KETONES UR STRIP-MCNC: NEGATIVE MG/DL
KETONES UR STRIP-MCNC: NEGATIVE MG/DL
LEUKOCYTE ESTERASE UR QL STRIP: NEGATIVE
LEUKOCYTE ESTERASE UR QL STRIP: NEGATIVE
LIPASE SERPL-CCNC: 32 U/L (ref 11–82)
LYMPHOCYTES # BLD AUTO: 0.97 THOUSANDS/ÂΜL (ref 0.6–4.47)
LYMPHOCYTES NFR BLD AUTO: 12 % (ref 14–44)
MCH RBC QN AUTO: 28.7 PG (ref 26.8–34.3)
MCHC RBC AUTO-ENTMCNC: 32.6 G/DL (ref 31.4–37.4)
MCV RBC AUTO: 88 FL (ref 82–98)
MONOCYTES # BLD AUTO: 0.38 THOUSAND/ÂΜL (ref 0.17–1.22)
MONOCYTES NFR BLD AUTO: 5 % (ref 4–12)
MUCOUS THREADS UR QL AUTO: ABNORMAL
NEUTROPHILS # BLD AUTO: 6.63 THOUSANDS/ÂΜL (ref 1.85–7.62)
NEUTS SEG NFR BLD AUTO: 79 % (ref 43–75)
NITRITE UR QL STRIP: NEGATIVE
NITRITE UR QL STRIP: NEGATIVE
NON-SQ EPI CELLS URNS QL MICRO: ABNORMAL /HPF
NON-SQ EPI CELLS URNS QL MICRO: ABNORMAL /HPF
NRBC BLD AUTO-RTO: 0 /100 WBCS
PH UR STRIP.AUTO: 5.5 [PH]
PH UR STRIP.AUTO: 6 [PH]
PLATELET # BLD AUTO: 306 THOUSANDS/UL (ref 149–390)
PMV BLD AUTO: 9.4 FL (ref 8.9–12.7)
POTASSIUM SERPL-SCNC: 4.5 MMOL/L (ref 3.5–5.3)
PROT SERPL-MCNC: 8 G/DL (ref 6.4–8.4)
PROT UR STRIP-MCNC: NEGATIVE MG/DL
PROT UR STRIP-MCNC: NEGATIVE MG/DL
PROTHROMBIN TIME: 13.5 SECONDS (ref 12.3–15)
RBC # BLD AUTO: 5.43 MILLION/UL (ref 3.88–5.62)
RBC #/AREA URNS AUTO: ABNORMAL /HPF
RBC #/AREA URNS AUTO: ABNORMAL /HPF
SODIUM SERPL-SCNC: 135 MMOL/L (ref 135–147)
SP GR UR STRIP.AUTO: 1.01
SP GR UR STRIP.AUTO: 1.02
UROBILINOGEN UR QL STRIP.AUTO: 0.2 E.U./DL
UROBILINOGEN UR QL STRIP.AUTO: 0.2 E.U./DL
WBC # BLD AUTO: 8.23 THOUSAND/UL (ref 4.31–10.16)
WBC #/AREA URNS AUTO: ABNORMAL /HPF
WBC #/AREA URNS AUTO: ABNORMAL /HPF

## 2025-02-24 PROCEDURE — 99223 1ST HOSP IP/OBS HIGH 75: CPT | Performed by: HOSPITALIST

## 2025-02-24 PROCEDURE — 99291 CRITICAL CARE FIRST HOUR: CPT

## 2025-02-24 PROCEDURE — C1769 GUIDE WIRE: HCPCS | Performed by: UROLOGY

## 2025-02-24 PROCEDURE — 81003 URINALYSIS AUTO W/O SCOPE: CPT

## 2025-02-24 PROCEDURE — 96376 TX/PRO/DX INJ SAME DRUG ADON: CPT

## 2025-02-24 PROCEDURE — 85730 THROMBOPLASTIN TIME PARTIAL: CPT

## 2025-02-24 PROCEDURE — 85025 COMPLETE CBC W/AUTO DIFF WBC: CPT

## 2025-02-24 PROCEDURE — 87086 URINE CULTURE/COLONY COUNT: CPT | Performed by: UROLOGY

## 2025-02-24 PROCEDURE — 36415 COLL VENOUS BLD VENIPUNCTURE: CPT

## 2025-02-24 PROCEDURE — 96374 THER/PROPH/DIAG INJ IV PUSH: CPT

## 2025-02-24 PROCEDURE — C1758 CATHETER, URETERAL: HCPCS | Performed by: UROLOGY

## 2025-02-24 PROCEDURE — 83690 ASSAY OF LIPASE: CPT

## 2025-02-24 PROCEDURE — 81003 URINALYSIS AUTO W/O SCOPE: CPT | Performed by: UROLOGY

## 2025-02-24 PROCEDURE — 81001 URINALYSIS AUTO W/SCOPE: CPT | Performed by: UROLOGY

## 2025-02-24 PROCEDURE — 99284 EMERGENCY DEPT VISIT MOD MDM: CPT

## 2025-02-24 PROCEDURE — 81001 URINALYSIS AUTO W/SCOPE: CPT

## 2025-02-24 PROCEDURE — 74420 UROGRAPHY RTRGR +-KUB: CPT

## 2025-02-24 PROCEDURE — 74176 CT ABD & PELVIS W/O CONTRAST: CPT

## 2025-02-24 PROCEDURE — 80053 COMPREHEN METABOLIC PANEL: CPT

## 2025-02-24 PROCEDURE — 85610 PROTHROMBIN TIME: CPT

## 2025-02-24 PROCEDURE — 93005 ELECTROCARDIOGRAM TRACING: CPT

## 2025-02-24 PROCEDURE — C2625 STENT, NON-COR, TEM W/DEL SY: HCPCS | Performed by: UROLOGY

## 2025-02-24 PROCEDURE — 96361 HYDRATE IV INFUSION ADD-ON: CPT

## 2025-02-24 DEVICE — INLAY OPTIMA URETERAL STENT W/O GUIDEWIRE
Type: IMPLANTABLE DEVICE | Status: FUNCTIONAL
Brand: BARD® INLAY OPTIMA® URETERAL STENT

## 2025-02-24 RX ORDER — HYDROMORPHONE HCL/PF 1 MG/ML
0.5 SYRINGE (ML) INJECTION EVERY 4 HOURS PRN
Status: DISCONTINUED | OUTPATIENT
Start: 2025-02-24 | End: 2025-02-25 | Stop reason: HOSPADM

## 2025-02-24 RX ORDER — DOCUSATE SODIUM 100 MG/1
100 CAPSULE, LIQUID FILLED ORAL 2 TIMES DAILY
Status: DISCONTINUED | OUTPATIENT
Start: 2025-02-24 | End: 2025-02-25 | Stop reason: HOSPADM

## 2025-02-24 RX ORDER — HYDROMORPHONE HCL/PF 1 MG/ML
1 SYRINGE (ML) INJECTION ONCE
Status: COMPLETED | OUTPATIENT
Start: 2025-02-24 | End: 2025-02-24

## 2025-02-24 RX ORDER — CEFTRIAXONE 2 G/50ML
2000 INJECTION, SOLUTION INTRAVENOUS ONCE
Status: COMPLETED | OUTPATIENT
Start: 2025-02-24 | End: 2025-02-24

## 2025-02-24 RX ORDER — HYDROMORPHONE HCL/PF 1 MG/ML
0.5 SYRINGE (ML) INJECTION ONCE
Refills: 0 | Status: COMPLETED | OUTPATIENT
Start: 2025-02-24 | End: 2025-02-24

## 2025-02-24 RX ORDER — ONDANSETRON 2 MG/ML
4 INJECTION INTRAMUSCULAR; INTRAVENOUS ONCE AS NEEDED
Status: DISCONTINUED | OUTPATIENT
Start: 2025-02-24 | End: 2025-02-24 | Stop reason: HOSPADM

## 2025-02-24 RX ORDER — TAMSULOSIN HYDROCHLORIDE 0.4 MG/1
0.4 CAPSULE ORAL
Status: DISCONTINUED | OUTPATIENT
Start: 2025-02-24 | End: 2025-02-25 | Stop reason: HOSPADM

## 2025-02-24 RX ORDER — FENTANYL CITRATE 50 UG/ML
INJECTION, SOLUTION INTRAMUSCULAR; INTRAVENOUS CONTINUOUS PRN
Status: DISCONTINUED | OUTPATIENT
Start: 2025-02-24 | End: 2025-02-24

## 2025-02-24 RX ORDER — HEPARIN SODIUM 5000 [USP'U]/ML
5000 INJECTION, SOLUTION INTRAVENOUS; SUBCUTANEOUS EVERY 8 HOURS SCHEDULED
Status: DISCONTINUED | OUTPATIENT
Start: 2025-02-24 | End: 2025-02-25 | Stop reason: HOSPADM

## 2025-02-24 RX ORDER — ONDANSETRON 2 MG/ML
4 INJECTION INTRAMUSCULAR; INTRAVENOUS EVERY 6 HOURS PRN
Status: DISCONTINUED | OUTPATIENT
Start: 2025-02-24 | End: 2025-02-25 | Stop reason: HOSPADM

## 2025-02-24 RX ORDER — ALLOPURINOL 100 MG/1
100 TABLET ORAL DAILY
Status: DISCONTINUED | OUTPATIENT
Start: 2025-02-24 | End: 2025-02-25 | Stop reason: HOSPADM

## 2025-02-24 RX ORDER — HYDROMORPHONE HCL/PF 1 MG/ML
0.5 SYRINGE (ML) INJECTION
Status: DISCONTINUED | OUTPATIENT
Start: 2025-02-24 | End: 2025-02-24 | Stop reason: HOSPADM

## 2025-02-24 RX ORDER — SODIUM CHLORIDE, SODIUM GLUCONATE, SODIUM ACETATE, POTASSIUM CHLORIDE, MAGNESIUM CHLORIDE, SODIUM PHOSPHATE, DIBASIC, AND POTASSIUM PHOSPHATE .53; .5; .37; .037; .03; .012; .00082 G/100ML; G/100ML; G/100ML; G/100ML; G/100ML; G/100ML; G/100ML
125 INJECTION, SOLUTION INTRAVENOUS CONTINUOUS
Status: DISCONTINUED | OUTPATIENT
Start: 2025-02-24 | End: 2025-02-25 | Stop reason: HOSPADM

## 2025-02-24 RX ORDER — PROPOFOL 10 MG/ML
INJECTION, EMULSION INTRAVENOUS AS NEEDED
Status: DISCONTINUED | OUTPATIENT
Start: 2025-02-24 | End: 2025-02-24

## 2025-02-24 RX ORDER — ACETAMINOPHEN 325 MG/1
650 TABLET ORAL EVERY 6 HOURS PRN
Status: DISCONTINUED | OUTPATIENT
Start: 2025-02-24 | End: 2025-02-25 | Stop reason: HOSPADM

## 2025-02-24 RX ORDER — SODIUM CHLORIDE, SODIUM LACTATE, POTASSIUM CHLORIDE, CALCIUM CHLORIDE 600; 310; 30; 20 MG/100ML; MG/100ML; MG/100ML; MG/100ML
125 INJECTION, SOLUTION INTRAVENOUS CONTINUOUS
Status: DISCONTINUED | OUTPATIENT
Start: 2025-02-24 | End: 2025-02-24

## 2025-02-24 RX ORDER — LIDOCAINE HYDROCHLORIDE 20 MG/ML
INJECTION, SOLUTION EPIDURAL; INFILTRATION; INTRACAUDAL; PERINEURAL AS NEEDED
Status: DISCONTINUED | OUTPATIENT
Start: 2025-02-24 | End: 2025-02-24

## 2025-02-24 RX ORDER — PROMETHAZINE HYDROCHLORIDE 25 MG/ML
6.25 INJECTION, SOLUTION INTRAMUSCULAR; INTRAVENOUS
Status: DISCONTINUED | OUTPATIENT
Start: 2025-02-24 | End: 2025-02-24 | Stop reason: HOSPADM

## 2025-02-24 RX ORDER — MIDAZOLAM HYDROCHLORIDE 2 MG/2ML
INJECTION, SOLUTION INTRAMUSCULAR; INTRAVENOUS AS NEEDED
Status: DISCONTINUED | OUTPATIENT
Start: 2025-02-24 | End: 2025-02-24

## 2025-02-24 RX ORDER — FENTANYL CITRATE/PF 50 MCG/ML
25 SYRINGE (ML) INJECTION
Status: DISCONTINUED | OUTPATIENT
Start: 2025-02-24 | End: 2025-02-24 | Stop reason: HOSPADM

## 2025-02-24 RX ORDER — OXYCODONE AND ACETAMINOPHEN 5; 325 MG/1; MG/1
1 TABLET ORAL EVERY 4 HOURS PRN
Refills: 0 | Status: DISCONTINUED | OUTPATIENT
Start: 2025-02-24 | End: 2025-02-25 | Stop reason: HOSPADM

## 2025-02-24 RX ORDER — ONDANSETRON 2 MG/ML
INJECTION INTRAMUSCULAR; INTRAVENOUS AS NEEDED
Status: DISCONTINUED | OUTPATIENT
Start: 2025-02-24 | End: 2025-02-24

## 2025-02-24 RX ORDER — MAGNESIUM HYDROXIDE 1200 MG/15ML
LIQUID ORAL AS NEEDED
Status: DISCONTINUED | OUTPATIENT
Start: 2025-02-24 | End: 2025-02-24 | Stop reason: HOSPADM

## 2025-02-24 RX ADMIN — HEPARIN SODIUM 5000 UNITS: 5000 INJECTION INTRAVENOUS; SUBCUTANEOUS at 17:26

## 2025-02-24 RX ADMIN — HYDROMORPHONE HYDROCHLORIDE 0.5 MG: 1 INJECTION, SOLUTION INTRAMUSCULAR; INTRAVENOUS; SUBCUTANEOUS at 08:34

## 2025-02-24 RX ADMIN — SODIUM CHLORIDE, SODIUM LACTATE, POTASSIUM CHLORIDE, AND CALCIUM CHLORIDE 125 ML/HR: .6; .31; .03; .02 INJECTION, SOLUTION INTRAVENOUS at 11:26

## 2025-02-24 RX ADMIN — MIDAZOLAM 2 MG: 1 INJECTION INTRAMUSCULAR; INTRAVENOUS at 12:30

## 2025-02-24 RX ADMIN — TAMSULOSIN HYDROCHLORIDE 0.4 MG: 0.4 CAPSULE ORAL at 17:25

## 2025-02-24 RX ADMIN — SODIUM CHLORIDE, SODIUM GLUCONATE, SODIUM ACETATE, POTASSIUM CHLORIDE, MAGNESIUM CHLORIDE, SODIUM PHOSPHATE, DIBASIC, AND POTASSIUM PHOSPHATE 125 ML/HR: .53; .5; .37; .037; .03; .012; .00082 INJECTION, SOLUTION INTRAVENOUS at 17:25

## 2025-02-24 RX ADMIN — CEFTRIAXONE 2000 MG: 2 INJECTION, SOLUTION INTRAVENOUS at 10:43

## 2025-02-24 RX ADMIN — SODIUM CHLORIDE 1000 ML: 0.9 INJECTION, SOLUTION INTRAVENOUS at 08:34

## 2025-02-24 RX ADMIN — LIDOCAINE HYDROCHLORIDE 100 MG: 20 INJECTION, SOLUTION EPIDURAL; INFILTRATION; INTRACAUDAL at 12:36

## 2025-02-24 RX ADMIN — ALLOPURINOL 100 MG: 100 TABLET ORAL at 17:25

## 2025-02-24 RX ADMIN — PROPOFOL 200 MG: 10 INJECTION, EMULSION INTRAVENOUS at 12:37

## 2025-02-24 RX ADMIN — HYDROMORPHONE HYDROCHLORIDE 1 MG: 1 INJECTION, SOLUTION INTRAMUSCULAR; INTRAVENOUS; SUBCUTANEOUS at 10:14

## 2025-02-24 RX ADMIN — ONDANSETRON 4 MG: 2 INJECTION INTRAMUSCULAR; INTRAVENOUS at 12:31

## 2025-02-24 RX ADMIN — SODIUM CHLORIDE 1000 ML: 0.9 INJECTION, SOLUTION INTRAVENOUS at 10:46

## 2025-02-24 NOTE — ASSESSMENT & PLAN NOTE
Admit to [facility] / [facility] observation  Acute kidney injury is present on admission-1.55  Secondary to postobstructive etiology  Initial CT abdomen pelvis with the following results:- Right ureteropelvic junction 3 mm calculus resulting in mild right hydroureteronephrosis.   Status post urology evaluation  UA results reviewed-no evidence of UTI  Patient is currently postprocedure day 0-a right cystoscopy and stent placement  Continue gentle IV fluid support  Will use Tylenol for mild, Percocet for moderate, and IV morphine for severe pain respectively  Reevaluate renal function in the a.m. with repeat BMP testing  Hopeful discharge planning in 24 to 48 hours pending renal function resolution

## 2025-02-24 NOTE — ASSESSMENT & PLAN NOTE
Continue CPAP at at bedtime  Patient also has probable underlying morbid obesity hypoventilation syndrome

## 2025-02-24 NOTE — ANESTHESIA PREPROCEDURE EVALUATION
Procedure:  CYSTOSCOPY RETROGRADE PYELOGRAM WITH INSERTION STENT URETERAL (Right: Bladder)    Relevant Problems   /RENAL   (+) Acute kidney injury (nontraumatic) ([facility])   (+) Hydronephrosis with urterocalculus   (+) Stage 3a chronic kidney disease ([facility])      Other   (+) CPAP (continuous positive airway pressure) dependence   (+) Morbid obesity with BMI of 40.0-44.9, adult ([facility])        Physical Exam    Airway    Mallampati score: II         Dental       Cardiovascular      Pulmonary      Other Findings        Anesthesia Plan  ASA Score- 3 Emergent    Anesthesia Type- general with ASA Monitors.         Additional Monitors:     Airway Plan: LMA.           Plan Factors-    Chart reviewed.                      Induction- intravenous.    Postoperative Plan-         Informed Consent- Anesthetic plan and risks discussed with patient.  I personally reviewed this patient with the CRNA. Discussed and agreed on the Anesthesia Plan with the CRNA..      NPO Status:  No vitals data found for the desired time range.

## 2025-02-24 NOTE — PLAN OF CARE
Problem: PAIN - ADULT  Goal: Verbalizes/displays adequate comfort level or baseline comfort level  Description: Interventions:  - Encourage patient to monitor pain and request assistance  - Assess pain using appropriate pain scale  - Administer analgesics based on type and severity of pain and evaluate response  - Implement non-pharmacological measures as appropriate and evaluate response  - Consider cultural and social influences on pain and pain management  - Notify physician/advanced practitioner if interventions unsuccessful or patient reports new pain  Outcome: Progressing     Problem: INFECTION - ADULT  Goal: Absence or prevention of progression during hospitalization  Description: INTERVENTIONS:  - Assess and monitor for signs and symptoms of infection  - Monitor lab/diagnostic results  - Monitor all insertion sites, i.e. indwelling lines, tubes, and drains  - Monitor endotracheal if appropriate and nasal secretions for changes in amount and color  - Martell appropriate cooling/warming therapies per order  - Administer medications as ordered  - Instruct and encourage patient and family to use good hand hygiene technique  - Identify and instruct in appropriate isolation precautions for identified infection/condition  Outcome: Progressing     Problem: GENITOURINARY - ADULT  Goal: Maintains or returns to baseline urinary function  Description: INTERVENTIONS:  - Assess urinary function  - Encourage oral fluids to ensure adequate hydration if ordered  - Administer IV fluids as ordered to ensure adequate hydration  - Administer ordered medications as needed  - Offer frequent toileting  - Follow urinary retention protocol if ordered  Outcome: Progressing

## 2025-02-24 NOTE — LETTER
Critical access hospital [facility]  [address] [place] [address]  Dept: [phone]    February 25, 2025     Patient: Fredrick Zamudio   YOB: 1970   Date of Visit: 2/24/2025       To Whom it May Concern:    Fredrick Zamudio is under my professional care. He was seen in the hospital from 2/24/2025 to 02/25/25. He may return to work on 2/26/2025 without limitations.    If you have any questions or concerns, please don't hesitate to call.         Sincerely,          Long Reyna MD

## 2025-02-24 NOTE — ASSESSMENT & PLAN NOTE
Assessment:  Right renal colic secondary to a 5 mm proximal right ureteral stone with mild JENAE in this patient with known urolithiasis and poorly functioning left kidney.    Plan:  Options, procedures, benefits and risks were discussed with the patient and his wife and they agree to the planned procedure which will include cystoscopy with right retrograde urogram and insertion of right ureteral stent.  They understand that the stone will not be removed at this time and will require additional procedures as an outpatient.  We will follow his renal function after stent insertion.  Ceftriaxone was given in the ED.

## 2025-02-24 NOTE — OP NOTE
OPERATIVE REPORT  PATIENT NAME: Fredrick Zamudio    :  1970  MRN: [id number]  Pt Location: [facility] ROOM 02    SURGERY DATE: 2025    Surgeons and Role:     * Gualberto Mosley MD - Primary    Preop Diagnosis:  Ureteropelvic junction calculus [N20.1]  Left renal atrophy [N26.1]  Acute kidney injury (nontraumatic) (HCC) [N17.9]    Post-Op Diagnosis Codes:     * Ureteropelvic junction calculus [N20.1]     * Left renal atrophy [N26.1]     * Acute kidney injury (nontraumatic) (HCC) [N17.9]    Procedure(s):  Right - CYSTOSCOPY RETROGRADE PYELOGRAM WITH INSERTION STENT URETERAL    Specimen(s):  ID Type Source Tests Collected by Time Destination   A :  Urine Urine, Other URINE CULTURE, URINALYSIS WITH REFLEX TO SCOPE Gualberto Mosley MD 2025 1249        Estimated Blood Loss:   Minimal    Drains:  Colostomy RLQ (Active)   Number of days:        Ureteral Internal Stent Right ureter 6 Fr. (Active)   Number of days: 0       Anesthesia Type:   General    Operative Indications:  Ureteropelvic junction calculus [N20.1]  Left renal atrophy [N26.1]  Acute kidney injury (nontraumatic) (HCC) [N17.9]  This is a 54-year-old male with a history of uric acid stones presenting with obstructing 5 mm right proximal ureteral/UPJ stone with JENAE.  He has a functionally near solitary right kidney due to left renal atrophy with chronic nonobstructing stones.    Operative Findings:  Right retrograde urogram showed normal course and caliber of the ureter with no definite filling defect but there was hold-up of opacification of the proximal ureter with eventual complete filling into the collecting system which had mild hydronephrosis.      Complications:   None    Procedure and Technique:  The patient was properly identified in the operating room and after adequate general anesthesia was placed in the lithotomy position.  The lower abdomen and genitalia were prepped and draped in usual fashion.  Cystourethroscopy was performed with a 21  Indonesian cystoscope using 30 degree and 70 degree lenses.  The anterior urethra was normal.  The prostate was minimally enlarged.  The bladder was entered and urine was drained and sent for urinalysis and culture.  The bladder was smooth with no masses.  There was fine yellow sand on the posterior wall.  The ureteral orifices were normal bilaterally.  With the aid of a 5 Indonesian open-ended catheter, a Solo guidewire was passed into the left ureteral orifice.  The wire was removed.  Contrast material was instilled under fluoroscopic guidance with findings as above.  The catheter followed into the collecting system.  The wire was placed into the collecting system and the ureteral length was measured.  The catheter was removed.  A 6 Indonesian 28 cm Bard inlay [place] stent was passed over the wire and the wire was removed leaving a good curl of stent within the renal pelvis and within the bladder.  Bladder was emptied and the cystoscope was removed.  The patient tolerated the procedure well and left the operating room in good condition.   I was present for the entire procedure.    Patient Disposition:  PACU              SIGNATURE: Gualberto Mosley MD  DATE: February 24, 2025  TIME: 1:13 PM

## 2025-02-24 NOTE — H&P
H&P - Hospitalist   Name: Fredrick Zamudio 54 y.o. male I MRN: [id number]  Unit/Bed#: -01 I Date of Admission: 2/24/2025   Date of Service: 2/24/2025 I Hospital Day: 0     Assessment & Plan  Hydronephrosis with urterocalculus  Management as outlined above  Continue Flomax  Acute kidney injury (nontraumatic) (HCC)  Admit to [facility] observation  Acute kidney injury is present on admission-1.55  Secondary to postobstructive etiology  Initial CT abdomen pelvis with the following results:- Right ureteropelvic junction 3 mm calculus resulting in mild right hydroureteronephrosis.   Status post urology evaluation  UA results reviewed-no evidence of UTI  Patient is currently postprocedure day 0-a right cystoscopy and stent placement  Continue gentle IV fluid support  Will use Tylenol for mild, Percocet for moderate, and IV morphine for severe pain respectively  Reevaluate renal function in the a.m. with repeat BMP testing  Hopeful discharge planning in 24 to 48 hours pending renal function resolution  CPAP (continuous positive airway pressure) dependence  Continue CPAP at at bedtime  Patient also has probable underlying morbid obesity hypoventilation syndrome  Morbid obesity with BMI of 40.0-44.9, adult ([facility])  Actual BMI of 44.25  Dietary, weight loss, and lifestyle modification counseling was provided  Ulcerative colitis ([facility])  Status post a colectomy many years ago  On no meds  Colostomy in place  Chronic gout of multiple sites  Continue home dosing of allopurinol      VTE Pharmacologic Prophylaxis: VTE Score: 6 High Risk (Score >/= 5) - Pharmacological DVT Prophylaxis Ordered: heparin. Sequential Compression Devices Ordered.  Code Status: Level 1 - Full Code reviewed with the patient  Discussion with family: Patient declined call to .     Anticipated Length of Stay: Patient will be admitted on an observation basis with an anticipated length of stay of less than 2 midnights secondary to management of an  acute kidney injury.    History of Present Illness   Chief Complaint: Right flank pain x 1 day    Fredrick Zamudio is a 54 y.o. male with a PMH of the medical conditions as outlined below who presents with the chief complaint as outlined above.  The patient reports that he was in his usual state of health up until approximately 6 AM this morning.    Patient reports pain prior to coming into the hospital with the following characteristics:    Location: Right flank pain  Intensity: 3 out of 10  Quality: Dull/throbbing  Onset: Acute  Radiation: None  Aggravating Factors: No clear-cut aggravating factors  Alleviating Factors: Heated seats in his car  Associated Symptoms: Denies any nausea, vomiting, diarrhea, fevers, chills, palpitations, shortness of breath, numbness, tingling and or weakness otherwise    Initially, the patient thought that the pain would be self-limited and that it might be related to his history of ulcerative colitis and having a colostomy bag in place, however due to the pain being persistent and throbbing he decided to seek medical attention.  In the emergency room, he was diagnosed with an acute kidney injury secondary to right-sided nephroureterolithiasis, and then was subsequently recommended for admission.  Prior to the patient being admitted to the hospitalist service, he was seen in conjunction with urology who were able to take him to the OR and were able to complete a cystoscopy, and right sided ureteral stent placement.    Review of Systems   Genitourinary:  Positive for flank pain.   All other systems reviewed and are negative.      Historical Information   Past Medical History:   Diagnosis Date    Chronic kidney disease     CPAP (continuous positive airway pressure) dependence     History of transfusion     Hyperlipidemia     Ileus (HCC) 04/01/2022    Irregular heart beat     Kidney stone     Sleep apnea     Ulcerative colitis (HCC)      Past Surgical History:   Procedure Laterality Date     COLONOSCOPY      COLOSTOMY  2005    CYSTOSCOPY W/ LASER LITHOTRIPSY Right 6/10/2024    Procedure: CYSTOSCOPY; RETROGRADE; URETEROSCOPY; LASER LITHOTRIPSY; STONE BASKETING; STENT EXCHANGE;  Surgeon: Gualberto Mosley MD;  Location: [facility];  Service: Urology    FL RETROGRADE PYELOGRAM  04/22/2024    FL RETROGRADE PYELOGRAM  6/10/2024    ILEOSTOMY Right 2007    MULTIPLE TOOTH EXTRACTIONS      [place] CYSTOURETHROSCOPY W/URETERAL CATHETERIZATION Right 04/22/2024    Procedure: CYSTOSCOPY RETROGRADE PYELOGRAM WITH INSERTION STENT URETERAL;  Surgeon: Gualberto Mosley MD;  Location: [facility];  Service: Urology    [place] CYSTOURETHROSCOPY W/URETERAL CATHETERIZATION Right 2/24/2025    Procedure: CYSTOSCOPY RETROGRADE PYELOGRAM WITH INSERTION STENT URETERAL;  Surgeon: Gualberto Mosley MD;  Location: [facility];  Service: Urology     Social History     Tobacco Use    Smoking status: Never    Smokeless tobacco: Never   Vaping Use    Vaping status: Never Used   Substance and Sexual Activity    Alcohol use: Yes     Comment: social    Drug use: Never    Sexual activity: Yes     Partners: Female     E-Cigarette/Vaping    E-Cigarette Use Never User      E-Cigarette/Vaping Substances    Nicotine No     THC No     CBD No     Flavoring No     Other No     Unknown No      Family History   Problem Relation Age of Onset    Alcohol abuse Mother     Alzheimer's disease Father     Cancer Sister      Social History:  Marital Status: /Civil Union   Occupation: Works as a  at the [employer]  Patient Pre-hospital Living Situation: Home  Patient Pre-hospital Level of Mobility: walks  Patient Pre-hospital Diet Restrictions: None    Meds/Allergies   I have reviewed home medications with patient personally.  Prior to Admission medications    Medication Sig Start Date End Date Taking? Authorizing Provider   allopurinol (ZYLOPRIM) 100 mg tablet Take 1 tablet (100 mg total) by mouth daily 1/8/25  Yes Luiza Rashid,  DO   brompheniramine-pseudoephedrine-DM 30-2-10 MG/5ML syrup Take 5 mL by mouth 4 (four) times a day as needed for cough or congestion 2/17/25  Yes Lissy Calderón PA-C   ergocalciferol (Drisdol) 1.25 MG (93830 UT) capsule Take 1 capsule (50,000 Units total) by mouth once a week 1/8/25  Yes Luiza Rashid,    potassium citrate (UROCIT-K) 10 mEq Take 1 tablet (10 mEq total) by mouth 3 (three) times a day with meals 7/1/24  Yes ANITA Lim   tamsulosin (FLOMAX) 0.4 mg Take 1 capsule (0.4 mg total) by mouth daily with dinner  Patient not taking: Reported on 1/8/2025 4/24/24   Rebeka Jean MD     Allergies   Allergen Reactions    Lactose - Food Allergy Other (See Comments)     lactose intolerance       Objective :  Temp:  [98 °F (36.7 °C)-98.3 °F (36.8 °C)] 98.1 °F (36.7 °C)  HR:  [63-83] 81  BP: (141-183)/(80-94) 141/86  Resp:  [16-19] 19  SpO2:  [95 %-100 %] 96 %  O2 Device: None (Room air)    Physical Exam  Vitals and nursing note reviewed.   Constitutional:       General: He is not in acute distress.     Appearance: Normal appearance. He is not ill-appearing.   HENT:      Head: Normocephalic and atraumatic.      Nose: Nose normal.   Eyes:      Extraocular Movements: Extraocular movements intact.      Pupils: Pupils are equal, round, and reactive to light.   Cardiovascular:      Rate and Rhythm: Normal rate and regular rhythm.      Pulses: Normal pulses.      Heart sounds: Normal heart sounds. No murmur heard.     No friction rub. No gallop.   Pulmonary:      Effort: Pulmonary effort is normal.      Breath sounds: Normal breath sounds.   Abdominal:      General: There is no distension.      Palpations: Abdomen is soft. There is no mass.      Tenderness: There is no abdominal tenderness. There is no guarding or rebound.      Comments: Colostomy bag in place on the right upper abdomen   Musculoskeletal:         General: No swelling or tenderness. Normal range of motion.      Cervical back: Normal  range of motion and neck supple. No rigidity. No muscular tenderness.      Right lower leg: No edema.      Left lower leg: No edema.   Skin:     General: Skin is warm.      Capillary Refill: Capillary refill takes less than 2 seconds.      Findings: No erythema or rash.   Neurological:      General: No focal deficit present.      Mental Status: He is alert and oriented to person, place, and time. Mental status is at baseline.   Psychiatric:         Mood and Affect: Mood normal.         Behavior: Behavior normal.          Lines/Drains:  Lines/Drains/Airways       Active Status       Name Placement date Placement time Site Days    Ureteral Internal Stent Right ureter 6 Fr. 02/24/25  1258  Right ureter  less than 1                          Lab Results: I have reviewed the following results:  Results from last 7 days   Lab Units 02/24/25  0825   WBC Thousand/uL 8.23   HEMOGLOBIN g/dL 15.6   HEMATOCRIT % 47.9   PLATELETS Thousands/uL 306   SEGS PCT % 79*   LYMPHO PCT % 12*   MONO PCT % 5   EOS PCT % 2     Results from last 7 days   Lab Units 02/24/25  0825   SODIUM mmol/L 135   POTASSIUM mmol/L 4.5   CHLORIDE mmol/L 101   CO2 mmol/L 26   BUN mg/dL 15   CREATININE mg/dL 1.55*   ANION GAP mmol/L 8   CALCIUM mg/dL 9.3   ALBUMIN g/dL 4.4   TOTAL BILIRUBIN mg/dL 0.43   ALK PHOS U/L 62   ALT U/L 17   AST U/L 19   GLUCOSE RANDOM mg/dL 128     Results from last 7 days   Lab Units 02/24/25  0825   INR  0.98         Lab Results   Component Value Date    HGBA1C 5.7 (H) 06/27/2023    HGBA1C 5.9 (H) 05/05/2023    HGBA1C 5.9 05/05/2023           Imaging Results Review: I reviewed radiology reports from this admission including: CT abdomen/pelvis.  Other Study Results Review: No additional pertinent studies reviewed.    Administrative Statements       ** Please Note: This note has been constructed using a voice recognition system. **

## 2025-02-24 NOTE — ED PROVIDER NOTES
Time reflects when diagnosis was documented in both MDM as applicable and the Disposition within this note       Time User Action Codes Description Comment    2/24/2025  9:12 AM Kurt White Add [N20.1] Ureteropelvic junction calculus     2/24/2025  9:12 AM Kurt White Modify [N20.1] Right ureteropelvic junction calculus     2/24/2025  9:13 AM Kurt White Add [N13.30] Hydroureteronephrosis     2/24/2025  9:13 AM Kurt White Modify [N13.30] Right hydroureteronephrosis     2/24/2025  9:13 AM Kurt White Add [N26.1] Left renal atrophy     2/24/2025  9:13 AM Kurt White Add [K80.20] Cholelithiasis     2/24/2025 10:16 AM Gualberto Mosley Add [N17.9] Acute kidney injury (nontraumatic) (HCC)     2/24/2025 10:38 AM Kurt White Add [K43.5] Parastomal hernia           ED Disposition       ED Disposition   Admit    Condition   Stable    Date/Time   Mon Feb 24, 2025 10:42 AM    Comment   Case was discussed with Dr. Reyna and the patient's admission status was agreed to be Admission Status: observation status to the service of Dr. Reyna.               Assessment & Plan       Medical Decision Making  Patient is a well-appearing 54-year-old male presenting with right flank pain. Patient woke up this morning around 5:30 AM feeling anxious and started with right flank pain around 6 AM that has been gradually getting worse. It feels like his prior kidney/ureteral stones. No CVA or abdominal tenderness to exam.   Plan is to obtain abdominal labs including CBC, CMP, and lipase and CT renal stone study to evaluate for ureterolithiasis, hydronephrosis, or other acute abdominal pathology. Will obtain coags in case he would need a procedure. Last cystoscopy June 2024. UA to evaluate for UTI/hematuria.  See ED course for interpretation of labs, imaging, and further medical decision making.   IV fluids for hydration. Will give Dilaudid for pain. Repeated Dilaudid for pain control with good relief. Ran the case  past urologist Dr. Mosley who will take the patient to the operating room later this morning for a stent. He has a UPJ stone on the side of his well functioning kidney (right). Will give Rocephin and obtain ECG for preop planning. Will admit to im.  Critical Care Time Statement: Upon my evaluation, this patient had a high probability of imminent or life-threatening deterioration due to ureteral stone with hydroureteronephrosis and preop planning, which required my direct attention, intervention, and personal management. I spent a total of 30 minutes directly providing critical care services, including interpretation of complex medical databases, evaluating for the presence of life-threatening injuries or illnesses, management of organ system failure(s) , complex medical decision making (to support/prevent further life-threatening deterioration)., interpretation of hemodynamic data, and titration of continuous IV medications (drips). This time is exclusive of procedures, teaching, treating other patients, family meetings, and any prior time recorded by providers other than myself.     Dispo: Patient hospitalized to [facility] under the care of Dr. Reyna for further workup and management.    Amount and/or Complexity of Data Reviewed  External Data Reviewed: labs, radiology and notes.  Labs: ordered. Decision-making details documented in ED Course.  Radiology: ordered. Decision-making details documented in ED Course.  ECG/medicine tests: ordered and independent interpretation performed.    Risk  Prescription drug management.  Decision regarding hospitalization.        ED Course as of 02/24/25 1101   Mon Feb 24, 2025   0822 Blood Pressure(!): 183/92  Elevated BP. Other vital signs reviewed and within normal limits.   0832 WBC: 8.23  No leukocytosis.   0832 Hemoglobin: 15.6  No anemia.   0835 Blood, UA(!): 3+   0843 RBC Urine(!): 20-30   0848 Creatinine(!): 1.55  Mild bump in creatinine. Hydrating with IVF. Patient is  at CT.   0848 LIPASE: 32   0908 Went over CT results with patient. Will reach out to his urologist Dr. Mosley who is on-call.   0937 Dr. Mosley is in the OR and will try to get back to me soon.   0946 Patient with continued pain. Will give another dose of Dilaudid.   1014 Dr. 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 Will confirm n.p.o. status.    1027 Patient's last p.o. was chili around 6 PM. Will reach out to [facility]. Dr. Mosley will take him to the OR later this morning. Will give Rocephin and obtain ECG preop.       Medications   cefTRIAXone (ROCEPHIN) IVPB (premix in dextrose) 2,000 mg 50 mL (2,000 mg Intravenous New Bag 2/24/25 1043)   sodium chloride 0.9 % bolus 1,000 mL (1,000 mL Intravenous New Bag 2/24/25 1046)   sodium chloride 0.9 % bolus 1,000 mL (0 mL Intravenous Stopped 2/24/25 1014)   HYDROmorphone (DILAUDID) injection 0.5 mg (0.5 mg Intravenous Given 2/24/25 0834)   HYDROmorphone (DILAUDID) injection 1 mg (1 mg Intravenous Given 2/24/25 1014)       ED Risk Strat Scores                            SBIRT 22yo+      Flowsheet Row Most Recent Value   Initial Alcohol Screen: US AUDIT-C     1. How often do you have a drink containing alcohol? 0 Filed at: 02/24/2025 0829   2. How many drinks containing alcohol do you have on a typical day you are drinking?  0 Filed at: 02/24/2025 0829   3a. Male UNDER 65: How often do you have five or more drinks on one occasion? 0 Filed at: 02/24/2025 0829   3b. FEMALE Any Age, or MALE 65+: How often do you have 4 or more drinks on one occassion? 0 Filed at: 02/24/2025 0829   Audit-C Score 0 Filed at: 02/24/2025 0829   GORDON: How many times in the past year have you...    Used an illegal drug or used a prescription medication for non-medical reasons? Never Filed at: 02/24/2025 0829                            History of Present Illness       Chief Complaint   Patient presents with     Flank Pain     Right flank pain  Onset 0600 this am       Past Medical History:   Diagnosis Date    Chronic kidney disease     CPAP (continuous positive airway pressure) dependence     History of transfusion     Hyperlipidemia     Ileus (HCC) 04/01/2022    Irregular heart beat     Kidney stone     Sleep apnea     Ulcerative colitis (HCC)       Past Surgical History:   Procedure Laterality Date    COLONOSCOPY      COLOSTOMY  2005    CYSTOSCOPY W/ LASER LITHOTRIPSY Right 6/10/2024    Procedure: CYSTOSCOPY; RETROGRADE; URETEROSCOPY; LASER LITHOTRIPSY; STONE BASKETING; STENT EXCHANGE;  Surgeon: Gualberto Mosley MD;  Location: [facility];  Service: Urology    FL RETROGRADE PYELOGRAM  04/22/2024    FL RETROGRADE PYELOGRAM  6/10/2024    ILEOSTOMY Right 2007    MULTIPLE TOOTH EXTRACTIONS      MO CYSTOURETHROSCOPY W/URETERAL CATHETERIZATION Right 04/22/2024    Procedure: CYSTOSCOPY RETROGRADE PYELOGRAM WITH INSERTION STENT URETERAL;  Surgeon: Gualberto Mosley MD;  Location: [facility];  Service: Urology      Family History   Problem Relation Age of Onset    Alcohol abuse Mother     Alzheimer's disease Father     Cancer Sister       Social History     Tobacco Use    Smoking status: Never    Smokeless tobacco: Never   Vaping Use    Vaping status: Never Used   Substance Use Topics    Alcohol use: Not Currently     Comment: social    Drug use: Never      E-Cigarette/Vaping    E-Cigarette Use Never User       E-Cigarette/Vaping Substances    Nicotine No     THC No     CBD No     Flavoring No     Other No     Unknown No       I have reviewed and agree with the history as documented.     Patient is a 54-year-old male with relevant past medical history of colostomy, CKD, CPAP dependence, hyperlipidemia, ileus, kidney stone, and UC presenting with right flank pain since 6 AM. He woke up this morning around 5:30 AM feeling anxious and started with right flank pain around 6 AM that has been gradually getting worse. It feels like his  prior kidney stones. He describes his right flank pain as raw and rates it a 9/10. He had an infection last time he had a kidney stone so this made him worried and wanting to come early. He drove himself here and denies hematuria yet. He reports his right kidney is his good kidney and functions at 80% in his left functions at 20%. He denies fevers, chills, headache, dizziness, weakness, chest pain, shortness of breath, abdominal pain, nausea, vomiting, urinary symptoms.            History provided by:  Patient   used: No    Flank Pain  Associated symptoms: no chest pain, no chills, no constipation, no cough, no diarrhea, no dysuria, no fever, no hematuria, no nausea, no shortness of breath, no sore throat and no vomiting        Review of Systems   Constitutional:  Negative for chills and fever.   HENT:  Negative for congestion, ear pain, rhinorrhea and sore throat.    Eyes:  Negative for pain and visual disturbance.   Respiratory:  Negative for cough and shortness of breath.    Cardiovascular:  Negative for chest pain and palpitations.   Gastrointestinal:  Negative for abdominal pain, constipation, diarrhea, nausea and vomiting.   Genitourinary:  Positive for flank pain. Negative for dysuria, frequency, hematuria and urgency.   Musculoskeletal:  Negative for arthralgias and back pain.   Skin:  Negative for color change and rash.   Neurological:  Negative for dizziness, seizures, syncope, light-headedness and headaches.   Psychiatric/Behavioral:  Negative for agitation and confusion.            Objective       ED Triage Vitals   Temperature Pulse Blood Pressure Respirations SpO2 Patient Position - Orthostatic VS   02/24/25 0820 02/24/25 0820 02/24/25 0820 02/24/25 0820 02/24/25 0820 02/24/25 0820   98 °F (36.7 °C) 63 (!) 183/92 16 96 % Sitting      Temp src Heart Rate Source BP Location FiO2 (%) Pain Score    -- 02/24/25 0820 02/24/25 0820 -- 02/24/25 0819     Monitor Left arm  9      Vitals       Date and Time Temp Pulse SpO2 Resp BP Pain Score FACES Pain Rating User   02/24/25 1015 -- 67 96 % 18 171/80 -- -- AMF   02/24/25 0820 98 °F (36.7 °C) 63 96 % 16 183/92 -- -- NAD   02/24/25 0819 -- -- -- -- -- 9 -- NAD            Physical Exam  Vitals and nursing note reviewed.   Constitutional:       General: He is not in acute distress.     Appearance: He is well-developed. He is obese. He is not ill-appearing.   HENT:      Head: Normocephalic and atraumatic.   Eyes:      Conjunctiva/sclera: Conjunctivae normal.   Cardiovascular:      Rate and Rhythm: Normal rate and regular rhythm.      Heart sounds: No murmur heard.  Pulmonary:      Effort: Pulmonary effort is normal. No respiratory distress.      Breath sounds: Normal breath sounds. No wheezing, rhonchi or rales.   Abdominal:      Palpations: Abdomen is soft.      Tenderness: There is no abdominal tenderness. There is no right CVA tenderness, left CVA tenderness, guarding or rebound.       Musculoskeletal:         General: No swelling.      Cervical back: Neck supple.   Skin:     General: Skin is warm and dry.      Capillary Refill: Capillary refill takes less than 2 seconds.   Neurological:      General: No focal deficit present.      Mental Status: He is alert and oriented to person, place, and time.      GCS: GCS eye subscore is 4. GCS verbal subscore is 5. GCS motor subscore is 6.      Cranial Nerves: Cranial nerves 2-12 are intact.      Sensory: Sensation is intact.      Motor: Motor function is intact.      Coordination: Coordination is intact.      Gait: Gait is intact.   Psychiatric:         Mood and Affect: Mood normal.         Results Reviewed       Procedure Component Value Units Date/Time    Protime-INR [[id number]]  (Normal) Collected: 02/24/25 0825    Lab Status: Final result Specimen: Blood from Arm, Right Updated: 02/24/25 0902     Protime 13.5 seconds      INR 0.98    Narrative:      INR Therapeutic Range    Indication                                              INR Range      Atrial Fibrillation                                               2.0-3.0  Hypercoagulable State                                    2.0.2.3  Left Ventricular Asist Device                            2.0-3.0  Mechanical Heart Valve                                  -    Aortic(with afib, MI, embolism, HF, LA enlargement,    and/or coagulopathy)                                     2.0-3.0 (2.5-3.5)     Mitral                                                             2.5-3.5  Prosthetic/Bioprosthetic Heart Valve               2.0-3.0  Venous thromboembolism (VTE: VT, PE        2.0-3.0    APTT [[id number]]  (Normal) Collected: 02/24/25 0825    Lab Status: Final result Specimen: Blood from Arm, Right Updated: 02/24/25 0902     PTT 29 seconds     CMP [[id number]]  (Abnormal) Collected: 02/24/25 0825    Lab Status: Final result Specimen: Blood from Arm, Right Updated: 02/24/25 0848     Sodium 135 mmol/L      Potassium 4.5 mmol/L      Chloride 101 mmol/L      CO2 26 mmol/L      ANION GAP 8 mmol/L      BUN 15 mg/dL      Creatinine 1.55 mg/dL      Glucose 128 mg/dL      Calcium 9.3 mg/dL      AST 19 U/L      ALT 17 U/L      Alkaline Phosphatase 62 U/L      Total Protein 8.0 g/dL      Albumin 4.4 g/dL      Total Bilirubin 0.43 mg/dL      eGFR 49 ml/min/1.73sq m     Narrative:      National Kid[employer] Disease Foundation guidelines for Chronic Kidney Disease (CKD):     Stage 1 with normal or high GFR (GFR > 90 mL/min/1.73 square meters)    Stage 2 Mild CKD (GFR = 60-89 mL/min/1.73 square meters)    Stage 3A Moderate CKD (GFR = 45-59 mL/min/1.73 square meters)    Stage 3B Moderate CKD (GFR = 30-44 mL/min/1.73 square meters)    Stage 4 Severe CKD (GFR = 15-29 mL/min/1.73 square meters)    Stage 5 End Stage CKD (GFR <15 mL/min/1.73 square meters)  Note: GFR calculation is accurate only with a steady state creatinine    Lipase [[id number]]  (Normal) Collected: 02/24/25 0825    Lab Status: Final result  Specimen: Blood from Arm, Right Updated: 02/24/25 0848     Lipase 32 u/L     Urine Microscopic [[id number]]  (Abnormal) Collected: 02/24/25 0825    Lab Status: Final result Specimen: Urine, Clean Catch Updated: 02/24/25 0840     RBC, UA 20-30 /hpf      WBC, UA 2-4 /hpf      Epithelial Cells Occasional /hpf      Bacteria, UA Occasional /hpf      MUCUS THREADS Occasional    UA w Reflex to Microscopic w Reflex to Culture [[id number]]  (Abnormal) Collected: 02/24/25 0825    Lab Status: Final result Specimen: Urine, Clean Catch Updated: 02/24/25 0835     Color, UA Yellow     Clarity, UA Clear     Specific Gravity, UA 1.025     pH, UA 5.5     Leukocytes, UA Negative     Nitrite, UA Negative     Protein, UA Negative mg/dl      Glucose, UA Negative mg/dl      Ketones, UA Negative mg/dl      Urobilinogen, UA 0.2 E.U./dl      Bilirubin, UA Negative     Occult Blood, UA 3+    CBC and differential [[id number]]  (Abnormal) Collected: 02/24/25 0825    Lab Status: Final result Specimen: Blood from Arm, Right Updated: 02/24/25 0831     WBC 8.23 Thousand/uL      RBC 5.43 Million/uL      Hemoglobin 15.6 g/dL      Hematocrit 47.9 %      MCV 88 fL      MCH 28.7 pg      MCHC 32.6 g/dL      RDW 12.6 %      MPV 9.4 fL      Platelets 306 Thousands/uL      nRBC 0 /100 WBCs      Segmented % 79 %      Immature Grans % 1 %      Lymphocytes % 12 %      Monocytes % 5 %      Eosinophils Relative 2 %      Basophils Relative 1 %      Absolute Neutrophils 6.63 Thousands/µL      Absolute Immature Grans 0.05 Thousand/uL      Absolute Lymphocytes 0.97 Thousands/µL      Absolute Monocytes 0.38 Thousand/µL      Eosinophils Absolute 0.16 Thousand/µL      Basophils Absolute 0.04 Thousands/µL             CT renal stone study abdomen pelvis without contrast   Final Interpretation by Lm Cameron MD (02/24 0905)      1.  Right ureteropelvic junction 3 mm calculus resulting in mild right hydroureteronephrosis.   2.  Marked left renal atrophy with nonobstructive  calculi as above.   3.  Stable right ventral parastomal hernia containing multiple loops of large and small bowel without evidence of obstruction.   4.  Cholelithiasis.         Workstation performed: [id number]             ECG 12 Lead Documentation Only    Date/Time: 2/24/2025 10:42 AM    Performed by: Kurt White PA-C  Authorized by: Kurt White PA-C    Indications / Diagnosis:  Preop planning.  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 Taking?   allopurinol (ZYLOPRIM) 100 mg tablet   No No   Sig: Take 1 tablet (100 mg total) by mouth daily   brompheniramine-pseudoephedrine-DM 30-2-10 MG/5ML syrup   No No   Sig: Take 5 mL by mouth 4 (four) times a day as needed for cough or congestion   ergocalciferol (Drisdol) 1.25 MG (05646 UT) capsule   No No   Sig: Take 1 capsule (50,000 Units total) by mouth once a week   potassium citrate (UROCIT-K) 10 mEq   No No   Sig: Take 1 tablet (10 mEq total) by mouth 3 (three) times a day with meals   tamsulosin (FLOMAX) 0.4 mg  Self No No   Sig: Take 1 capsule (0.4 mg total) by mouth daily with dinner   Patient not taking: Reported on 1/8/2025      Facility-Administered Medications: None     Patient's Medications   Discharge Prescriptions    No medications on file     No discharge procedures on file.  ED SEPSIS DOCUMENTATION   Time reflects when diagnosis was documented in both MDM  as applicable and the Disposition within this note       Time User Action Codes Description Comment    2/24/2025  9:12 AM Kurt White Add [N20.1] Ureteropelvic junction calculus     2/24/2025  9:12 AM Kurt White Modify [N20.1] Right ureteropelvic junction calculus     2/24/2025  9:13 AM Kurt White Add [N13.30] Hydroureteronephrosis     2/24/2025  9:13 AM Kurt White Modify [N13.30] Right hydroureteronephrosis     2/24/2025  9:13 AM Kurt White Add [N26.1] Left renal atrophy     2/24/2025  9:13 AM Kurt White Add [K80.20] Cholelithiasis     2/24/2025 10:16 AM Gualberto Mosley Add [N17.9] Acute kidney injury (nontraumatic) (HCC)     2/24/2025 10:38 AM Kurt White Add [K43.5] Parastomal hernia                  Kurt White PA-C  02/24/25 1101

## 2025-02-24 NOTE — CONSULTS
Consultation - Urology   Name: Fredrick Zamudio 54 y.o. male I MRN: [id number]  Unit/Bed#: OR POOL I Date of Admission: 2/24/2025   Date of Service: 2/24/2025 I Hospital Day: 0   Inpatient consult to Urology  Consult performed by: Gualberto Mosley MD  Consult ordered by: Long Reyna MD        Physician Requesting Evaluation: Long Reyna MD   Reason for Evaluation / Principal Problem: Obstructing right proximal ureteral stone with JENAE    Assessment & Plan  Acute kidney injury (nontraumatic) (HCC)    Hydronephrosis with urterocalculus  Assessment:  Right renal colic secondary to a 5 mm proximal right ureteral stone with mild JENAE in this patient with known urolithiasis and poorly functioning left kidney.    Plan:  Options, procedures, benefits and risks were discussed with the patient and his wife and they agree to the planned procedure which will include cystoscopy with right retrograde urogram and insertion of right ureteral stent.  They understand that the stone will not be removed at this time and will require additional procedures as an outpatient.  We will follow his renal function after stent insertion.  Ceftriaxone was given in the ED.  CPAP (continuous positive airway pressure) dependence    I have discussed the above management plan in detail with the primary service.     History of Present Illness   Fredrick Zamudio is a 54 y.o. male who presents with right flank pain starting early this morning which is typical of his previous renal colic.  He has a history of obstructing stones in the past requiring ureteroscopy and laser lithotripsy last year.  He also has an atrophic left kidney with nonobstructing stones.  Previous renal scan showed only 11% splitfunction from the left kidney.  Creatinine today is elevated at 1.55 with decreased GFR from baseline at 49.  Review of Systems   Gastrointestinal:  Positive for abdominal pain. Negative for abdominal distention.   Genitourinary:  Positive  for flank pain. Negative for difficulty urinating and hematuria.     Historical Information   Past Medical History:   Diagnosis Date    Chronic kidney disease     CPAP (continuous positive airway pressure) dependence     History of transfusion     Hyperlipidemia     Ileus (HCC) 04/01/2022    Irregular heart beat     Kidney stone     Sleep apnea     Ulcerative colitis (HCC)      Past Surgical History:   Procedure Laterality Date    COLONOSCOPY      COLOSTOMY  2005    CYSTOSCOPY W/ LASER LITHOTRIPSY Right 6/10/2024    Procedure: CYSTOSCOPY; RETROGRADE; URETEROSCOPY; LASER LITHOTRIPSY; STONE BASKETING; STENT EXCHANGE;  Surgeon: Gualberto Mosley MD;  Location: [facility];  Service: Urology    FL RETROGRADE PYELOGRAM  04/22/2024    FL RETROGRADE PYELOGRAM  6/10/2024    ILEOSTOMY Right 2007    MULTIPLE TOOTH EXTRACTIONS      MS CYSTOURETHROSCOPY W/URETERAL CATHETERIZATION Right 04/22/2024    Procedure: CYSTOSCOPY RETROGRADE PYELOGRAM WITH INSERTION STENT URETERAL;  Surgeon: Gualberto Mosley MD;  Location: [facility];  Service: Urology     Social History     Tobacco Use    Smoking status: Never    Smokeless tobacco: Never   Vaping Use    Vaping status: Never Used   Substance and Sexual Activity    Alcohol use: Yes     Comment: social    Drug use: Never    Sexual activity: Yes     Partners: Female     E-Cigarette/Vaping    E-Cigarette Use Never User      E-Cigarette/Vaping Substances    Nicotine No     THC No     CBD No     Flavoring No     Other No     Unknown No      Family history non-contributory  Social History     Tobacco Use    Smoking status: Never    Smokeless tobacco: Never   Vaping Use    Vaping status: Never Used   Substance and Sexual Activity    Alcohol use: Yes     Comment: social    Drug use: Never    Sexual activity: Yes     Partners: Female       Current Facility-Administered Medications:     lactated ringers infusion, Continuous, Last Rate: 125 mL/hr (02/24/25 1126)  Prior to Admission Medications    Prescriptions Last Dose Informant Patient Reported? Taking?   allopurinol (ZYLOPRIM) 100 mg tablet 2/23/2025 Evening  No Yes   Sig: Take 1 tablet (100 mg total) by mouth daily   brompheniramine-pseudoephedrine-DM 30-2-10 MG/5ML syrup 2/23/2025 Morning  No Yes   Sig: Take 5 mL by mouth 4 (four) times a day as needed for cough or congestion   ergocalciferol (Drisdol) 1.25 MG (32812 UT) capsule Past Week  No Yes   Sig: Take 1 capsule (50,000 Units total) by mouth once a week   potassium citrate (UROCIT-K) 10 mEq 2/23/2025 Evening  No Yes   Sig: Take 1 tablet (10 mEq total) by mouth 3 (three) times a day with meals   tamsulosin (FLOMAX) 0.4 mg  Self No No   Sig: Take 1 capsule (0.4 mg total) by mouth daily with dinner   Patient not taking: Reported on 1/8/2025      Facility-Administered Medications: None     Lactose - food allergy    Objective :  Temp:  [98 °F (36.7 °C)-98.3 °F (36.8 °C)] 98.3 °F (36.8 °C)  HR:  [63-78] 78  BP: (162-183)/(80-92) 162/82  Resp:  [16-18] 16  SpO2:  [96 %-97 %] 97 %  O2 Device: None (Room air)    I/O         02/22 0701  02/23 0700 02/23 0701  02/24 0700 02/24 0701  02/25 0700    IV Piggyback   1000    Total Intake(mL/kg)   1000 (7)    Urine (mL/kg/hr)   300    Total Output   300    Net   +700                   Physical Exam  Abdominal:      General: There is no distension.      Tenderness: There is no abdominal tenderness. There is right CVA tenderness. There is no left CVA tenderness.   Genitourinary:     Penis: Normal.       Testes: Normal.        Lab Results: I have reviewed the following results:CBC/BMP:   .     02/24/25  0825   WBC 8.23   HGB 15.6   HCT 47.9      SODIUM 135   K 4.5      CO2 26   BUN 15   CREATININE 1.55*   GLUC 128      Recent Labs     02/24/25  0825   WBC 8.23   HGB 15.6   HCT 47.9      SODIUM 135   K 4.5      CO2 26   BUN 15   CREATININE 1.55*   GLUC 128   AST 19   ALT 17   ALB 4.4   TBILI 0.43   ALKPHOS 62   PTT 29   INR 0.98     Lab  Results   Component Value Date    COLORU Yellow 02/24/2025    CLARITYU Clear 02/24/2025    SPECGRAV 1.025 02/24/2025    PHUR 5.5 02/24/2025    LEUKOCYTESUR Negative 02/24/2025    NITRITE Negative 02/24/2025    GLUCOSEU Negative 02/24/2025    KETONESU Negative 02/24/2025    BILIRUBINUR Negative 02/24/2025    BLOODU 3+ (A) 02/24/2025   ,   Lab Results   Component Value Date    URINECX No Growth <100 cfu/mL 06/10/2024       Imaging Results Review: I reviewed radiology reports from this admission including: CT abdomen/pelvis.  Other Study Results Review: No additional pertinent studies reviewed.    VTE Prophylaxis: Sequential compression device (Venodyne)     Administrative Statements

## 2025-02-25 ENCOUNTER — APPOINTMENT (OUTPATIENT)
Dept: RADIOLOGY | Facility: HOSPITAL | Age: 55
End: 2025-02-25
Payer: COMMERCIAL

## 2025-02-25 VITALS
HEIGHT: 70 IN | DIASTOLIC BLOOD PRESSURE: 85 MMHG | SYSTOLIC BLOOD PRESSURE: 136 MMHG | RESPIRATION RATE: 18 BRPM | OXYGEN SATURATION: 92 % | HEART RATE: 70 BPM | TEMPERATURE: 98.2 F | BODY MASS INDEX: 44.15 KG/M2 | WEIGHT: 308.42 LBS

## 2025-02-25 LAB
ALBUMIN SERPL BCG-MCNC: 3.8 G/DL (ref 3.5–5)
ALP SERPL-CCNC: 54 U/L (ref 34–104)
ALT SERPL W P-5'-P-CCNC: 17 U/L (ref 7–52)
ANION GAP SERPL CALCULATED.3IONS-SCNC: 6 MMOL/L (ref 4–13)
AST SERPL W P-5'-P-CCNC: 22 U/L (ref 13–39)
ATRIAL RATE: 68 BPM
BACTERIA UR CULT: NORMAL
BASOPHILS # BLD AUTO: 0.04 THOUSANDS/ÂΜL (ref 0–0.1)
BASOPHILS NFR BLD AUTO: 1 % (ref 0–1)
BILIRUB SERPL-MCNC: 0.36 MG/DL (ref 0.2–1)
BUN SERPL-MCNC: 17 MG/DL (ref 5–25)
CALCIUM SERPL-MCNC: 8.9 MG/DL (ref 8.4–10.2)
CHLORIDE SERPL-SCNC: 107 MMOL/L (ref 96–108)
CO2 SERPL-SCNC: 25 MMOL/L (ref 21–32)
CREAT SERPL-MCNC: 1.39 MG/DL (ref 0.6–1.3)
EOSINOPHIL # BLD AUTO: 0.19 THOUSAND/ÂΜL (ref 0–0.61)
EOSINOPHIL NFR BLD AUTO: 3 % (ref 0–6)
ERYTHROCYTE [DISTWIDTH] IN BLOOD BY AUTOMATED COUNT: 12.8 % (ref 11.6–15.1)
GFR SERPL CREATININE-BSD FRML MDRD: 56 ML/MIN/1.73SQ M
GLUCOSE P FAST SERPL-MCNC: 101 MG/DL (ref 65–99)
GLUCOSE SERPL-MCNC: 101 MG/DL (ref 65–140)
HCT VFR BLD AUTO: 43.1 % (ref 36.5–49.3)
HGB BLD-MCNC: 13.9 G/DL (ref 12–17)
IMM GRANULOCYTES # BLD AUTO: 0.04 THOUSAND/UL (ref 0–0.2)
IMM GRANULOCYTES NFR BLD AUTO: 1 % (ref 0–2)
LYMPHOCYTES # BLD AUTO: 1.29 THOUSANDS/ÂΜL (ref 0.6–4.47)
LYMPHOCYTES NFR BLD AUTO: 19 % (ref 14–44)
MAGNESIUM SERPL-MCNC: 2.2 MG/DL (ref 1.9–2.7)
MCH RBC QN AUTO: 28.9 PG (ref 26.8–34.3)
MCHC RBC AUTO-ENTMCNC: 32.3 G/DL (ref 31.4–37.4)
MCV RBC AUTO: 90 FL (ref 82–98)
MONOCYTES # BLD AUTO: 0.51 THOUSAND/ÂΜL (ref 0.17–1.22)
MONOCYTES NFR BLD AUTO: 8 % (ref 4–12)
NEUTROPHILS # BLD AUTO: 4.76 THOUSANDS/ÂΜL (ref 1.85–7.62)
NEUTS SEG NFR BLD AUTO: 68 % (ref 43–75)
NRBC BLD AUTO-RTO: 0 /100 WBCS
P AXIS: 10 DEGREES
PHOSPHATE SERPL-MCNC: 3.3 MG/DL (ref 2.7–4.5)
PLATELET # BLD AUTO: 293 THOUSANDS/UL (ref 149–390)
PMV BLD AUTO: 9.5 FL (ref 8.9–12.7)
POTASSIUM SERPL-SCNC: 4.9 MMOL/L (ref 3.5–5.3)
PR INTERVAL: 210 MS
PROT SERPL-MCNC: 7 G/DL (ref 6.4–8.4)
QRS AXIS: -45 DEGREES
QRSD INTERVAL: 98 MS
QT INTERVAL: 396 MS
QTC INTERVAL: 421 MS
RBC # BLD AUTO: 4.81 MILLION/UL (ref 3.88–5.62)
SODIUM SERPL-SCNC: 138 MMOL/L (ref 135–147)
T WAVE AXIS: -11 DEGREES
VENTRICULAR RATE: 68 BPM
WBC # BLD AUTO: 6.83 THOUSAND/UL (ref 4.31–10.16)

## 2025-02-25 PROCEDURE — 80053 COMPREHEN METABOLIC PANEL: CPT | Performed by: HOSPITALIST

## 2025-02-25 PROCEDURE — 99239 HOSP IP/OBS DSCHRG MGMT >30: CPT | Performed by: HOSPITALIST

## 2025-02-25 PROCEDURE — 84100 ASSAY OF PHOSPHORUS: CPT | Performed by: HOSPITALIST

## 2025-02-25 PROCEDURE — 83735 ASSAY OF MAGNESIUM: CPT | Performed by: HOSPITALIST

## 2025-02-25 PROCEDURE — 74018 RADEX ABDOMEN 1 VIEW: CPT

## 2025-02-25 PROCEDURE — 93010 ELECTROCARDIOGRAM REPORT: CPT | Performed by: INTERNAL MEDICINE

## 2025-02-25 PROCEDURE — 85025 COMPLETE CBC W/AUTO DIFF WBC: CPT | Performed by: HOSPITALIST

## 2025-02-25 RX ADMIN — ALLOPURINOL 100 MG: 100 TABLET ORAL at 09:23

## 2025-02-25 RX ADMIN — HEPARIN SODIUM 5000 UNITS: 5000 INJECTION INTRAVENOUS; SUBCUTANEOUS at 05:34

## 2025-02-25 RX ADMIN — SODIUM CHLORIDE, SODIUM GLUCONATE, SODIUM ACETATE, POTASSIUM CHLORIDE, MAGNESIUM CHLORIDE, SODIUM PHOSPHATE, DIBASIC, AND POTASSIUM PHOSPHATE 125 ML/HR: .53; .5; .37; .037; .03; .012; .00082 INJECTION, SOLUTION INTRAVENOUS at 01:39

## 2025-02-25 NOTE — PLAN OF CARE
Problem: Potential for Falls  Goal: Patient will remain free of falls  Description: INTERVENTIONS:  - Educate patient/family on patient safety including physical limitations  - Instruct patient to call for assistance with activity   - Consult OT/PT to assist with strengthening/mobility   - Keep Call bell within reach  - Keep bed low and locked with side rails adjusted as appropriate  - Keep care items and personal belongings within reach  - Initiate and maintain comfort rounds  - Make Fall Risk Sign visible to staff  - Offer Toileting every 2 Hours, in advance of need  - Initiate/Maintain bed alarm  - Obtain necessary fall risk management equipment: yellow socks  - Apply yellow socks and bracelet for high fall risk patients  - Consider moving patient to room near nurses station  Outcome: Progressing     Problem: PAIN - ADULT  Goal: Verbalizes/displays adequate comfort level or baseline comfort level  Description: Interventions:  - Encourage patient to monitor pain and request assistance  - Assess pain using appropriate pain scale  - Administer analgesics based on type and severity of pain and evaluate response  - Implement non-pharmacological measures as appropriate and evaluate response  - Consider cultural and social influences on pain and pain management  - Notify physician/advanced practitioner if interventions unsuccessful or patient reports new pain  Outcome: Progressing     Problem: INFECTION - ADULT  Goal: Absence or prevention of progression during hospitalization  Description: INTERVENTIONS:  - Assess and monitor for signs and symptoms of infection  - Monitor lab/diagnostic results  - Monitor all insertion sites, i.e. indwelling lines, tubes, and drains  - Monitor endotracheal if appropriate and nasal secretions for changes in amount and color  - [place] appropriate cooling/warming therapies per order  - Administer medications as ordered  - Instruct and encourage patient and family to use good hand  hygiene technique  - Identify and instruct in appropriate isolation precautions for identified infection/condition  Outcome: Progressing  Goal: Absence of fever/infection during neutropenic period  Description: INTERVENTIONS:  - Monitor WBC    Outcome: Progressing     Problem: GASTROINTESTINAL - ADULT  Goal: Establish and maintain optimal ostomy function  Description: INTERVENTIONS:  - Assess bowel function  - Encourage oral fluids to ensure adequate hydration  - Administer IV fluids if ordered to ensure adequate hydration   - Administer ordered medications as needed  - Encourage mobilization and activity  - Nutrition services referral to assist patient with appropriate food choices  - Assess stoma site  - Consider wound care consult   Outcome: Progressing     Problem: GENITOURINARY - ADULT  Goal: Maintains or returns to baseline urinary function  Description: INTERVENTIONS:  - Assess urinary function  - Encourage oral fluids to ensure adequate hydration if ordered  - Administer IV fluids as ordered to ensure adequate hydration  - Administer ordered medications as needed  - Offer frequent toileting  - Follow urinary retention protocol if ordered  Outcome: Progressing  Goal: Absence of urinary retention  Description: INTERVENTIONS:  - Assess patient’s ability to void and empty bladder  - Monitor I/O  - Bladder scan as needed  - Discuss with physician/AP medications to alleviate retention as needed  - Discuss catheterization for long term situations as appropriate  Outcome: Progressing     Problem: METABOLIC, FLUID AND ELECTROLYTES - ADULT  Goal: Electrolytes maintained within normal limits  Description: INTERVENTIONS:  - Monitor labs and assess patient for signs and symptoms of electrolyte imbalances  - Administer electrolyte replacement as ordered  - Monitor response to electrolyte replacements, including repeat lab results as appropriate  - Instruct patient on fluid and nutrition as appropriate  Outcome:  Progressing  Goal: Fluid balance maintained  Description: INTERVENTIONS:  - Monitor labs   - Monitor I/O and WT  - Instruct patient on fluid and nutrition as appropriate  - Assess for signs & symptoms of volume excess or deficit  Outcome: Progressing     Problem: HEMATOLOGIC - ADULT  Goal: Maintains hematologic stability  Description: INTERVENTIONS  - Assess for signs and symptoms of bleeding or hemorrhage  - Monitor labs  - Administer supportive blood products/factors as ordered and appropriate  Outcome: Progressing

## 2025-02-25 NOTE — ASSESSMENT & PLAN NOTE
Continue home dosing of allopurinol in the post discharge setting as the patient was taking prior to arrival

## 2025-02-25 NOTE — PLAN OF CARE
Problem: INFECTION - ADULT  Goal: Absence or prevention of progression during hospitalization  Description: INTERVENTIONS:  - Assess and monitor for signs and symptoms of infection  - Monitor lab/diagnostic results  - Monitor all insertion sites, i.e. indwelling lines, tubes, and drains  - Monitor endotracheal if appropriate and nasal secretions for changes in amount and color  - [place] appropriate cooling/warming therapies per order  - Administer medications as ordered  - Instruct and encourage patient and family to use good hand hygiene technique  - Identify and instruct in appropriate isolation precautions for identified infection/condition  Outcome: Progressing    Problem: GASTROINTESTINAL - ADULT  Goal: Establish and maintain optimal ostomy function  Description: INTERVENTIONS:  - Assess bowel function  - Encourage oral fluids to ensure adequate hydration  - Administer IV fluids if ordered to ensure adequate hydration   - Administer ordered medications as needed  - Encourage mobilization and activity  - Nutrition services referral to assist patient with appropriate food choices  - Assess stoma site  - Consider wound care consult   Outcome: Progressing    Problem: GENITOURINARY - ADULT  Goal: Maintains or returns to baseline urinary function  Description: INTERVENTIONS:  - Assess urinary function  - Encourage oral fluids to ensure adequate hydration if ordered  - Administer IV fluids as ordered to ensure adequate hydration  - Administer ordered medications as needed  - Offer frequent toileting  - Follow urinary retention protocol if ordered  Outcome: Progressing  Goal: Absence of urinary retention  Description: INTERVENTIONS:  - Assess patient’s ability to void and empty bladder  - Monitor I/O  - Bladder scan as needed  - Discuss with physician/AP medications to alleviate retention as needed  - Discuss catheterization for long term situations as appropriate  Outcome: Progressing    Problem: METABOLIC, FLUID  AND ELECTROLYTES - ADULT  Goal: Electrolytes maintained within normal limits  Description: INTERVENTIONS:  - Monitor labs and assess patient for signs and symptoms of electrolyte imbalances  - Administer electrolyte replacement as ordered  - Monitor response to electrolyte replacements, including repeat lab results as appropriate  - Instruct patient on fluid and nutrition as appropriate  Outcome: Progressing  Goal: Fluid balance maintained  Description: INTERVENTIONS:  - Monitor labs   - Monitor I/O and WT  - Instruct patient on fluid and nutrition as appropriate  - Assess for signs & symptoms of volume excess or deficit  Outcome: Progressing    Problem: HEMATOLOGIC - ADULT  Goal: Maintains hematologic stability  Description: INTERVENTIONS  - Assess for signs and symptoms of bleeding or hemorrhage  - Monitor labs  - Administer supportive blood products/factors as ordered and appropriate  Outcome: Progressing

## 2025-02-25 NOTE — CASE MANAGEMENT
Case Management Assessment & Discharge Planning Note    Patient name Fredrick Zamudio  Location /-01 MRN [id number]  : 1970 Date 2025       Current Admission Date: 2025  Current Admission Diagnosis:Acute kidney injury (nontraumatic) (HCC)   Patient Active Problem List    Diagnosis Date Noted Date Diagnosed    Ulcerative colitis (HCC) 2025     Chronic gout of multiple sites 2025     CPAP (continuous positive airway pressure) dependence      Hydronephrosis with urterocalculus 2024     Elevated fasting blood sugar 2023     Vitamin D deficiency 2023     Blood pressure elevated without history of HTN 2023     Stage 3a chronic kidney disease (HCC) 2022     Acute kidney injury (nontraumatic) (HCC) 2022     Intra-abdominal infection 2022     Morbid obesity with BMI of 40.0-44.9, adult (HCC) 2022       LOS (days): 0  Geometric Mean LOS (GMLOS) (days):   Days to GMLOS:     OBJECTIVE:              Current admission status: Observation       Preferred Pharmacy:   [id number]  [address] [place] [id number]  Phone: [phone] Fax: [phone]    Primary Care Provider: Ray Soto DO    Primary Insurance: [employer]  Secondary Insurance:     ASSESSMENT:  Active Health Care Proxies       Shawna Zamudio Health Care Agent - Spouse   Primary Phone: [phone] (Mobile)                    Advance Directives  Does patient have a Health Care POA?: No  Was patient offered paperwork?: Yes  Does patient currently have a Health Care decision maker?: No  Does patient have Advance Directives?: No  Was patient offered paperwork?: Yes  Primary Contact: Shawna Zamudio - spouse        Readmission [facility]  30 Day Readmission: No     Patient Information  Admitted from:: Home  Mental Status: Alert  During Assessment patient was accompanied by: Not accompanied during  assessment  Assessment information provided by:: Patient  Primary Caregiver: Self  Support Systems: Spouse/significant other  County of Residence: [place]  What city do you live in?: [place]  Home entry access options. Select all that apply.: Stairs  Number of steps to enter home.: 4  Do the steps have railings?: Yes  Type of Current Residence: [address] home  Upon entering residence, is there a bedroom on the main floor (no further steps)?: No  A bedroom is located on the following floor levels of residence (select all that apply):: 2nd Floor  Upon entering residence, is there a bathroom on the main floor (no further steps)?: No  Indicate which floors of current residence have a bathroom (select all the apply):: 2nd Floor  Number of steps to 2nd floor from main floor: One Flight  Living Arrangements: Lives w/ Spouse/significant other  Is patient a ?: No     Activities of Daily Living Prior to Admission  Functional Status: Independent  Completes ADLs independently?: Yes  Ambulates independently?: Yes  Does patient use assisted devices?: No  Does patient currently own DME?: No  Does patient have a history of Outpatient Therapy (PT/OT)?: No  Does the patient have a history of Short-Term Rehab?: No  Does patient have a history of HHC?: No  Does patient currently have HHC?: No        Patient Information Continued  Income Source: Employed  Does patient have prescription coverage?: Yes  Does patient receive dialysis treatments?: No  Does patient have a history of substance abuse?: No  Does patient have a history of Mental Health Diagnosis?: No        Means of Transportation  Means of Transport to Appts:: Drives Self       DISCHARGE DETAILS:     Discharge planning discussed with:: Patient  Freedom of Choice: Yes        Requested Home Health Care         Is the patient interested in HHC at discharge?: No     DME Referral Provided  Referral made for DME?: No        Would you like to participate in our [facility]  service program?  : No - Declined     Treatment Team Recommendation: Home  Discharge Destination Plan:: Home  Transport at Discharge : Family  Additional Comments: Pt has no identified CM discharge needs at this time. Pt will return home with family when stable. CM to follow as needed.

## 2025-02-25 NOTE — ANESTHESIA POSTPROCEDURE EVALUATION
Post-Op Assessment Note    CV Status:  Stable    Pain management: adequate       Mental Status:  Alert and awake   Hydration Status:  Euvolemic   PONV Controlled:  Controlled   Airway Patency:  Patent     Post Op Vitals Reviewed: Yes    No anethesia notable event occurred.    Staff: Anesthesiologist           Last Filed PACU Vitals:  Vitals Value Taken Time   Temp 98 °F (36.7 °C) 02/24/25 1308   Pulse 81 02/24/25 1351   /94 02/24/25 1345   Resp 16 02/24/25 1345   SpO2 95 % 02/24/25 1351   Vitals shown include unfiled device data.    Modified Barb:     Vitals Value Taken Time   Activity 2 02/24/25 1400   Respiration 2 02/24/25 1400   Circulation 2 02/24/25 1400   Consciousness 2 02/24/25 1400   Oxygen Saturation 2 02/24/25 1400     Modified Barb Score: 10

## 2025-02-25 NOTE — ASSESSMENT & PLAN NOTE
Status post a colectomy many years ago  On no meds  Colostomy in place  Outpatient follow-up with PCP

## 2025-02-25 NOTE — NURSING NOTE
Discharge instructions reviewed with Pt via virtual RN. Pt denied questions/concerns on follow-up. IV removed, cannula intact. Pt discharged from unit.

## 2025-02-25 NOTE — ASSESSMENT & PLAN NOTE
Continue CPAP at at bedtime in the post discharge setting as the patient was utilizing prior to arrival  Patient also has probable underlying morbid obesity hypoventilation syndrome

## 2025-02-25 NOTE — DISCHARGE INSTR - AVS FIRST PAGE
Dear Fredrick Zamudio,     It was our pleasure to care for you here at [facility].  It is our hope that we were always able to exceed the expected standards for your care during your stay.  You were hospitalized due to right-sided kidney stone.  You were cared for on the medical/surgical floor by Long Reyna MD with the [facility] who covers for your primary care physician (PCP), Ray Soto DO, while you were hospitalized.  You were additionally seen by Dr. Gualberto Mosley of urology.  If you have any questions or concerns related to this hospitalization, you may contact us at .  For follow up as well as any medication refills, we recommend that you follow up with your primary care physician.  A registered nurse will reach out to you by phone within a few days after your discharge to answer any additional questions that you may have after going home.  However, at this time we provide for you here, the most important instructions / recommendations at discharge:     Notable Medication Adjustments -   No new medications  Okay to continue all preadmission medications at the preadmission doses without change  Testing Required after Discharge -   To be further determined in the near future in the outpatient setting by your primary care provider  Important follow up information -   Please follow-up with the providers as outlined in this discharge package  Other Instructions -   Please keep yourself well-hydrated  Please maintain a healthy diet  Please review this entire after visit summary as additional general instructions including medication list, appointments, activity, diet, any pertinent wound care, and other additional recommendations from your care team that may be provided for you.      Sincerely,     Long Reyna MD

## 2025-02-25 NOTE — ASSESSMENT & PLAN NOTE
Has resolved   Arrival creatinine 1.55, follow-up creatinine today is 1.39   Secondary to postobstructive etiology  Initial CT abdomen pelvis with the following results:- Right ureteropelvic junction 3 mm calculus resulting in mild right hydroureteronephrosis.   Status post urology evaluation  UA results reviewed-no evidence of UTI  Patient is currently postprocedure day 1-a right cystoscopy and stent placement  Cleared by urology for discharge  Additionally, the patient is also medically stable for discharge  DC home today  Outpatient follow-up with PCP and urology within 7 to 10 days  Okay to continue all other preadmission medications at the preadmission doses, no new medications post discharge

## 2025-02-25 NOTE — DISCHARGE SUMMARY
Discharge Summary - Hospitalist   Name: Fredrick Zamudio 54 y.o. male I MRN: [id number]  Unit/Bed#: -01 I Date of Admission: 2/24/2025   Date of Service: 2/25/2025 I Hospital Day: 0     Assessment & Plan  Acute kidney injury (nontraumatic) (HCC)  Has resolved   Arrival creatinine 1.55, follow-up creatinine today is 1.39   Secondary to postobstructive etiology  Initial CT abdomen pelvis with the following results:- Right ureteropelvic junction 3 mm calculus resulting in mild right hydroureteronephrosis.   Status post urology evaluation  UA results reviewed-no evidence of UTI  Patient is currently postprocedure day 1-a right cystoscopy and stent placement  Cleared by urology for discharge  Additionally, the patient is also medically stable for discharge  DC home today  Outpatient follow-up with PCP and urology within 7 to 10 days  Okay to continue all other preadmission medications at the preadmission doses, no new medications post discharge  Hydronephrosis with urterocalculus  Management as outlined above  Continue Flomax in the post discharge setting  CPAP (continuous positive airway pressure) dependence  Continue CPAP at at bedtime in the post discharge setting as the patient was utilizing prior to arrival  Patient also has probable underlying morbid obesity hypoventilation syndrome  Morbid obesity with BMI of 40.0-44.9, adult (HCC)  Actual BMI of 44.25  Dietary, weight loss, and lifestyle modification counseling was provided  Ulcerative colitis (HCC)  Status post a colectomy many years ago  On no meds  Colostomy in place  Outpatient follow-up with PCP  Chronic gout of multiple sites  Continue home dosing of allopurinol in the post discharge setting as the patient was taking prior to arrival     Medical Problems       Resolved Problems  Date Reviewed: 7/2/2024   None       Discharging Physician / Practitioner: Long Ryena MD  PCP: Ray Soto DO  Admission Date:   Admission Orders (From  admission, onward)       Ordered        02/24/25 1042  Place in Observation  Once                          Discharge Date: 02/25/25    Consultations During Hospital Stay:  Urology    Procedures Performed:   2/24/2025-status post cystoscopy and right sided ureteral stent placement by urology    Significant Findings / Test Results:   CT renal stone study abdomen pelvis without contrast-1.  Right ureteropelvic junction 3 mm calculus resulting in mild right hydroureteronephrosis.   2.  Marked left renal atrophy with nonobstructive calculi as above.   3.  Stable right ventral parastomal hernia containing multiple loops of large and small bowel without evidence of obstruction.   4.  Cholelithiasis.     Incidental Findings:   None    Test Results Pending at Discharge (will require follow up):   None     Outpatient Tests Requested:  None    Complications: None    Reason for Admission: Acute kidney injury, obstructive uropathy, right-sided nephroureterolithiasis    Hospital Course:   Fredrick Zamudio is a 54 y.o. male patient who originally presented to the hospital on 2/24/2025 due to right flank pain.  Please refer to my initial history and physical examination for the initial presenting features and complaints.  In brief, the patient is a 54-year-old male, who came into the hospital because of right flank pain.  In the emergency room, a CAT scan of the abdomen and pelvis was performed with results as outlined above.  Patient was diagnosed with right-sided nephroureterolithiasis.  A urology consultation was obtained.  Patient was also diagnosed with an acute kidney injury at time of arrival.  Patient went to the OR on the afternoon of 2/24/2025 and successfully had a cystoscopy and right sided stent placement.  He was treated with IV fluid also.  He tolerated the procedure well.  On the morning of 2/25/2025, renal function was back to baseline for this patient, and he was deemed medically stable for discharge.  He will be  "following up in the near future in the outpatient setting with his PCP, and urology.  He was released on all of his other preadmission medications at the preadmission doses.  He was pain-free at the time of discharge.  Please refer to the assessment/plan portion of this discharge summary as outlined above for additional details regarding his stay.    alee see above list of diagnoses and related plan for additional information.     Condition at Discharge: good    Discharge Day Visit / Exam:   Subjective: Patient seen, looks and feels well, is ready to go home  Vitals: Blood Pressure: 136/85 (02/25/25 0705)  Pulse: 70 (02/25/25 0705)  Temperature: 98.2 °F (36.8 °C) (02/25/25 0705)  Temp Source: Oral (02/25/25 0705)  Respirations: 18 (02/25/25 0705)  Height: 5' 10\" (177.8 cm) (02/24/25 1420)  Weight - Scale: (!) 140 kg (308 lb 6.8 oz) (02/24/25 1420)  SpO2: 92 % (02/25/25 0705)  Physical Exam  Vitals and nursing note reviewed.   Constitutional:       General: He is not in acute distress.     Appearance: Normal appearance. He is not ill-appearing.   HENT:      Head: Normocephalic and atraumatic.      Nose: Nose normal.   Eyes:      Extraocular Movements: Extraocular movements intact.      Pupils: Pupils are equal, round, and reactive to light.   Cardiovascular:      Rate and Rhythm: Normal rate and regular rhythm.      Pulses: Normal pulses.      Heart sounds: Normal heart sounds. No murmur heard.     No friction rub. No gallop.   Pulmonary:      Effort: Pulmonary effort is normal.      Breath sounds: Normal breath sounds.   Abdominal:      General: There is no distension.      Palpations: Abdomen is soft. There is no mass.      Tenderness: There is no abdominal tenderness. There is no guarding or rebound.      Comments: Colostomy in place   Musculoskeletal:         General: No swelling or tenderness. Normal range of motion.      Cervical back: Normal range of motion and neck supple. No rigidity. No muscular " tenderness.      Right lower leg: No edema.      Left lower leg: No edema.   Skin:     General: Skin is warm.      Capillary Refill: Capillary refill takes less than 2 seconds.      Findings: No erythema or rash.   Neurological:      General: No focal deficit present.      Mental Status: He is alert and oriented to person, place, and time. Mental status is at baseline.   Psychiatric:         Mood and Affect: Mood normal.         Behavior: Behavior normal.          Discussion with Family: Patient declined call to .     Discharge instructions/Information to patient and family:   See after visit summary for information provided to patient and family.      Provisions for Follow-Up Care:  See after visit summary for information related to follow-up care and any pertinent home health orders.      Mobility at time of Discharge:   Basic Mobility Inpatient Raw Score: 24  JH-HLM Goal: 8: Walk 250 feet or more  JH-HLM Achieved: 7: Walk 25 feet or more  HLM Goal achieved. Continue to encourage appropriate mobility.     Disposition:   Home    Planned Readmission: None    Discharge Medications:  See after visit summary for reconciled discharge medications provided to patient and/or family.      Administrative Statements   Discharge Statement:  I have spent a total time of 35 minutes in caring for this patient on the day of the visit/encounter. >30 minutes of time was spent on: Diagnostic results, Prognosis, Risks and benefits of tx options, Instructions for management, Patient and family education, Importance of tx compliance, Risk factor reductions, Impressions, Counseling / Coordination of care, Documenting in the medical record, Reviewing / ordering tests, medicine, procedures  , and Communicating with other healthcare professionals   .    **Please Note: This note may have been constructed using a voice recognition system**

## 2025-04-12 ENCOUNTER — HOSPITAL ENCOUNTER (OUTPATIENT)
Dept: CT IMAGING | Facility: HOSPITAL | Age: 55
Discharge: HOME | End: 2025-04-12
Attending: UROLOGY
Payer: COMMERCIAL

## 2025-04-12 DIAGNOSIS — N20.1 CALCULUS OF URETER: ICD-10-CM

## 2025-04-12 PROCEDURE — 74176 CT ABD & PELVIS W/O CONTRAST: CPT

## 2025-05-24 ENCOUNTER — HOSPITAL ENCOUNTER (OUTPATIENT)
Dept: ULTRASOUND IMAGING | Facility: HOSPITAL | Age: 55
Discharge: HOME | End: 2025-05-24
Attending: UROLOGY
Payer: COMMERCIAL

## 2025-05-24 DIAGNOSIS — N20.0 CALCULUS OF KIDNEY: ICD-10-CM

## 2025-05-24 PROCEDURE — 76775 US EXAM ABDO BACK WALL LIM: CPT

## 2025-06-18 DIAGNOSIS — N20.0 NEPHROLITHIASIS: ICD-10-CM

## 2025-06-18 DIAGNOSIS — E79.0 HYPERURICEMIA: ICD-10-CM

## 2025-06-18 RX ORDER — ALLOPURINOL 100 MG/1
100 TABLET ORAL DAILY
Qty: 30 TABLET | Refills: 5 | Status: SHIPPED | OUTPATIENT
Start: 2025-06-18

## 2025-07-21 ENCOUNTER — OFFICE VISIT (OUTPATIENT)
Age: 55
End: 2025-07-21
Payer: COMMERCIAL

## 2025-07-21 VITALS
HEART RATE: 76 BPM | SYSTOLIC BLOOD PRESSURE: 138 MMHG | BODY MASS INDEX: 46.46 KG/M2 | DIASTOLIC BLOOD PRESSURE: 72 MMHG | OXYGEN SATURATION: 99 % | TEMPERATURE: 98.8 F | WEIGHT: 315 LBS

## 2025-07-21 DIAGNOSIS — N20.0 NEPHROLITHIASIS: Primary | ICD-10-CM

## 2025-07-21 DIAGNOSIS — E21.3 HYPERPARATHYROIDISM (HCC): ICD-10-CM

## 2025-07-21 DIAGNOSIS — N18.31 STAGE 3A CHRONIC KIDNEY DISEASE (HCC): ICD-10-CM

## 2025-07-21 DIAGNOSIS — E55.9 VITAMIN D DEFICIENCY: ICD-10-CM

## 2025-07-21 DIAGNOSIS — E79.0 HYPERURICEMIA: ICD-10-CM

## 2025-07-21 PROCEDURE — 99214 OFFICE O/P EST MOD 30 MIN: CPT | Performed by: INTERNAL MEDICINE

## 2025-07-21 RX ORDER — ERGOCALCIFEROL 1.25 MG/1
50000 CAPSULE ORAL WEEKLY
Qty: 12 CAPSULE | Refills: 1 | Status: SHIPPED | OUTPATIENT
Start: 2025-07-21

## 2025-07-21 RX ORDER — POTASSIUM CITRATE 1080 MG/1
10 TABLET, EXTENDED RELEASE ORAL
Qty: 270 TABLET | Refills: 3 | Status: SHIPPED | OUTPATIENT
Start: 2025-07-21

## 2025-07-21 RX ORDER — ALLOPURINOL 100 MG/1
200 TABLET ORAL DAILY
Qty: 180 TABLET | Refills: 1 | Status: SHIPPED | OUTPATIENT
Start: 2025-07-21

## 2025-07-22 ENCOUNTER — TELEPHONE (OUTPATIENT)
Age: 55
End: 2025-07-22

## (undated) DEVICE — POV-IOD SOLUTION 4OZ BT

## (undated) DEVICE — CATH URETERAL 5FR X 70 CM FLEX TIP POLYUR BARD

## (undated) DEVICE — SPECIMEN CONTAINER STERILE PEEL PACK

## (undated) DEVICE — GUIDEWIRE STRGHT TIP 0.035 IN  SOLO PLUS

## (undated) DEVICE — LUBRICANT JELLY SURGILUBE TUBE 2OZ FLIP TOP

## (undated) DEVICE — SYRINGE 20ML LL

## (undated) DEVICE — C-ARM: Brand: UNBRANDED

## (undated) DEVICE — 4-PORT MANIFOLD: Brand: NEPTUNE 2

## (undated) DEVICE — CATH URETERAL OPEN END 6FR X 70CM

## (undated) DEVICE — PREMIUM DRY TRAY LF: Brand: MEDLINE INDUSTRIES, INC.

## (undated) DEVICE — MEDI-VAC NON-CONDUCTIVE SUCTION TUBING 6MM X 1.8M (6FT.) L: Brand: CARDINAL HEALTH

## (undated) DEVICE — PACK CUSTOM GU CYSTO PACK RF

## (undated) DEVICE — TOWEL SURG XR DETECT GREEN STRL RFD

## (undated) DEVICE — GLOVE SRG BIOGEL 7.5

## (undated) DEVICE — BAG URINE DRAINAGE 2000ML ANTI RFLX LF

## (undated) DEVICE — FIBER HOLMIUM 272 EXCALIBUR

## (undated) DEVICE — GUIDEWIRE STRGHT TIP 0.038 IN SOLO PLUS

## (undated) DEVICE — FIRST STEP BEDSIDE KIT - STAND-UP POUCH, ENDOSCOPIC CLEANING PAD - 1 POUCH: Brand: FIRST STEP BEDSIDE KIT - STAND-UP POUCH, ENDOSCOPIC CLEANING PAD

## (undated) DEVICE — DISPOSABLE OR TOWEL: Brand: CARDINAL HEALTH

## (undated) DEVICE — BAG DRAINAGE UROCATCHER 4 SKYTRON

## (undated) DEVICE — DRAPE C-ARM X-RAY

## (undated) DEVICE — LEGGINGS: Brand: CONVERTORS

## (undated) DEVICE — SHEATH URETERAL ACCESS 12/14FR 35CM PROXIS

## (undated) DEVICE — SINGLE-USE DIGITAL FLEXIBLE URETEROSCOPE: Brand: APTRA

## (undated) DEVICE — BSKT SKYLITE STONE RETRV 3FR 120CM 4WIRE TIPLESS